# Patient Record
Sex: MALE | Race: WHITE | Employment: OTHER | URBAN - METROPOLITAN AREA
[De-identification: names, ages, dates, MRNs, and addresses within clinical notes are randomized per-mention and may not be internally consistent; named-entity substitution may affect disease eponyms.]

---

## 2017-10-31 ENCOUNTER — APPOINTMENT (OUTPATIENT)
Dept: GENERAL RADIOLOGY | Age: 82
DRG: 699 | End: 2017-10-31
Attending: EMERGENCY MEDICINE
Payer: MEDICARE

## 2017-10-31 ENCOUNTER — APPOINTMENT (OUTPATIENT)
Dept: CT IMAGING | Age: 82
DRG: 699 | End: 2017-10-31
Attending: INTERNAL MEDICINE
Payer: MEDICARE

## 2017-10-31 ENCOUNTER — HOSPITAL ENCOUNTER (INPATIENT)
Age: 82
LOS: 8 days | Discharge: NURSING, REHAB OR LONG-TERM CARE | DRG: 699 | End: 2017-11-08
Attending: EMERGENCY MEDICINE | Admitting: INTERNAL MEDICINE
Payer: MEDICARE

## 2017-10-31 DIAGNOSIS — D69.6 THROMBOCYTOPENIA (HCC): ICD-10-CM

## 2017-10-31 DIAGNOSIS — R91.1 PULMONARY NODULE: ICD-10-CM

## 2017-10-31 DIAGNOSIS — J98.59 MEDIASTINAL MASS: ICD-10-CM

## 2017-10-31 DIAGNOSIS — I48.0 PAROXYSMAL ATRIAL FIBRILLATION (HCC): ICD-10-CM

## 2017-10-31 DIAGNOSIS — R31.0 GROSS HEMATURIA: Primary | ICD-10-CM

## 2017-10-31 DIAGNOSIS — E87.1 HYPONATREMIA: ICD-10-CM

## 2017-10-31 DIAGNOSIS — C61 PROSTATE CANCER (HCC): ICD-10-CM

## 2017-10-31 DIAGNOSIS — D69.9 BLEEDING DISORDER (HCC): ICD-10-CM

## 2017-10-31 PROBLEM — J44.1 COPD EXACERBATION (HCC): Status: ACTIVE | Noted: 2017-10-31

## 2017-10-31 LAB
ALBUMIN SERPL-MCNC: 3.6 G/DL (ref 3.5–5)
ALBUMIN/GLOB SERPL: 1.4 {RATIO} (ref 1.1–2.2)
ALP SERPL-CCNC: 70 U/L (ref 45–117)
ALT SERPL-CCNC: 23 U/L (ref 12–78)
ANION GAP SERPL CALC-SCNC: 11 MMOL/L (ref 5–15)
APTT PPP: 29.5 SEC (ref 22.1–32.5)
AST SERPL-CCNC: 37 U/L (ref 15–37)
BASOPHILS # BLD: 0 K/UL (ref 0–0.1)
BASOPHILS NFR BLD: 0 % (ref 0–1)
BILIRUB SERPL-MCNC: 1 MG/DL (ref 0.2–1)
BNP SERPL-MCNC: 741 PG/ML (ref 0–450)
BUN SERPL-MCNC: 22 MG/DL (ref 6–20)
BUN/CREAT SERPL: 19 (ref 12–20)
CALCIUM SERPL-MCNC: 9.2 MG/DL (ref 8.5–10.1)
CHLORIDE SERPL-SCNC: 86 MMOL/L (ref 97–108)
CK MB CFR SERPL CALC: 2.7 % (ref 0–2.5)
CK MB SERPL-MCNC: 7.7 NG/ML (ref 5–25)
CK SERPL-CCNC: 285 U/L (ref 39–308)
CO2 SERPL-SCNC: 28 MMOL/L (ref 21–32)
CREAT SERPL-MCNC: 1.15 MG/DL (ref 0.7–1.3)
D DIMER PPP FEU-MCNC: 1.2 MG/L FEU (ref 0–0.65)
DIFFERENTIAL METHOD BLD: ABNORMAL
EOSINOPHIL # BLD: 0 K/UL (ref 0–0.4)
EOSINOPHIL NFR BLD: 0 % (ref 0–7)
ERYTHROCYTE [DISTWIDTH] IN BLOOD BY AUTOMATED COUNT: 13.4 % (ref 11.5–14.5)
ERYTHROCYTE [DISTWIDTH] IN BLOOD BY AUTOMATED COUNT: 13.5 % (ref 11.5–14.5)
FIBRINOGEN PPP-MCNC: 390 MG/DL (ref 200–475)
GLOBULIN SER CALC-MCNC: 2.5 G/DL (ref 2–4)
GLUCOSE SERPL-MCNC: 111 MG/DL (ref 65–100)
HCT VFR BLD AUTO: 28.7 % (ref 36.6–50.3)
HCT VFR BLD AUTO: 30.3 % (ref 36.6–50.3)
HGB BLD-MCNC: 10.5 G/DL (ref 12.1–17)
HGB BLD-MCNC: 9.8 G/DL (ref 12.1–17)
INR PPP: 1 (ref 0.9–1.1)
LACTATE SERPL-SCNC: 1.7 MMOL/L (ref 0.4–2)
LYMPHOCYTES # BLD: 0.8 K/UL (ref 0.8–3.5)
LYMPHOCYTES NFR BLD: 5 % (ref 12–49)
MAGNESIUM SERPL-MCNC: 1.7 MG/DL (ref 1.6–2.4)
MCH RBC QN AUTO: 29.3 PG (ref 26–34)
MCH RBC QN AUTO: 29.8 PG (ref 26–34)
MCHC RBC AUTO-ENTMCNC: 34.1 G/DL (ref 30–36.5)
MCHC RBC AUTO-ENTMCNC: 34.7 G/DL (ref 30–36.5)
MCV RBC AUTO: 84.6 FL (ref 80–99)
MCV RBC AUTO: 87.2 FL (ref 80–99)
MONOCYTES # BLD: 0.6 K/UL (ref 0–1)
MONOCYTES NFR BLD: 4 % (ref 5–13)
NEUTS SEG # BLD: 13.6 K/UL (ref 1.8–8)
NEUTS SEG NFR BLD: 91 % (ref 32–75)
PLATELET # BLD AUTO: 70 K/UL (ref 150–400)
PLATELET # BLD AUTO: 74 K/UL (ref 150–400)
POTASSIUM SERPL-SCNC: 4.6 MMOL/L (ref 3.5–5.1)
PROT SERPL-MCNC: 6.1 G/DL (ref 6.4–8.2)
PROTHROMBIN TIME: 10.5 SEC (ref 9–11.1)
RBC # BLD AUTO: 3.29 M/UL (ref 4.1–5.7)
RBC # BLD AUTO: 3.58 M/UL (ref 4.1–5.7)
RBC MORPH BLD: ABNORMAL
SODIUM SERPL-SCNC: 125 MMOL/L (ref 136–145)
THEOPHYLLINE SERPL-MCNC: 12.2 UG/ML (ref 10–20)
THERAPEUTIC RANGE,PTTT: NORMAL SECS (ref 58–77)
TROPONIN I SERPL-MCNC: <0.04 NG/ML
WBC # BLD AUTO: 13.7 K/UL (ref 4.1–11.1)
WBC # BLD AUTO: 15 K/UL (ref 4.1–11.1)

## 2017-10-31 PROCEDURE — 85384 FIBRINOGEN ACTIVITY: CPT | Performed by: INTERNAL MEDICINE

## 2017-10-31 PROCEDURE — 85730 THROMBOPLASTIN TIME PARTIAL: CPT | Performed by: EMERGENCY MEDICINE

## 2017-10-31 PROCEDURE — 83605 ASSAY OF LACTIC ACID: CPT | Performed by: EMERGENCY MEDICINE

## 2017-10-31 PROCEDURE — 80198 ASSAY OF THEOPHYLLINE: CPT | Performed by: INTERNAL MEDICINE

## 2017-10-31 PROCEDURE — 85027 COMPLETE CBC AUTOMATED: CPT | Performed by: INTERNAL MEDICINE

## 2017-10-31 PROCEDURE — 84484 ASSAY OF TROPONIN QUANT: CPT | Performed by: EMERGENCY MEDICINE

## 2017-10-31 PROCEDURE — 85025 COMPLETE CBC W/AUTO DIFF WBC: CPT | Performed by: EMERGENCY MEDICINE

## 2017-10-31 PROCEDURE — 93005 ELECTROCARDIOGRAM TRACING: CPT

## 2017-10-31 PROCEDURE — 65660000000 HC RM CCU STEPDOWN

## 2017-10-31 PROCEDURE — 77030010547 HC BG URIN W/UMETER -A

## 2017-10-31 PROCEDURE — 83735 ASSAY OF MAGNESIUM: CPT | Performed by: EMERGENCY MEDICINE

## 2017-10-31 PROCEDURE — 36415 COLL VENOUS BLD VENIPUNCTURE: CPT | Performed by: EMERGENCY MEDICINE

## 2017-10-31 PROCEDURE — 80198 ASSAY OF THEOPHYLLINE: CPT | Performed by: EMERGENCY MEDICINE

## 2017-10-31 PROCEDURE — 74011250637 HC RX REV CODE- 250/637: Performed by: INTERNAL MEDICINE

## 2017-10-31 PROCEDURE — 77030032490 HC SLV COMPR SCD KNE COVD -B

## 2017-10-31 PROCEDURE — 83880 ASSAY OF NATRIURETIC PEPTIDE: CPT | Performed by: EMERGENCY MEDICINE

## 2017-10-31 PROCEDURE — 80053 COMPREHEN METABOLIC PANEL: CPT | Performed by: EMERGENCY MEDICINE

## 2017-10-31 PROCEDURE — 85362 FIBRIN DEGRADATION PRODUCTS: CPT | Performed by: EMERGENCY MEDICINE

## 2017-10-31 PROCEDURE — 96360 HYDRATION IV INFUSION INIT: CPT

## 2017-10-31 PROCEDURE — 82550 ASSAY OF CK (CPK): CPT | Performed by: EMERGENCY MEDICINE

## 2017-10-31 PROCEDURE — 74011250636 HC RX REV CODE- 250/636: Performed by: INTERNAL MEDICINE

## 2017-10-31 PROCEDURE — 94640 AIRWAY INHALATION TREATMENT: CPT

## 2017-10-31 PROCEDURE — 85379 FIBRIN DEGRADATION QUANT: CPT | Performed by: EMERGENCY MEDICINE

## 2017-10-31 PROCEDURE — 84153 ASSAY OF PSA TOTAL: CPT | Performed by: EMERGENCY MEDICINE

## 2017-10-31 PROCEDURE — 51702 INSERT TEMP BLADDER CATH: CPT

## 2017-10-31 PROCEDURE — 77010033678 HC OXYGEN DAILY

## 2017-10-31 PROCEDURE — 94761 N-INVAS EAR/PLS OXIMETRY MLT: CPT

## 2017-10-31 PROCEDURE — 85610 PROTHROMBIN TIME: CPT | Performed by: EMERGENCY MEDICINE

## 2017-10-31 PROCEDURE — 99284 EMERGENCY DEPT VISIT MOD MDM: CPT

## 2017-10-31 PROCEDURE — 77030018836 HC SOL IRR NACL ICUM -A

## 2017-10-31 PROCEDURE — 74011000258 HC RX REV CODE- 258: Performed by: INTERNAL MEDICINE

## 2017-10-31 PROCEDURE — 71010 XR CHEST PORT: CPT

## 2017-10-31 PROCEDURE — 71260 CT THORAX DX C+: CPT

## 2017-10-31 PROCEDURE — 74011000250 HC RX REV CODE- 250: Performed by: INTERNAL MEDICINE

## 2017-10-31 PROCEDURE — 74011250636 HC RX REV CODE- 250/636: Performed by: EMERGENCY MEDICINE

## 2017-10-31 RX ORDER — IPRATROPIUM BROMIDE AND ALBUTEROL SULFATE 2.5; .5 MG/3ML; MG/3ML
3 SOLUTION RESPIRATORY (INHALATION)
COMMUNITY

## 2017-10-31 RX ORDER — SODIUM CHLORIDE 0.9 % (FLUSH) 0.9 %
5-10 SYRINGE (ML) INJECTION EVERY 8 HOURS
Status: DISCONTINUED | OUTPATIENT
Start: 2017-10-31 | End: 2017-11-08 | Stop reason: HOSPADM

## 2017-10-31 RX ORDER — CLONAZEPAM 0.5 MG/1
0.5 TABLET ORAL
COMMUNITY
End: 2017-10-31

## 2017-10-31 RX ORDER — METOPROLOL SUCCINATE 25 MG/1
25 TABLET, EXTENDED RELEASE ORAL 2 TIMES DAILY
COMMUNITY
End: 2017-11-08

## 2017-10-31 RX ORDER — METHOTREXATE 2.5 MG/1
15 TABLET ORAL
COMMUNITY
End: 2017-10-31

## 2017-10-31 RX ORDER — IPRATROPIUM BROMIDE AND ALBUTEROL SULFATE 2.5; .5 MG/3ML; MG/3ML
3 SOLUTION RESPIRATORY (INHALATION)
Status: DISCONTINUED | OUTPATIENT
Start: 2017-10-31 | End: 2017-11-01

## 2017-10-31 RX ORDER — AMLODIPINE BESYLATE 5 MG/1
5 TABLET ORAL DAILY
COMMUNITY
End: 2017-10-31

## 2017-10-31 RX ORDER — BUDESONIDE AND FORMOTEROL FUMARATE DIHYDRATE 160; 4.5 UG/1; UG/1
2 AEROSOL RESPIRATORY (INHALATION) 2 TIMES DAILY
COMMUNITY

## 2017-10-31 RX ORDER — CELECOXIB 200 MG/1
200 CAPSULE ORAL DAILY
COMMUNITY

## 2017-10-31 RX ORDER — ONDANSETRON 2 MG/ML
4 INJECTION INTRAMUSCULAR; INTRAVENOUS
Status: DISCONTINUED | OUTPATIENT
Start: 2017-10-31 | End: 2017-11-08 | Stop reason: HOSPADM

## 2017-10-31 RX ORDER — SODIUM CHLORIDE 9 MG/ML
75 INJECTION, SOLUTION INTRAVENOUS CONTINUOUS
Status: DISCONTINUED | OUTPATIENT
Start: 2017-10-31 | End: 2017-11-02

## 2017-10-31 RX ORDER — SULFAMETHOXAZOLE AND TRIMETHOPRIM 800; 160 MG/1; MG/1
1 TABLET ORAL
COMMUNITY
End: 2017-11-08

## 2017-10-31 RX ORDER — NAPROXEN SODIUM 220 MG
220 TABLET ORAL EVERY 6 HOURS
COMMUNITY
End: 2017-11-08

## 2017-10-31 RX ORDER — BUSPIRONE HYDROCHLORIDE 5 MG/1
5 TABLET ORAL 2 TIMES DAILY
Status: DISCONTINUED | OUTPATIENT
Start: 2017-10-31 | End: 2017-11-08 | Stop reason: HOSPADM

## 2017-10-31 RX ORDER — PANTOPRAZOLE SODIUM 40 MG/1
40 TABLET, DELAYED RELEASE ORAL
COMMUNITY

## 2017-10-31 RX ORDER — ALBUTEROL SULFATE 0.83 MG/ML
2.5 SOLUTION RESPIRATORY (INHALATION)
Status: DISPENSED | OUTPATIENT
Start: 2017-10-31 | End: 2017-10-31

## 2017-10-31 RX ORDER — FUROSEMIDE 20 MG/1
20 TABLET ORAL DAILY
COMMUNITY
End: 2017-11-08

## 2017-10-31 RX ORDER — FOLIC ACID 1 MG/1
1 TABLET ORAL
Status: DISCONTINUED | OUTPATIENT
Start: 2017-10-31 | End: 2017-11-08 | Stop reason: HOSPADM

## 2017-10-31 RX ORDER — FOLIC ACID 1 MG/1
1 TABLET ORAL
COMMUNITY

## 2017-10-31 RX ORDER — ALBUTEROL SULFATE 0.83 MG/ML
2.5 SOLUTION RESPIRATORY (INHALATION)
Status: DISCONTINUED | OUTPATIENT
Start: 2017-10-31 | End: 2017-11-04

## 2017-10-31 RX ORDER — MONTELUKAST SODIUM 10 MG/1
10 TABLET ORAL
COMMUNITY

## 2017-10-31 RX ORDER — OMEPRAZOLE 40 MG/1
40 CAPSULE, DELAYED RELEASE ORAL
COMMUNITY
End: 2017-10-31

## 2017-10-31 RX ORDER — ACETAMINOPHEN 325 MG/1
650 TABLET ORAL
Status: DISCONTINUED | OUTPATIENT
Start: 2017-10-31 | End: 2017-11-08 | Stop reason: HOSPADM

## 2017-10-31 RX ORDER — ALLOPURINOL 300 MG/1
300 TABLET ORAL DAILY
COMMUNITY
End: 2017-10-31

## 2017-10-31 RX ORDER — MONTELUKAST SODIUM 10 MG/1
10 TABLET ORAL
Status: DISCONTINUED | OUTPATIENT
Start: 2017-10-31 | End: 2017-11-08 | Stop reason: HOSPADM

## 2017-10-31 RX ORDER — CIPROFLOXACIN 500 MG/1
500 TABLET ORAL 2 TIMES DAILY
COMMUNITY
Start: 2017-10-30 | End: 2017-11-08

## 2017-10-31 RX ORDER — METOPROLOL SUCCINATE 25 MG/1
25 TABLET, EXTENDED RELEASE ORAL 2 TIMES DAILY
Status: DISCONTINUED | OUTPATIENT
Start: 2017-10-31 | End: 2017-11-02

## 2017-10-31 RX ORDER — PREDNISONE 10 MG/1
10 TABLET ORAL
COMMUNITY

## 2017-10-31 RX ORDER — SODIUM CHLORIDE 0.9 % (FLUSH) 0.9 %
5-10 SYRINGE (ML) INJECTION AS NEEDED
Status: DISCONTINUED | OUTPATIENT
Start: 2017-10-31 | End: 2017-11-08 | Stop reason: HOSPADM

## 2017-10-31 RX ORDER — PANTOPRAZOLE SODIUM 40 MG/1
40 TABLET, DELAYED RELEASE ORAL
Status: DISCONTINUED | OUTPATIENT
Start: 2017-11-01 | End: 2017-11-08 | Stop reason: HOSPADM

## 2017-10-31 RX ORDER — METHOTREXATE 2.5 MG/1
15 TABLET ORAL
COMMUNITY

## 2017-10-31 RX ADMIN — MONTELUKAST SODIUM 10 MG: 10 TABLET, FILM COATED ORAL at 20:49

## 2017-10-31 RX ADMIN — SODIUM CHLORIDE 1000 ML: 900 INJECTION, SOLUTION INTRAVENOUS at 15:46

## 2017-10-31 RX ADMIN — METOPROLOL SUCCINATE 25 MG: 25 TABLET, FILM COATED, EXTENDED RELEASE ORAL at 20:49

## 2017-10-31 RX ADMIN — FOLIC ACID 1 MG: 1 TABLET ORAL at 20:50

## 2017-10-31 RX ADMIN — CEFTRIAXONE SODIUM 1 G: 1 INJECTION, POWDER, FOR SOLUTION INTRAMUSCULAR; INTRAVENOUS at 20:44

## 2017-10-31 RX ADMIN — SODIUM CHLORIDE 75 ML/HR: 900 INJECTION, SOLUTION INTRAVENOUS at 20:44

## 2017-10-31 RX ADMIN — METHYLPREDNISOLONE SODIUM SUCCINATE 125 MG: 125 INJECTION, POWDER, FOR SOLUTION INTRAMUSCULAR; INTRAVENOUS at 19:11

## 2017-10-31 RX ADMIN — ALBUTEROL SULFATE 2.5 MG: 2.5 SOLUTION RESPIRATORY (INHALATION) at 19:10

## 2017-10-31 RX ADMIN — IPRATROPIUM BROMIDE AND ALBUTEROL SULFATE 3 ML: .5; 3 SOLUTION RESPIRATORY (INHALATION) at 21:19

## 2017-10-31 NOTE — ED PROVIDER NOTES
HPI Comments: 80 y.o. male with past medical history significant for COPD and prostate cancer who presents from home with chief complaint of hematuria. Patient reportedly started to have blood in his jha catheter on Saturday (3 days ago). His home health nurse cleaned out his catheter on Sunday (2 days ago) and patient's wife reportedly started a new catheter last night. However, patient continues to have hematuria. Per patient's wife, he has had a jha catheter for 1 year due to his inability to void. He also complains of bruising to his bilateral arms and legs which started 3 days ago as well. Patient states that he has also had increased shortness of breath the past couple of days. Patient was started on Cipro yesterday by his PCP. His wife states that his last admission was 1 year ago after a fall. Patient has been bed-ridden since this admission. Patient's wife states that he was diagnosed with a \"mass in his chest\" 4 years ago. He denies having any appetite change, fever, increased weakness, abdominal pain, chest pain, headache, fever, nausea, vomiting, diarrhea, or blood in his stool. He denies any recent falls. There are no other acute medical concerns at this time. Social hx: former smoker, no EtOH use, no drug use  PCP: Regis Clarke MD  Pulmonologist: Hill Garibay MD    Note written by Yola Burton, as dictated by Marly Reddy MD 3:28 PM      The history is provided by the patient. No  was used. No past medical history on file. No past surgical history on file. No family history on file. Social History     Social History    Marital status:      Spouse name: N/A    Number of children: N/A    Years of education: N/A     Occupational History    Not on file.      Social History Main Topics    Smoking status: Not on file    Smokeless tobacco: Not on file    Alcohol use Not on file    Drug use: Not on file    Sexual activity: Not on file     Other Topics Concern    Not on file     Social History Narrative         ALLERGIES: Review of patient's allergies indicates no known allergies. Review of Systems   Constitutional: Negative for appetite change and fever. Eyes: Negative for visual disturbance. Respiratory: Positive for shortness of breath. Negative for cough and wheezing. Cardiovascular: Negative for chest pain and leg swelling. Gastrointestinal: Negative for abdominal pain, blood in stool, diarrhea, nausea and vomiting. Genitourinary: Positive for hematuria. Negative for dysuria. Musculoskeletal: Negative. Negative for back pain and neck stiffness. Skin: Positive for color change. Negative for rash. Neurological: Negative. Negative for syncope, weakness and headaches. Psychiatric/Behavioral: Negative for confusion. All other systems reviewed and are negative. Vitals:    10/31/17 1502   BP: 126/73   Pulse: 96   Resp: 18   Temp: 97.6 °F (36.4 °C)   SpO2: 98%   Weight: 81.6 kg (180 lb)   Height: 5' 10\" (1.778 m)            Physical Exam   Constitutional: He appears well-developed and well-nourished. No distress. HENT:   Head: Normocephalic. Eyes: Pupils are equal, round, and reactive to light. Neck: Normal range of motion. Cardiovascular: Normal rate and regular rhythm. No murmur heard. Pulmonary/Chest: He is in respiratory distress. He has decreased breath sounds. He has wheezes (expiratory). Abdominal: Soft. There is no tenderness. Genitourinary:   Genitourinary Comments: Indwelling jha   Musculoskeletal: Normal range of motion. He exhibits no edema. Neurological: He is alert. He has normal strength. No cranial nerve deficit. Skin: Skin is warm and dry. Bruising noted. Bruising to arms and legs, as well as small amount to upper chest wall. None on abdomen or back. Psychiatric: He has a normal mood and affect. His behavior is normal.   Nursing note and vitals reviewed.   Note written by Dwayne Rahmanibe, as dictated by Merary Marlow MD 3:28 PM    [facility]  ED Course       Procedures  CONSULT NOTE:  5:17 PM Merary Marlow MD spoke with Dr. Diana Villegas, Consult for Urology. Discussed available diagnostic tests and clinical findings. He is in agreement with care plans as outlined. Dr. Diana Villegas recommends changing catheter to 22 3-way and admit to hospitalist service.      Spoke c dr. Sharyle Cal - he will adm

## 2017-10-31 NOTE — H&P
Admission History and Physical      NAME:  Elfego Melton   :   1935   MRN:  [id number]     PCP:  Joaquin Hood MD     Date/Time:  10/31/2017           Assessment/Plan:       Yasir Bhandari hematuria (10/31/2017): Unclear etiology. However, with leukocytosis, would be suspicious for UTI. Has been on Cipro. --  consult, continuous irrigation started in ED   -- empiric ceftriaxone IV    -- await UA and culture    Ecchymosis / Thrombocytopenia ([facility] (10/31/2017): No prior labs for comparison. Low platelets could be from MTX. However, does not seem low enough to explain ecchymosis. PT/PTT wnl.   -- check fibrinogen   -- consult hematology   -- check peripheral smear   -- serial labs   -- hold methotrexate   -- await FDP   -- hold NSAIDS    COPD exacerbation ([facility] (10/31/2017): Has wheezing and feels a bit more sob than usual.   -- continue oxygen at 2.5 L   -- steroids IV   -- scheduled and prn nebs   -- continue cam, but check level    Hyponatremia:  Unknown baseline. No volume overloaded. Clinically may be volume depleted with poor skin turgor. Possible adrenal insuff on chronic prednisone. -- IVF with NS   -- check BMP in AM   -- check TSH, urine osm, serum osm, urine na   -- already given IV solumedrol in ED    Prostate cancer [facility]) ():  Was deemed not to be a surgical candidate. Receives Lupron depending on PSA level. --  evaluation    Mediastinal mass () / Pulmonary nodule ():  Was followed by pulmonology with stable imaging. Has not followed up in a couple of years. -- obtain records from pulm associates   -- check ct chest    HTN (hypertension) ():  BP currently controlled. -- continue low dose Toprol    RA:   -- hold MTX / Bactrim     DNR confirmed with patient and wife. Subjective:     CHIEF COMPLAINT:  bleeding    HISTORY OF PRESENT ILLNESS:     Mr. Stephani Perales is a 80 y.o.  male who is admitted with Gross hematuria.   Mr. Stephani Perales presented to the Emergency Department today complaining of gross blood from chronic indwelling jha 3 days ago. No trauma or injury reported. Was seen in [address] [facility] ED and sent home yesterday. No fever, chills. Also noted bruising to arms and legs about the same time. Denies changes to medications. No bleeding from gums. No blood in stool in BM this AM.  No prior hx of bleeding. Past Medical History:   Diagnosis Date    COPD (chronic obstructive pulmonary disease) ([facility]     on home oxygen at 2.5L    HTN (hypertension)     Mediastinal mass     Prostate cancer ([facility]     Pulmonary nodule         No past surgical history on file. Social History   Substance Use Topics    Smoking status: Former Smoker    Smokeless tobacco: Not on file    Alcohol use No        Family History   Problem Relation Age of Onset    Heart Disease Father         No Known Allergies     Prior to Admission medications    Medication Sig Start Date End Date Taking? Authorizing Provider   omeprazole (PRILOSEC) 40 mg capsule Take 40 mg by mouth Daily (before breakfast). Yes Historical Provider   amLODIPine (NORVASC) 5 mg tablet Take 5 mg by mouth daily. Yes Historical Provider   allopurinol (ZYLOPRIM) 300 mg tablet Take 300 mg by mouth daily. Yes Historical Provider   clonazePAM (KLONOPIN) 0.5 mg tablet Take 0.5 mg by mouth two (2) times daily as needed (anxiety). Yes Historical Provider   methotrexate (RHEUMATREX) 2.5 mg tablet Take 15 mg by mouth every Wednesday. Yes Historical Provider         Review of Systems:  (bold if positive, if negative)    Gen:  fatigueEyes:  ENT:  CVS:  Pulm:  GI:    :   Hematuria  MS:  Skin:  Endo:    Hem:  bruisingbleedingRenal:    Neuro:            Objective:      VITALS:    Vital signs reviewed; most recent are:    Visit Vitals    /73 (BP 1 Location: Left arm, BP Patient Position: At rest)    Pulse 96    Temp 97.6 °F (36.4 °C)    Resp 18    Ht 5' 10\" (1.778 m)    Wt 81.6 kg (180 lb)    SpO2 98%    BMI 25.83 kg/m2     SpO2 Readings from Last 6 Encounters:   10/31/17 98%    O2 Flow Rate (L/min): 2.5 l/min   No intake or output data in the 24 hours ending 10/31/17 1821         Exam:     Physical Exam:    Gen:  Well-developed, well-nourished, in no acute distress  HEENT:  Pink conjunctivae, PERRL, hearing intact to voice, moist mucous membranes  Neck:  Supple  Resp:  No accessory muscle use, exp wheezing, no rales, occ rhonchi  Card:  Distant heart sounds, No murmurs, normal S1, S2 without thrills or peripheral edema  Abd:  Soft, non-tender, non-distended, normoactive bowel sounds are present  Lymph:  No cervical adenopathy  Musc:  No cyanosis  Skin:  Ecchymosis of UE > LE, skin turgor is poor  Neuro:  Cranial nerves 3-12 are grossly intact,  strength is 5/5 bilaterally, dorsi / plantarflexion strength is 4/5 bilaterally, follows commands appropriately  Psych:  Alert with good insight. Oriented to person, place, and time       Labs:    Recent Labs      10/31/17   1536   WBC  15.0*   HGB  10.5*   HCT  30.3*   PLT  70*     Recent Labs      10/31/17   1536   NA  125*   K  4.6   CL  86*   CO2  28   GLU  111*   BUN  22*   CREA  1.15   CA  9.2   MG  1.7   ALB  3.6   TBILI  1.0   SGOT  37   ALT  23     No results found for: GLUCPOC  No results for input(s): PH, PCO2, PO2, HCO3, FIO2 in the last 72 hours. Recent Labs      10/31/17   1545   INR  1.0       Chest Xray:  Emphysema. Hiatal hernia. Medical records reviewed in preparation for this admission: Old medical records.     Surrogate decision maker:  spouse    Total time spent with patient: [phone] discussed with: Patient and Family    Discussed:  Care Plan    Prophylaxis:  SCD's    Probable Disposition:   PT, OT, RN           ___________________________________________________    Attending Physician: Maury Abrams MD

## 2017-10-31 NOTE — H&P
Admission History and Physical      NAME:  Femi Hercules   :   1935   MRN:  [id number]     PCP:  Lacey Freedman MD     Date/Time:  10/31/2017           Assessment/Plan:       Baron Arriaga hematuria (10/31/2017): Unclear etiology. However, with leukocytosis, would be suspicious for UTI. Has been on Cipro. --  consult, continuous irrigation started in ED   -- empiric ceftriaxone IV    -- await UA and culture  **8:27 PM  Patient with only clot being pulled out per nurse. Called and spoke with Dr. Bailee Siddiqi and recommends to continue to pull clot with hand irrigation until all clots are out and then restart continuous irrigation. Discussed with RN at bedside. Ecchymosis / Thrombocytopenia ([facility] (10/31/2017): No prior labs for comparison. Low platelets could be from MTX. However, does not seem low enough to explain ecchymosis. PT/PTT wnl. May be contributed by NSAID use. Spoke with Dr. Monica Otoole by phone. -- check fibrinogen   -- consult hematology   -- check peripheral smear   -- serial labs   -- hold methotrexate   -- await FDP   -- hold NSAIDS    COPD exacerbation ([facility] (10/31/2017): Has wheezing and feels a bit more sob than usual.   -- continue oxygen at 2.5 L   -- steroids IV   -- scheduled and prn nebs   -- continue cam, but check level    Hyponatremia:  Unknown baseline. No volume overloaded. Clinically may be volume depleted with poor skin turgor. Possible adrenal insuff on chronic prednisone. -- IVF with NS   -- check BMP in AM   -- check TSH, urine osm, serum osm, urine na   -- already given IV solumedrol in ED    Prostate cancer [facility]) ():  Was deemed not to be a surgical candidate. Receives Lupron depending on PSA level. --  evaluation    Mediastinal mass () / Pulmonary nodule ():  Was followed by pulmonology with stable imaging. Has not followed up in a couple of years.    -- obtain records from pulm associates   -- check ct chest  **8:46 PM  Ct with stable mediastinal mass and pulm nodule. Pancreatic tail lesion of 15mm lesion will need follow up once current active conditions are stable. HTN (hypertension) ():  BP currently controlled. -- continue low dose Toprol    RA:   -- hold MTX / Bactrim     DNR confirmed with patient and wife. Subjective:     CHIEF COMPLAINT:  bleeding    HISTORY OF PRESENT ILLNESS:     Mr. Landry Terry is a 80 y.o.  male who is admitted with Gross hematuria. Mr. Landry Terry presented to the Emergency Department today complaining of gross blood from chronic indwelling jha 3 days ago. No trauma or injury reported. Was seen in [facility] ED and sent home yesterday. No fever, chills. Also noted bruising to arms and legs about the same time. Denies changes to medications. No bleeding from gums. No blood in stool in BM this AM.  No prior hx of bleeding. Past Medical History:   Diagnosis Date    COPD (chronic obstructive pulmonary disease) ([address]     on home oxygen at 2.5L    HTN (hypertension)     Mediastinal mass     Prostate cancer (HCC)     Pulmonary nodule     Rheumatoid arthritis (HCC)     followed by Dr. Mary Jane Barros        No past surgical history on file. Social History   Substance Use Topics    Smoking status: Former Smoker    Smokeless tobacco: Not on file    Alcohol use No        Family History   Problem Relation Age of Onset    Heart Disease Father         No Known Allergies     Prior to Admission medications    Medication Sig Start Date End Date Taking? Authorizing Provider   albuterol-ipratropium (DUO-NEB) 2.5 mg-0.5 mg/3 ml nebu 3 mL by Nebulization route every four (4) hours as needed. Yes Historical Provider   budesonide-formoterol (SYMBICORT) 160-4.5 mcg/actuation HFAA Take 2 Puffs by inhalation two (2) times a day. Yes Historical Provider   BUSPIRONE HCL (BUSPAR PO) Take 5 mg by mouth two (2) times a day.    Yes Historical Provider   celecoxib (CELEBREX) 200 mg capsule Take 200 mg by mouth daily. Yes Historical Provider   ciprofloxacin HCl (CIPRO) 500 mg tablet Take 500 mg by mouth two (2) times a day. 10/30/17 11/5/17 Yes Historical Provider   folic acid (FOLVITE) 1 mg tablet Take 1 mg by mouth daily (with dinner). Yes Historical Provider   furosemide (LASIX) 20 mg tablet Take 20 mg by mouth daily. Yes Historical Provider   metoprolol succinate (TOPROL XL) 25 mg XL tablet Take 25 mg by mouth two (2) times a day. Yes Historical Provider   montelukast (SINGULAIR) 10 mg tablet Take 10 mg by mouth daily (with dinner). Yes Historical Provider   pantoprazole (PROTONIX) 40 mg tablet Take 40 mg by mouth Daily (before breakfast). Yes Historical Provider   trimethoprim-sulfamethoxazole (BACTRIM DS) 160-800 mg per tablet Take 1 Tab by mouth every Monday, Wednesday, Friday. Yes Historical Provider   theophylline ER (FRANCISCO-24) 400 mg cp24 capsule Take 200 mg by mouth two (2) times a day. Yes Historical Provider   predniSONE (DELTASONE) 10 mg tablet Take 10 mg by mouth daily (with breakfast). Yes Historical Provider   naproxen sodium (ALEVE) 220 mg tablet Take 220 mg by mouth every six (6) hours. Yes Historical Provider   methotrexate (RHEUMATREX) 2.5 mg tablet Take 15 mg by mouth every Wednesday. Yes Historical Provider         Review of Systems:  (bold if positive, if negative)    Gen:  fatigueEyes:  ENT:  CVS:  Pulm:  GI:    :   Hematuria  MS:  Skin:  Endo:    Hem:  bruisingbleedingRenal:    Neuro:            Objective:      VITALS:    Vital signs reviewed; most recent are:    Visit Vitals    /73 (BP 1 Location: Left arm, BP Patient Position: At rest)    Pulse 96    Temp 97.6 °F (36.4 °C)    Resp 18    Ht 5' 10\" (1.778 m)    Wt 81.6 kg (180 lb)    SpO2 98%    BMI 25.83 kg/m2     SpO2 Readings from Last 6 Encounters:   10/31/17 98%    O2 Flow Rate (L/min): 2.5 l/min   No intake or output data in the 24 hours ending 10/31/17 1931         Exam:     Physical Exam:    Gen: Well-developed, well-nourished, in no acute distress  HEENT:  Pink conjunctivae, PERRL, hearing intact to voice, moist mucous membranes  Neck:  Supple  Resp:  No accessory muscle use, exp wheezing, no rales, occ rhonchi  Card:  Distant heart sounds, No murmurs, normal S1, S2 without thrills or peripheral edema  Abd:  Soft, non-tender, non-distended, normoactive bowel sounds are present  Lymph:  No cervical adenopathy  Musc:  No cyanosis  Skin:  Ecchymosis of UE > LE, skin turgor is poor  Neuro:  Cranial nerves 3-12 are grossly intact,  strength is 5/5 bilaterally, dorsi / plantarflexion strength is 4/5 bilaterally, follows commands appropriately  Psych:  Alert with good insight. Oriented to person, place, and time       Labs:    Recent Labs      10/31/17   1536   WBC  15.0*   HGB  10.5*   HCT  30.3*   PLT  70*     Recent Labs      10/31/17   1536   NA  125*   K  4.6   CL  86*   CO2  28   GLU  111*   BUN  22*   CREA  1.15   CA  9.2   MG  1.7   ALB  3.6   TBILI  1.0   SGOT  37   ALT  23     No results found for: GLUCPOC  No results for input(s): PH, PCO2, PO2, HCO3, FIO2 in the last 72 hours. Recent Labs      10/31/17   1545   INR  1.0       Chest Xray:  Emphysema. Hiatal hernia. EKG:  Sinus arrhythmia, inferior Q waves. Medical records reviewed in preparation for this admission: Old medical records.     Surrogate decision maker:  spouse    Total time spent with patient: [phone] discussed with: Patient and Family    Discussed:  Care Plan    Prophylaxis:  SCD's    Probable Disposition:   PT, OT, RN           ___________________________________________________    Attending Physician: Christophe Costa MD

## 2017-10-31 NOTE — PROGRESS NOTES
[facility]: MED RECONCILIATION    Comments/Recommendations:    RP completed med rec via interview with patient's wife who is a retired nurse. I went through each medication with her on the patient's medication list that she brought in.  Per wife, patient has been receiving Aleve 2 tablet every 4-6 hours for shoulder pain for the past week. RP counseled wife on medication and frequency, especially since patient's arms are completely bruised. Wife states she has been out of celebrex for the past week so they have not been taken together. RP counseled wife on duplicate NSAIDs and suggested taking Tylenol for pain.  Wife states that patient takes prednisone 10 mg po daily chronically for his COPD.  Wife reports that patient has received Lupron injections in the past for his prostate cancer, and she believes his last dose may have been in August. Per wife, patient receives injections every 4 months based on his PSA levels. Medications added:     · Duonebs  · Symbicort  · Buspar  · Celebrex  · Cipro  · FA  · Lasix  · Toprol xl  · Singulair  · Protonix  · Bactrim DS  · Theophylline  · Prednisone  · Aleve      Information obtained from: patient's wife (retired nurse), rx query, patient medication list     Significant PMH/Disease States:   Past Medical History:   Diagnosis Date    COPD (chronic obstructive pulmonary disease) ([facility]     on home oxygen at 2.5L    HTN (hypertension)     Mediastinal mass     Prostate cancer ([facility]     Pulmonary nodule     Rheumatoid arthritis ([facility]     followed by Dr. Mick Diego for this Admission:   Chief Complaint   Patient presents with    Urinary Catheter Problem    Blood in Urine       Allergies: Review of patient's allergies indicates no known allergies. Prior to Admission Medications:   Prior to Admission Medications   Prescriptions Last Dose Informant Patient Reported? Taking?    BUSPIRONE HCL (BUSPAR PO) 10/31/2017 at AM  Yes Yes   Sig: Take 5 mg by mouth two (2) times a day. albuterol-ipratropium (DUO-NEB) 2.5 mg-0.5 mg/3 ml nebu 10/31/2017 at AM  Yes Yes   Sig: 3 mL by Nebulization route every four (4) hours as needed. budesonide-formoterol (SYMBICORT) 160-4.5 mcg/actuation HFAA 10/31/2017 at Unknown time  Yes Yes   Sig: Take 2 Puffs by inhalation two (2) times a day. celecoxib (CELEBREX) 200 mg capsule last week at AM  Yes Yes   Sig: Take 200 mg by mouth daily. ciprofloxacin HCl (CIPRO) 500 mg tablet 10/31/2017 at Unknown time  Yes Yes   Sig: Take 500 mg by mouth two (2) times a day. folic acid (FOLVITE) 1 mg tablet 10/30/2017 at PM  Yes Yes   Sig: Take 1 mg by mouth daily (with dinner). furosemide (LASIX) 20 mg tablet 10/31/2017 at Unknown time  Yes Yes   Sig: Take 20 mg by mouth daily. methotrexate (RHEUMATREX) 2.5 mg tablet 10/25/2017 at AM  Yes Yes   Sig: Take 15 mg by mouth every Wednesday. metoprolol succinate (TOPROL XL) 25 mg XL tablet 10/31/2017 at AM  Yes Yes   Sig: Take 25 mg by mouth two (2) times a day. montelukast (SINGULAIR) 10 mg tablet 10/30/2017 at PM  Yes Yes   Sig: Take 10 mg by mouth daily (with dinner). naproxen sodium (ALEVE) 220 mg tablet   Yes Yes   Sig: Take 220 mg by mouth every six (6) hours. pantoprazole (PROTONIX) 40 mg tablet 10/31/2017 at AM  Yes Yes   Sig: Take 40 mg by mouth Daily (before breakfast). predniSONE (DELTASONE) 10 mg tablet 10/31/2017 at AM  Yes Yes   Sig: Take 10 mg by mouth daily (with breakfast). theophylline ER (FRANCISCO-24) 400 mg cp24 capsule 10/31/2017 at AM  Yes Yes   Sig: Take 200 mg by mouth two (2) times a day. trimethoprim-sulfamethoxazole (BACTRIM DS) 160-800 mg per tablet 10/30/2017 at AM  Yes Yes   Sig: Take 1 Tab by mouth every Monday, Wednesday, Friday.       Facility-Administered Medications: None           LORIE Spear   Contact: [phone]

## 2017-10-31 NOTE — ED TRIAGE NOTES
Pt. Doyle Ore blood in his jha catheter for the past 3 days, with pain noted to site. Pt. Wears Oxygen 2l/min NC for COPD.

## 2017-11-01 ENCOUNTER — APPOINTMENT (OUTPATIENT)
Dept: CT IMAGING | Age: 82
DRG: 699 | End: 2017-11-01
Attending: UROLOGY
Payer: MEDICARE

## 2017-11-01 PROBLEM — I48.0 PAROXYSMAL ATRIAL FIBRILLATION (HCC): Status: ACTIVE | Noted: 2017-11-01

## 2017-11-01 LAB
ANION GAP SERPL CALC-SCNC: 9 MMOL/L (ref 5–15)
ATRIAL RATE: 100 BPM
ATRIAL RATE: 101 BPM
BASOPHILS # BLD: 0 K/UL (ref 0–0.1)
BASOPHILS NFR BLD: 0 % (ref 0–1)
BUN SERPL-MCNC: 22 MG/DL (ref 6–20)
BUN/CREAT SERPL: 22 (ref 12–20)
CALCIUM SERPL-MCNC: 9.2 MG/DL (ref 8.5–10.1)
CALCULATED P AXIS, ECG09: 94 DEGREES
CALCULATED R AXIS, ECG10: 71 DEGREES
CALCULATED R AXIS, ECG10: 85 DEGREES
CALCULATED T AXIS, ECG11: 62 DEGREES
CALCULATED T AXIS, ECG11: 86 DEGREES
CHLORIDE SERPL-SCNC: 91 MMOL/L (ref 97–108)
CO2 SERPL-SCNC: 27 MMOL/L (ref 21–32)
CREAT SERPL-MCNC: 1.02 MG/DL (ref 0.7–1.3)
DIAGNOSIS, 93000: NORMAL
DIAGNOSIS, 93000: NORMAL
DIFFERENTIAL METHOD BLD: ABNORMAL
EOSINOPHIL # BLD: 0 K/UL (ref 0–0.4)
EOSINOPHIL NFR BLD: 0 % (ref 0–7)
ERYTHROCYTE [DISTWIDTH] IN BLOOD BY AUTOMATED COUNT: 13.5 % (ref 11.5–14.5)
ERYTHROCYTE [DISTWIDTH] IN BLOOD BY AUTOMATED COUNT: 13.9 % (ref 11.5–14.5)
FERRITIN SERPL-MCNC: 585 NG/ML (ref 26–388)
FOLATE SERPL-MCNC: 17.5 NG/ML (ref 5–21)
GLUCOSE SERPL-MCNC: 147 MG/DL (ref 65–100)
HAPTOGLOB SERPL-MCNC: 152 MG/DL (ref 30–200)
HCT VFR BLD AUTO: 27.1 % (ref 36.6–50.3)
HCT VFR BLD AUTO: 27.4 % (ref 36.6–50.3)
HGB BLD-MCNC: 9 G/DL (ref 12.1–17)
HGB BLD-MCNC: 9.3 G/DL (ref 12.1–17)
INR PPP: 1.1 (ref 0.9–1.1)
IRON SATN MFR SERPL: 15 % (ref 20–50)
IRON SERPL-MCNC: 33 UG/DL (ref 35–150)
LDH SERPL L TO P-CCNC: 283 U/L (ref 85–241)
LYMPHOCYTES # BLD: 0.2 K/UL (ref 0.8–3.5)
LYMPHOCYTES NFR BLD: 2 % (ref 12–49)
MAGNESIUM SERPL-MCNC: 1.9 MG/DL (ref 1.6–2.4)
MCH RBC QN AUTO: 28.8 PG (ref 26–34)
MCH RBC QN AUTO: 29.2 PG (ref 26–34)
MCHC RBC AUTO-ENTMCNC: 32.8 G/DL (ref 30–36.5)
MCHC RBC AUTO-ENTMCNC: 34.3 G/DL (ref 30–36.5)
MCV RBC AUTO: 85 FL (ref 80–99)
MCV RBC AUTO: 87.5 FL (ref 80–99)
MONOCYTES # BLD: 0.4 K/UL (ref 0–1)
MONOCYTES NFR BLD: 4 % (ref 5–13)
NEUTS SEG # BLD: 9.2 K/UL (ref 1.8–8)
NEUTS SEG NFR BLD: 94 % (ref 32–75)
OSMOLALITY SERPL: 273 MOSM/KG H2O
P-R INTERVAL, ECG05: 160 MS
PERIPHERAL SMEAR,PSM: NORMAL
PLATELET # BLD AUTO: 104 K/UL (ref 150–400)
PLATELET # BLD AUTO: 81 K/UL (ref 150–400)
POTASSIUM SERPL-SCNC: 4.7 MMOL/L (ref 3.5–5.1)
PROTHROMBIN TIME: 11.3 SEC (ref 9–11.1)
PSA SERPL-MCNC: 0.2 NG/ML (ref 0–4)
Q-T INTERVAL, ECG07: 350 MS
Q-T INTERVAL, ECG07: 364 MS
QRS DURATION, ECG06: 72 MS
QRS DURATION, ECG06: 74 MS
QTC CALCULATION (BEZET), ECG08: 451 MS
QTC CALCULATION (BEZET), ECG08: 467 MS
RBC # BLD AUTO: 3.13 M/UL (ref 4.1–5.7)
RBC # BLD AUTO: 3.19 M/UL (ref 4.1–5.7)
RBC MORPH BLD: ABNORMAL
RBC MORPH BLD: ABNORMAL
RETICS/RBC NFR AUTO: 2.2 % (ref 0.7–2.1)
SODIUM SERPL-SCNC: 127 MMOL/L (ref 136–145)
T4 FREE SERPL-MCNC: 1.4 NG/DL (ref 0.8–1.5)
THEOPHYLLINE SERPL-MCNC: 9.5 UG/ML (ref 10–20)
TIBC SERPL-MCNC: 219 UG/DL (ref 250–450)
TROPONIN I SERPL-MCNC: <0.04 NG/ML
TSH SERPL DL<=0.05 MIU/L-ACNC: 0.74 UIU/ML (ref 0.36–3.74)
VENTRICULAR RATE, ECG03: 100 BPM
VENTRICULAR RATE, ECG03: 99 BPM
VIT B12 SERPL-MCNC: 671 PG/ML (ref 211–911)
WBC # BLD AUTO: 8.5 K/UL (ref 4.1–11.1)
WBC # BLD AUTO: 9.8 K/UL (ref 4.1–11.1)
WBC MORPH BLD: ABNORMAL

## 2017-11-01 PROCEDURE — 83010 ASSAY OF HAPTOGLOBIN QUANT: CPT | Performed by: NURSE PRACTITIONER

## 2017-11-01 PROCEDURE — 84439 ASSAY OF FREE THYROXINE: CPT | Performed by: INTERNAL MEDICINE

## 2017-11-01 PROCEDURE — 74011000258 HC RX REV CODE- 258: Performed by: INTERNAL MEDICINE

## 2017-11-01 PROCEDURE — 83540 ASSAY OF IRON: CPT | Performed by: NURSE PRACTITIONER

## 2017-11-01 PROCEDURE — 85027 COMPLETE CBC AUTOMATED: CPT | Performed by: INTERNAL MEDICINE

## 2017-11-01 PROCEDURE — 93041 RHYTHM ECG TRACING: CPT

## 2017-11-01 PROCEDURE — 97161 PT EVAL LOW COMPLEX 20 MIN: CPT

## 2017-11-01 PROCEDURE — 94640 AIRWAY INHALATION TREATMENT: CPT

## 2017-11-01 PROCEDURE — 86803 HEPATITIS C AB TEST: CPT | Performed by: NURSE PRACTITIONER

## 2017-11-01 PROCEDURE — 74011250636 HC RX REV CODE- 250/636: Performed by: INTERNAL MEDICINE

## 2017-11-01 PROCEDURE — 65660000000 HC RM CCU STEPDOWN

## 2017-11-01 PROCEDURE — 93306 TTE W/DOPPLER COMPLETE: CPT

## 2017-11-01 PROCEDURE — 77030027138 HC INCENT SPIROMETER -A

## 2017-11-01 PROCEDURE — 74011000250 HC RX REV CODE- 250: Performed by: INTERNAL MEDICINE

## 2017-11-01 PROCEDURE — 83735 ASSAY OF MAGNESIUM: CPT | Performed by: INTERNAL MEDICINE

## 2017-11-01 PROCEDURE — 97530 THERAPEUTIC ACTIVITIES: CPT

## 2017-11-01 PROCEDURE — 97535 SELF CARE MNGMENT TRAINING: CPT

## 2017-11-01 PROCEDURE — 82607 VITAMIN B-12: CPT | Performed by: NURSE PRACTITIONER

## 2017-11-01 PROCEDURE — 83930 ASSAY OF BLOOD OSMOLALITY: CPT | Performed by: INTERNAL MEDICINE

## 2017-11-01 PROCEDURE — 36415 COLL VENOUS BLD VENIPUNCTURE: CPT | Performed by: INTERNAL MEDICINE

## 2017-11-01 PROCEDURE — 85025 COMPLETE CBC W/AUTO DIFF WBC: CPT | Performed by: NURSE PRACTITIONER

## 2017-11-01 PROCEDURE — 85045 AUTOMATED RETICULOCYTE COUNT: CPT | Performed by: NURSE PRACTITIONER

## 2017-11-01 PROCEDURE — 82728 ASSAY OF FERRITIN: CPT | Performed by: NURSE PRACTITIONER

## 2017-11-01 PROCEDURE — 97116 GAIT TRAINING THERAPY: CPT

## 2017-11-01 PROCEDURE — 74011250637 HC RX REV CODE- 250/637: Performed by: INTERNAL MEDICINE

## 2017-11-01 PROCEDURE — 84443 ASSAY THYROID STIM HORMONE: CPT | Performed by: INTERNAL MEDICINE

## 2017-11-01 PROCEDURE — 77030018836 HC SOL IRR NACL ICUM -A

## 2017-11-01 PROCEDURE — 83615 LACTATE (LD) (LDH) ENZYME: CPT | Performed by: NURSE PRACTITIONER

## 2017-11-01 PROCEDURE — 84155 ASSAY OF PROTEIN SERUM: CPT | Performed by: NURSE PRACTITIONER

## 2017-11-01 PROCEDURE — 74011636320 HC RX REV CODE- 636/320: Performed by: RADIOLOGY

## 2017-11-01 PROCEDURE — 97162 PT EVAL MOD COMPLEX 30 MIN: CPT

## 2017-11-01 PROCEDURE — 85610 PROTHROMBIN TIME: CPT | Performed by: INTERNAL MEDICINE

## 2017-11-01 PROCEDURE — 74011000250 HC RX REV CODE- 250: Performed by: PHYSICIAN ASSISTANT

## 2017-11-01 PROCEDURE — 80048 BASIC METABOLIC PNL TOTAL CA: CPT | Performed by: INTERNAL MEDICINE

## 2017-11-01 PROCEDURE — 82746 ASSAY OF FOLIC ACID SERUM: CPT | Performed by: NURSE PRACTITIONER

## 2017-11-01 PROCEDURE — 74178 CT ABD&PLV WO CNTR FLWD CNTR: CPT

## 2017-11-01 PROCEDURE — 97165 OT EVAL LOW COMPLEX 30 MIN: CPT

## 2017-11-01 PROCEDURE — 77010033678 HC OXYGEN DAILY

## 2017-11-01 PROCEDURE — 84484 ASSAY OF TROPONIN QUANT: CPT | Performed by: NURSE PRACTITIONER

## 2017-11-01 RX ORDER — DILTIAZEM HYDROCHLORIDE 5 MG/ML
10 INJECTION INTRAVENOUS ONCE
Status: ACTIVE | OUTPATIENT
Start: 2017-11-01 | End: 2017-11-01

## 2017-11-01 RX ORDER — ARFORMOTEROL TARTRATE 15 UG/2ML
15 SOLUTION RESPIRATORY (INHALATION)
Status: DISCONTINUED | OUTPATIENT
Start: 2017-11-01 | End: 2017-11-08 | Stop reason: HOSPADM

## 2017-11-01 RX ORDER — IPRATROPIUM BROMIDE AND ALBUTEROL SULFATE 2.5; .5 MG/3ML; MG/3ML
3 SOLUTION RESPIRATORY (INHALATION)
Status: DISCONTINUED | OUTPATIENT
Start: 2017-11-01 | End: 2017-11-04

## 2017-11-01 RX ORDER — BUDESONIDE 0.5 MG/2ML
500 INHALANT ORAL
Status: DISCONTINUED | OUTPATIENT
Start: 2017-11-01 | End: 2017-11-08 | Stop reason: HOSPADM

## 2017-11-01 RX ADMIN — CEFTRIAXONE SODIUM 1 G: 1 INJECTION, POWDER, FOR SOLUTION INTRAMUSCULAR; INTRAVENOUS at 20:21

## 2017-11-01 RX ADMIN — IPRATROPIUM BROMIDE AND ALBUTEROL SULFATE 3 ML: .5; 3 SOLUTION RESPIRATORY (INHALATION) at 14:14

## 2017-11-01 RX ADMIN — BUDESONIDE 500 MCG: 0.5 INHALANT RESPIRATORY (INHALATION) at 09:56

## 2017-11-01 RX ADMIN — IPRATROPIUM BROMIDE AND ALBUTEROL SULFATE 3 ML: .5; 3 SOLUTION RESPIRATORY (INHALATION) at 19:50

## 2017-11-01 RX ADMIN — THEOPHYLLINE ANHYDROUS 200 MG: 200 CAPSULE, EXTENDED RELEASE ORAL at 20:24

## 2017-11-01 RX ADMIN — METHYLPREDNISOLONE SODIUM SUCCINATE 60 MG: 125 INJECTION, POWDER, FOR SOLUTION INTRAMUSCULAR; INTRAVENOUS at 17:48

## 2017-11-01 RX ADMIN — METHYLPREDNISOLONE SODIUM SUCCINATE 60 MG: 40 INJECTION, POWDER, FOR SOLUTION INTRAMUSCULAR; INTRAVENOUS at 00:09

## 2017-11-01 RX ADMIN — METHYLPREDNISOLONE SODIUM SUCCINATE 60 MG: 40 INJECTION, POWDER, FOR SOLUTION INTRAMUSCULAR; INTRAVENOUS at 05:38

## 2017-11-01 RX ADMIN — ARFORMOTEROL TARTRATE 15 MCG: 15 SOLUTION RESPIRATORY (INHALATION) at 09:56

## 2017-11-01 RX ADMIN — METOPROLOL SUCCINATE 25 MG: 25 TABLET, FILM COATED, EXTENDED RELEASE ORAL at 05:38

## 2017-11-01 RX ADMIN — IOPAMIDOL 100 ML: 755 INJECTION, SOLUTION INTRAVENOUS at 19:39

## 2017-11-01 RX ADMIN — Medication 10 ML: at 05:39

## 2017-11-01 RX ADMIN — METHYLPREDNISOLONE SODIUM SUCCINATE 60 MG: 125 INJECTION, POWDER, FOR SOLUTION INTRAMUSCULAR; INTRAVENOUS at 21:16

## 2017-11-01 RX ADMIN — BUSPIRONE HYDROCHLORIDE 5 MG: 5 TABLET ORAL at 00:10

## 2017-11-01 RX ADMIN — BUSPIRONE HYDROCHLORIDE 5 MG: 5 TABLET ORAL at 10:35

## 2017-11-01 RX ADMIN — THEOPHYLLINE ANHYDROUS 200 MG: 200 CAPSULE, EXTENDED RELEASE ORAL at 10:35

## 2017-11-01 RX ADMIN — Medication 10 ML: at 17:48

## 2017-11-01 RX ADMIN — THEOPHYLLINE ANHYDROUS 200 MG: 200 CAPSULE, EXTENDED RELEASE ORAL at 00:10

## 2017-11-01 RX ADMIN — FOLIC ACID 1 MG: 1 TABLET ORAL at 17:48

## 2017-11-01 RX ADMIN — ARFORMOTEROL TARTRATE 15 MCG: 15 SOLUTION RESPIRATORY (INHALATION) at 19:50

## 2017-11-01 RX ADMIN — SODIUM CHLORIDE 75 ML/HR: 900 INJECTION, SOLUTION INTRAVENOUS at 12:54

## 2017-11-01 RX ADMIN — Medication 10 ML: at 00:10

## 2017-11-01 RX ADMIN — MONTELUKAST SODIUM 10 MG: 10 TABLET, FILM COATED ORAL at 17:48

## 2017-11-01 RX ADMIN — DILTIAZEM HYDROCHLORIDE 5 MG/HR: 5 INJECTION INTRAVENOUS at 06:50

## 2017-11-01 RX ADMIN — BUDESONIDE 500 MCG: 0.5 INHALANT RESPIRATORY (INHALATION) at 19:50

## 2017-11-01 RX ADMIN — METOPROLOL SUCCINATE 25 MG: 25 TABLET, FILM COATED, EXTENDED RELEASE ORAL at 20:24

## 2017-11-01 RX ADMIN — BUSPIRONE HYDROCHLORIDE 5 MG: 5 TABLET ORAL at 20:23

## 2017-11-01 RX ADMIN — PANTOPRAZOLE SODIUM 40 MG: 40 TABLET, DELAYED RELEASE ORAL at 06:32

## 2017-11-01 NOTE — ED NOTES
Patient continues with perfuse, clotted bleeding from jha with hand irrigation. Jha unable to drain at this time. Dr. Shraddha Garcia aware. Awaiting urology.

## 2017-11-01 NOTE — CONSULTS
Alex Hansen MD [facility] [address]  Office [phone]  Mobile [phone]    Date of  Admission: 10/31/2017  3:05 PM       Assessment/Plan:   1. New onset A Fib with RVR- prob triggered by underlying pulm issues    - cont cardizem gtt and po BB   - Will check Troponin, echo   -   Would switch patient's QUINN to levoalbuterol   -  Keep Mag++ 2.0 or higher, Keep K+ 4.0 or higher   -  Not a candidate for [facility] given patient's hematuria, thrombocytopenia, hx of falls. 2. WCT- probably aflutter/ afib with aberrancy and rate related BBB    - ideally needs ischemia eval w/ stress test, but favor conservative approach given multiple comorbid conditions and inability to give antiplt tx     3. End stage COPD on chronic O2, steroids    -  Management per primary team   - palliative care     4. HYPOnatremia   -  Management per primary team.    5. Prostate Ca/Mediastinal mass - per primary team/pulm     6. Anemia/thrombocytopenia- per attending   7. Hematuria- urology following   8. DNR       -     Addendum 11:50 am: I s/w  Dr Zurdo Stockton,  pt's son in law, and wife, updated on data and CV dx. All agree with conservative management     Pt personally seen and examined. Chart reviewed. Agree with advanced NP's history, exam and  A/P with changes/additons. Elderly/frail/confused  Neck-JVD+  CVS-S1-S2 present,    2/6 systolic murmur present  RS-   Dec AE bilat  Abdomen-  soft/NT  LE-   no edema    Discussed with patient/nursingd    Carmen Brush MD, [facility]      Thank you for allowing us to participate in Leigh Sweeney care. We will be happy to follow along. Please call for any questions.      Consult requested by Margoth Foy MD for   Subjective:  Leigh Sweeney is a 80 y.o. male with PMH of HTN , COPD (2.5L NC/steroids/theophylline) cx by PAF (during acute illness a few yrs ago,  no anticoagulation), Prostate Ca on Lupron , RA,  Anemia,  who presented on 10/31 for evaluation of hematuria x 3 days. The patient has a chronic indwelling jha for urinary retention. He denies any traumatic insertion but endorses switching catheter out prior to arrival to hospital.  The patient was placed on a continuous bladder irrigation and is being followed by urology. On admission patient was also noted to be HYPOnatermic    Overnight, the patient was noted to have intermittent A flutter with RVR. Chronic class IV dyspnea He denies any CP, edema, dizziness. Around 0500 patient was started on diltiazem gtt at 5 mg/hr but episodes of rapid a flutter persisted. BP as low as 96/67 during episodes, limiting up-titration of diltiazem. Per patient, he has had no \"heart problems\" in the past.  Denies CAD, afib, Endorses SOB prior to admission but feels he is at baseline. He has never had an ischemic workup in the past.     Cardiac risk factors: HTN DM  Smoking  Family hx JASON,  Dyslipidemia -Male gender   Labs: trop (-) x 1 proBNP 741, K 4.7, , Mg 1.9, Hgb 9.3, plt 81    Cardiac Testing/ Procedures:   No specialty comments available. SOCIAL HX:  Patient is a retired . He lives with his wife who is a retired nurse. At baseline patient non-ambulatory/bed bound since fall,, reports not leaving house over past several months. Remote tobacco   FAMILY HX:   Father:  May have had MI in his late 63's in past per patient  Mother:  No cardiac history per patient.   Two brothers, three sisters:  No cardiac history to patient's knowledge   Patient Active Problem List    Diagnosis Date Noted   Williams Frieze hematuria 10/31/2017    COPD exacerbation ([facility] 10/31/2017    Thrombocytopenia ([facility] 10/31/2017    Prostate cancer ([facility]     Mediastinal mass     Pulmonary nodule     HTN (hypertension)       Past Medical History:   Diagnosis Date    COPD (chronic obstructive pulmonary disease) ([facility]     on home oxygen at 2.5L    HTN (hypertension)     Mediastinal mass  Prostate cancer ([facility] [address] [facility]     Pulmonary nodule     Rheumatoid arthritis ([facility] [address]     followed by Dr. Cornelio Thomason      No past surgical history on file. No Known Allergies   Current Facility-Administered Medications   Medication Dose Route Frequency    dilTIAZem (CARDIZEM) injection 10 mg  10 mg IntraVENous ONCE    dilTIAZem (CARDIZEM) 125 mg in dextrose 5% 125 mL infusion  0-15 mg/hr IntraVENous CONTINUOUS    sodium chloride (NS) flush 5-10 mL  5-10 mL IntraVENous Q8H    sodium chloride (NS) flush 5-10 mL  5-10 mL IntraVENous PRN    0.9% sodium chloride infusion  75 mL/hr IntraVENous CONTINUOUS    acetaminophen (TYLENOL) tablet 650 mg  650 mg Oral Q4H PRN    ondansetron (ZOFRAN) injection 4 mg  4 mg IntraVENous Q4H PRN    methylPREDNISolone (PF) (SOLU-MEDROL) injection 60 mg  60 mg IntraVENous Q6H    albuterol-ipratropium (DUO-NEB) 2.5 MG-0.5 MG/3 ML  3 mL Nebulization Q4HWA RT    albuterol (PROVENTIL VENTOLIN) nebulizer solution 2.5 mg  2.5 mg Nebulization Q2H PRN    cefTRIAXone (ROCEPHIN) 1 g in 0.9% sodium chloride (MBP/ADV) 50 mL  1 g IntraVENous Q24H    busPIRone (BUSPAR) tablet 5 mg  5 mg Oral BID    folic acid (FOLVITE) tablet 1 mg  1 mg Oral DAILY WITH DINNER    metoprolol succinate (TOPROL-XL) XL tablet 25 mg  25 mg Oral BID    montelukast (SINGULAIR) tablet 10 mg  10 mg Oral DAILY WITH DINNER    pantoprazole (PROTONIX) tablet 40 mg  40 mg Oral ACB    theophylline ER (FRANCISCO-24) capsule 200 mg  200 mg Oral BID          Review of Symptoms:  Constitutional: negative except for fatigue  ENT: negative   Respiratory: negative except for cough, dyspnea on exertion, emphysema or chronic bronchitis  Gastrointestinal: negative  Genitourinary: + hematuria denies dysuria,   frequency   Musculoskeletal:negative except for muscle weakness  Neurological: negative except for speech problems- stuttering speech  Other systems reviewed and negative except as above.   Social History     Social History    Marital status:      Spouse name: N/A    Number of children: N/A    Years of education: N/A     Social History Main Topics    Smoking status: Former Smoker    Smokeless tobacco: None    Alcohol use No    Drug use: None    Sexual activity: Not Asked     Other Topics Concern    None     Social History Narrative    None     Family History   Problem Relation Age of Onset    Heart Disease Father          Physical Exam    Visit Vitals    /59    Pulse 92    Temp 98.3 °F (36.8 °C)    Resp 17    Ht 5' 10\" (1.778 m)    Wt 150 lb 12.7 oz (68.4 kg)    SpO2 100%    BMI 21.64 kg/m2     General: awake, alert, and oriented. MAEx4. Frail, chronically ill, Able to speak in short sentences  HEENT Exam:     Normocephalic, pale, atraumatic. Sclera anicteric . Neck: supple, no lymphadenopathy  Lung Exam:     Barrel Chested, diminished throughout- Diminished coarse at bases, diffuse faint expiratory wheezing, on 3L NC     Heart Exam:       PMI nondisplaced. Irregularly, irregular rhythm. Sinus arrhythmia with bursts of rapid a flutter. Mild JVD, No HJR , no murmur      Abdomen Exam:      obese, soft, nontender. + Bowel sounds. No palpable masses; organomegaly. No bruits or pulsatile mass. :   Moore - hematuria    Extremities Exam:      Ecchymosis BUE, atrophic, no edema   Vascular Exam:      1+ radial and pedal pulses bilaterally.    Neuro exam:  Stuttering speech   Psy Exam: Normal affect, does not appear anxious or agitatied        Recent Results (from the past 12 hour(s))   FIBRINOGEN    Collection Time: 10/31/17  8:47 PM   Result Value Ref Range    Fibrinogen 390 200 - 475 mg/dL   CBC W/O DIFF    Collection Time: 10/31/17  8:47 PM   Result Value Ref Range    WBC 13.7 (H) 4.1 - 11.1 K/uL    RBC 3.29 (L) 4.10 - 5.70 M/uL    HGB 9.8 (L) 12.1 - 17.0 g/dL    HCT 28.7 (L) 36.6 - 50.3 %    MCV 87.2 80.0 - 99.0 FL    MCH 29.8 26.0 - 34.0 PG    MCHC 34.1 30.0 - 36.5 g/dL    RDW 13.5 11.5 - 14.5 %    PLATELET 74 (L) 150 - 400 K/uL   THEOPHYLLINE    Collection Time: 10/31/17 11:51 PM   Result Value Ref Range    Theophylline level 9.5 (L) 10.0 - 20.0 ug/mL   OSMOLALITY, SERUM/PLASMA    Collection Time: 11/01/17  4:25 AM   Result Value Ref Range    Osmolality, serum/plasma 273 mOsm/kg E4T   METABOLIC PANEL, BASIC    Collection Time: 11/01/17  4:25 AM   Result Value Ref Range    Sodium 127 (L) 136 - 145 mmol/L    Potassium 4.7 3.5 - 5.1 mmol/L    Chloride 91 (L) 97 - 108 mmol/L    CO2 27 21 - 32 mmol/L    Anion gap 9 5 - 15 mmol/L    Glucose 147 (H) 65 - 100 mg/dL    BUN 22 (H) 6 - 20 MG/DL    Creatinine 1.02 0.70 - 1.30 MG/DL    BUN/Creatinine ratio 22 (H) 12 - 20      GFR est AA >60 >60 ml/min/1.73m2    GFR est non-AA >60 >60 ml/min/1.73m2    Calcium 9.2 8.5 - 10.1 MG/DL   MAGNESIUM    Collection Time: 11/01/17  4:25 AM   Result Value Ref Range    Magnesium 1.9 1.6 - 2.4 mg/dL   PROTHROMBIN TIME + INR    Collection Time: 11/01/17  4:25 AM   Result Value Ref Range    INR 1.1 0.9 - 1.1      Prothrombin time 11.3 (H) 9.0 - 11.1 sec   CBC W/O DIFF    Collection Time: 11/01/17  4:25 AM   Result Value Ref Range    WBC 8.5 4.1 - 11.1 K/uL    RBC 3.19 (L) 4.10 - 5.70 M/uL    HGB 9.3 (L) 12.1 - 17.0 g/dL    HCT 27.1 (L) 36.6 - 50.3 %    MCV 85.0 80.0 - 99.0 FL    MCH 29.2 26.0 - 34.0 PG    MCHC 34.3 30.0 - 36.5 g/dL    RDW 13.5 11.5 - 14.5 %    PLATELET 81 (L) 100 - 400 K/uL   TSH 3RD GENERATION    Collection Time: 11/01/17  4:25 AM   Result Value Ref Range    TSH 0.74 0.36 - 3.74 uIU/mL     Luis Coon, RN-BSN  NP Student    Pt seen and examined agree w/ assessment and plan SATINDER Baker

## 2017-11-01 NOTE — PROGRESS NOTES
Nutrition Assessment:    RECOMMENDATIONS/INTERVENTION(S):   Continue Cardiac diet  Add Ensure Enlive once daily  Honor food preferences (banana with breakfast, chocolate milk breakfast& dinner)  Geriatric MVI daily  Encourage/assist with meal ordering  Will continue to monitor appetite/PO intake, diet tolerance, and acceptance of ONS    ASSESSMENT:   Received MST referral for decreased appetite. Chart reviewed. 81 yo male admitted with hematuria, A-fib. Visited pt this morning. Did not eat much for breakfast.  Assisted pt with ordering lunch and dinner meals. Drinks Ensure shake at home once daily at lunch and requested to receive chocolate milk with breakfast and dinner meals--RD to order. Pt stated, \"I'm not a big eater. \"  Labs/Meds reviewed. Na+ 127. K+ and Mg normal.  Receiving IVF, Folic Acid, Solumedrol. No significant weight changes but underweight for advanced age with BMI 21.6. Skin--no edema, bruising noted to BUE, chest.      SUBJECTIVE/OBJECTIVE:     Diet Order: Cardiac  % Eaten:  No data found. Pertinent Medications: [x] Reviewed    Past Medical History:   Diagnosis Date    COPD (chronic obstructive pulmonary disease) ([facility]     on home oxygen at 2.5L    HTN (hypertension)     Mediastinal mass     Paroxysmal atrial fibrillation ([facility] 11/1/2017    Prostate cancer ([facility]     Pulmonary nodule     Rheumatoid arthritis ([facility] [address] [facility]     followed by Dr. Chan Liter:  Lab Results   Component Value Date/Time    Sodium 127 11/01/2017 04:25 AM    Potassium 4.7 11/01/2017 04:25 AM    Chloride 91 11/01/2017 04:25 AM    CO2 27 11/01/2017 04:25 AM    Anion gap 9 11/01/2017 04:25 AM    Glucose 147 11/01/2017 04:25 AM    BUN 22 11/01/2017 04:25 AM    Creatinine 1.02 11/01/2017 04:25 AM    BUN/Creatinine ratio 22 11/01/2017 04:25 AM    GFR est AA >60 11/01/2017 04:25 AM    GFR est non-AA >60 11/01/2017 04:25 AM    Calcium 9.2 11/01/2017 04:25 AM    AST (SGOT) 37 10/31/2017 03:36 PM    Alk. phosphatase 70 10/31/2017 03:36 PM    Protein, total 6.1 10/31/2017 03:36 PM    Albumin 3.6 10/31/2017 03:36 PM    Globulin 2.5 10/31/2017 03:36 PM    A-G Ratio 1.4 10/31/2017 03:36 PM    ALT (SGPT) 23 10/31/2017 03:36 PM      Anthropometrics: Height: 5' 10\" (177.8 cm) Weight: 68.4 kg (150 lb 12.7 oz)    IBW (%IBW): 75.5 kg (166 lb 7.2 oz) ( ) UBW (%UBW):   (  %)    BMI: Body mass index is 21.64 kg/(m^2). This BMI is indicative of:   [x] Underweight for adv age   [] Normal    [] Overweight    []  Obesity    []  Extreme Obesity (BMI>40)  Estimated Nutrition Needs (Based on): 1918 Kcals/day (BMR (1390) x 1.2 AF + 250) , 68 g (-82 g/d (1.0-1.2 g/Kg actual body wt)) Protein  Carbohydrate:  At Least 130 g/day  Fluids: 1900 mL/day    Last BM: no record   [x]Active     []Hyperactive  []Hypoactive       [] Absent   BS  Skin:    [] Intact   [] Incision  [] Breakdown   [] DTI   [] Tears/Excoriation/Abrasion  []Edema [x] Other:  Bruising BUE   Wt Readings from Last 30 Encounters:   10/31/17 68.4 kg (150 lb 12.7 oz)      NUTRITION DIAGNOSES:   Problem:  Underweight     Etiology: related to advanced age     Signs/Symptoms: as evidenced by BMI 21.6      NUTRITION INTERVENTIONS:  Meals/Snacks: General/healthful diet   Supplements: Commercial supplement              GOAL:   Pt will consume/tolerate >/=50% meals/fluids and ONS in next 3-5 days    Cultural, Caodaism, or Ethnic Dietary Needs: None     EDUCATION & DISCHARGE NEEDS:    [x] None Identified   [] Identified and Education Provided/Documented   [] Identified and Pt declined/was not appropriate      [x] Pt discussed in interdisciplinary rounds   [x] Discharge Needs:  See d/c order   [] No Nutrition Related Discharge Needs    NUTRITION RISK:   Pt Is At Nutrition Risk  [x]     No Nutrition Risk Identified  []       PT SEEN FOR:    []  MD Consult: []Calorie Count      []Diabetic Diet Education        []Diet Education     []Electrolyte Management     []General Nutrition Management and Supplements     []Management of Tube Feeding     []TPN Recommendations    [x]  RN Referral:  [x]MST score >=2     []Enteral/Parenteral Nutrition PTA     []Pregnant: Gestational DM or Multigestation                 [] Pressure Ulcer      []  Low BMI      []  Length of Stay       [] Dysphagia Diet         [] Ventilator      []  Follow-Up      Previous Recommendations:   [] Implemented          [] Not Implemented          [x] Not Applicable    Previous Goal:   [] Met              [] Progressing Towards Goal              [] Not Progressing Towards Goal   [x] Not Applicable              Rocco Jacques, [address] [place] [address]   Pager [phone]

## 2017-11-01 NOTE — CARDIO/PULMONARY
Cardiac Rehab: Received consult for cardiac education on AFib. 81 yo admitted with chronic indwelling urinary catheter & gross hematuria  (10/31). Per Dr Gianni John, dx also includes: AFib with RVR & WCT. LVEF 55-60% by echo (11/1/17).    Cardiac Meds:  ACE/ARB - none  BB - metoprolol succinate  Statin - none   ASA - none  Prior to admission meds also included: Lasix. New Cardiac Medications:    Smoking History: Former smoker (quit 1992). Per wife, pt started smoking at age 21, up to 1 ppd, and quit at age 62.    11/1/2017 Met briefly with Misa Sultana on IVCU. Continuous urinary bladder irrigation in progress. Pt reported he resides with his wife & adult son in [facility], [address] [facility] Discussed pt's understanding of his cardiac condition and tx plan. Pt reported he was not aware of having AFib in the past. Also indicated he was hoping to be discharged home today. He was unable to remember when he quit smoking, but thought it was at least 30 yrs ago. Pt stated he would prefer to discuss AFib when his wife was present. Provided AFib booklet.  Cardiac teaching deferred until wife available.    Willian Lopez

## 2017-11-01 NOTE — CONSULTS
Name: Ina Akins: [address]   : 1935 Admit Date: 10/31/2017   Phone: [phone]  Room: [address] [place] [address]   PCP: Alan Lal MD  MRN: [id number]   Date: 2017  Code: DNR        HPI:    Chart and notes reviewed. Data reviewed. I review the patient's current medications in the medical record at each encounter. I have evaluated and examined the patient. 8:44 AM       History was obtained from patient and chart. I was asked by Osito Newman MD to see Angela Kimbrough in consultation for a chief complaint of COPD and lung mass. Mr. Zoe Welsh is a pleasant 80year old male with long standing COPD and stable lung mass who presented to the [facility] ED with gorss hematuria from chronic indwelling jha 3 days ago. Denies known trauma or injury. Apparently evaluated in [facility] ED the day prior to admission and sent home. Did have some dysuria initially, but that has improved/resolved. Reports some new bruising. Denies bloody stools or rectal bleeding. Denies fever or chills. Denies CP. Reports his breathing to be stable and his SOB to be at his baseline, however was noted to be wheezing at admission and reported feeling more SOB than usual to the hospitalist.  Has severe COPD at baseline with last FEV1 down below 1 liter now at 0.80 which is 26% of predicted. His currently COPD regimen includes Duonebs, Symbicort, Singulair, Theophylline, prednisone 10 to 20 mg daily, and Bactrim MWF for PCP prophylaxis. He is O2 dependent on 2.5L. He also has RA for which he take methotrexate and is followed by Dr. Susy Dodson. Carries a diagnosis of prostate cancer which is states is stable for which he is treated with Lupron. Overnight, developed short run of VT, then sustained afib with RVR. He is now on dilt drip. Chest CT personally visualized and compared to prior images from  and .   There is stable anterior mediastinal mass that measures 4.5 x 3.6 x 3.9 cm. There are emphysematous changes. There is stable 4 mm right upper lobe nodule. There are three (thoracic and lumbar) compression fractures, new since 2013, but age  Indeterminate. WBC 8.5 (down from 13.7 at admission)  Hgb 9.3 (9.8 at admission)  Plt 81  Na 127 (125 at admission)  Creat 1.02  INR 1.0  Fibrinogen 390  TSH 0.74    Past Medical History:   Diagnosis Date    COPD (chronic obstructive pulmonary disease) (HCC)     on home oxygen at 2.5L    HTN (hypertension)     Mediastinal mass     Prostate cancer (HCC)     Pulmonary nodule     Rheumatoid arthritis ([facility] [address]     followed by Dr. Conway Cea       No past surgical history on file.     Family History   Problem Relation Age of Onset    Heart Disease Father        Social History   Substance Use Topics    Smoking status: Former Smoker    Smokeless tobacco: Not on file    Alcohol use No       No Known Allergies    Current Facility-Administered Medications   Medication Dose Route Frequency    dilTIAZem (CARDIZEM) injection 10 mg  10 mg IntraVENous ONCE    dilTIAZem (CARDIZEM) 125 mg in dextrose 5% 125 mL infusion  0-15 mg/hr IntraVENous CONTINUOUS    sodium chloride (NS) flush 5-10 mL  5-10 mL IntraVENous Q8H    sodium chloride (NS) flush 5-10 mL  5-10 mL IntraVENous PRN    0.9% sodium chloride infusion  75 mL/hr IntraVENous CONTINUOUS    acetaminophen (TYLENOL) tablet 650 mg  650 mg Oral Q4H PRN    ondansetron (ZOFRAN) injection 4 mg  4 mg IntraVENous Q4H PRN    methylPREDNISolone (PF) (SOLU-MEDROL) injection 60 mg  60 mg IntraVENous Q6H    albuterol-ipratropium (DUO-NEB) 2.5 MG-0.5 MG/3 ML  3 mL Nebulization Q4HWA RT    albuterol (PROVENTIL VENTOLIN) nebulizer solution 2.5 mg  2.5 mg Nebulization Q2H PRN    cefTRIAXone (ROCEPHIN) 1 g in 0.9% sodium chloride (MBP/ADV) 50 mL  1 g IntraVENous Q24H    busPIRone (BUSPAR) tablet 5 mg  5 mg Oral BID    folic acid (FOLVITE) tablet 1 mg  1 mg Oral DAILY WITH DINNER    metoprolol succinate (TOPROL-XL) XL tablet 25 mg  25 mg Oral BID    montelukast (SINGULAIR) tablet 10 mg  10 mg Oral DAILY WITH DINNER    pantoprazole (PROTONIX) tablet 40 mg  40 mg Oral ACB    theophylline ER (FRANCISCO-24) capsule 200 mg  200 mg Oral BID         REVIEW OF SYSTEMS   12 point ROS negative except as stated in the HPI. Physical Exam:   Visit Vitals    /59    Pulse 92    Temp 98.3 °F (36.8 °C)    Resp 17    Ht 5' 10\" (1.778 m)    Wt 68.4 kg (150 lb 12.7 oz)    SpO2 100%    BMI 21.64 kg/m2       General:  Alert, cooperative, no distress, appears stated age. Head:  Normocephalic, without obvious abnormality, atraumatic. Eyes:  Conjunctivae/corneas clear. Nose: Nares normal. Septum midline. Mucosa normal.    Throat: Lips, mucosa, and tongue normal.    Neck: Supple, symmetrical, trachea midline, no adenopathy. Lungs:   Diminished bilaterally with trace end exp wheeze. Chest wall:  No tenderness or deformity. Heart:  Irregular, rate 13K, no murmur, click, rub or gallop. Abdomen:   Soft, non-tender. Bowel sounds normal. No masses,  No organomegaly. Extremities: Extremities normal, atraumatic, no cyanosis or edema. Pulses: 2+ and symmetric all extremities.    Skin: Scattered ecchymosis, decreased skin turgor normal.    Lymph nodes: Cervical, supraclavicular nodes normal.   Neurologic: Grossly nonfocal       Lab Results   Component Value Date/Time    Sodium 127 11/01/2017 04:25 AM    Potassium 4.7 11/01/2017 04:25 AM    Chloride 91 11/01/2017 04:25 AM    CO2 27 11/01/2017 04:25 AM    BUN 22 11/01/2017 04:25 AM    Creatinine 1.02 11/01/2017 04:25 AM    Glucose 147 11/01/2017 04:25 AM    Calcium 9.2 11/01/2017 04:25 AM    Magnesium 1.9 11/01/2017 04:25 AM    Lactic acid 1.7 10/31/2017 03:36 PM       Lab Results   Component Value Date/Time    WBC 8.5 11/01/2017 04:25 AM    HGB 9.3 11/01/2017 04:25 AM    PLATELET 81 05/79/0920 04:25 AM    MCV 85.0 11/01/2017 04:25 AM       Lab Results   Component Value Date/Time    INR 1.1 11/01/2017 04:25 AM    aPTT 29.5 10/31/2017 03:45 PM    AST (SGOT) 37 10/31/2017 03:36 PM    Alk. phosphatase 70 10/31/2017 03:36 PM    Protein, total 6.1 10/31/2017 03:36 PM    Albumin 3.6 10/31/2017 03:36 PM    Globulin 2.5 10/31/2017 03:36 PM       No results found for: IRON, FE, TIBC, IBCT, PSAT, FERR    Lab Results   Component Value Date/Time    TSH 0.74 11/01/2017 04:25 AM        No results found for: PH, PHI, PCO2, PCO2I, PO2, PO2I, HCO3, HCO3I, FIO2, FIO2I    Lab Results   Component Value Date/Time     10/31/2017 03:36 PM    CK-MB Index 2.7 10/31/2017 03:36 PM    Troponin-I, Qt. <0.04 10/31/2017 03:36 PM        No results found for: CULT    No results found for: TOXA1, RPR, HBCM, HBSAG, HAAB, HCAB1, HAAT, G6PD, CRYAC, HIVGT, HIVR, HIV1, HIV12, HIVPC, HIVRPI    Lab Results   Component Value Date/Time     10/31/2017 03:36 PM       No results found for: COLOR, APPRN, SPGRU, BRANT, PROTU, GLUCU, KETU, BILU, BLDU, UROU, NAVEED, LEUKU, WBCU, RBCU, UEPI, BACTU, CASTS, UCRY    IMPRESSION  · Hematuria  · Atrial fibrillation with RVR  · Chronic hypoxia  · COPD with potential exacerbation  · Hyponatremia  · Thrombocytopenia  · Abnormal chest CT: mediastinal mass and RUL nodule; stable since 2012  · RA  · HTN  · Hx of prostate cancer: currently treated with Lupron    PLAN  · Urology following. Continue bladder irrigation and f/u CT abd/pelvis  · Continue NS IVFs; watch Na  · Hematology consult pending; holding MTX for now  · Cardiology has evaluated; f/u ECHO. Rate control with dilt drip and metoprolol. · Place on Brovana/Pulmicort nebs is substitution for home Symbicort. Continue Singulair and Theophylline.   Continue Duonebs  · Continue O2 to keep sats > 90%; baseline is 2.5L  · Solumedrol weaned to 60 mg IV q 8hr  · Continue empiric ceftriaxone and f/u urine cx  · GI prophylaxis: Protonix (on at baseline)  · DVT prophylaxis: SCDs      Thank you for allowing us to participate in the care of this patient. We will be happy to follow along in his progress with you.     Mechelle Aldridge

## 2017-11-01 NOTE — PROGRESS NOTES
Discussed care with Dr Ebony Burkitt, pts son in law today  Reviewed oncology input - MTX may be also contributing to functional plt dysfunction   CAProstate 2012 on lupron, no XRT    PLT low but  Would consider transfusion if hematuria persist emily if CT negative  Would review with Dr Luis Phan, GREGORY need for addl cysto evaluation  Pt and family Chace Kelly MD) hesitant for aggressive Tx in light of DNR, prior Hx, COPD, CAD arrythmia - anesthetic risk  Would be possible candidate for spinal if needs minor procedure for clot evacuation for example    Await Ct scn results - Chest CT not good hematuria evaluation  CBI for now titrate to clear UOP  Irrigated ok this am    Thanks

## 2017-11-01 NOTE — PROGRESS NOTES
CM met with pt's wife, Ginna Pérez, to obtain initial assessment information. Pt lives with his wife and son in a two story house with a portable ramp. Pt has reportedly been bed-bound for a year and a half. His wife is a retired nurse and his son is very helpful. Pt is currently open with [facility] for nursing. Home DME includes a hospital bed, w/c, walker and O2 (from [facility] out of [place]). Pt has Rx coverage and obtains his medications via [employer] [facility] or from [employer] [facility]  in [place]. The discharge plan is for pt to return home with resumption of home health services. Veronika Smith LCSW    Care Management Interventions  PCP Verified by CM:  Yes (Dr. Vinod Gordon)  Palliative Care Criteria Met (RRAT>21 & CHF Dx)?: No  Discharge Durable Medical Equipment: No  Physical Therapy Consult: Yes  Occupational Therapy Consult: Yes  Speech Therapy Consult: No  Current Support Network: Lives with Spouse  Plan discussed with Pt/Family/Caregiver: Yes  Discharge Location  Discharge Placement: Home with home health

## 2017-11-01 NOTE — PROGRESS NOTES
Sean Murillo [facility] [address], [place], [address] [facility]  [phone]      Medical Progress Note      NAME: Nadeen Jimenez   :  1935  MRM:  [id number]    Date/Time: 2017         Subjective:     Chief Complaint:  Patient was seen and examined by me. Chart reviewed. Patient with afib RVR this AM, now on dilt gtt. Still with hematuria       Objective:       Vitals:       Last 24hrs VS reviewed since prior progress note.  Most recent are:    Visit Vitals    /59    Pulse 92    Temp 98.3 °F (36.8 °C)    Resp 17    Ht 5' 10\" (1.778 m)    Wt 68.4 kg (150 lb 12.7 oz)    SpO2 100%    BMI 21.64 kg/m2     SpO2 Readings from Last 6 Encounters:   17 100%    O2 Flow Rate (L/min): 2.5 l/min     Intake/Output Summary (Last 24 hours) at 17 0854  Last data filed at 17 0735   Gross per 24 hour   Intake         18131.25 ml   Output            64407 ml   Net          8831.25 ml        Exam:     Physical Exam:    Gen:  Disheveled, elderly, chronically ill appearing, NAD  HEENT:  Pink conjunctivae, PERRL, hearing intact to voice, moist mucous membranes  Neck:  Supple, without masses, thyroid non-tender  Resp:  No accessory muscle use, poor air movement   Card:  No murmurs, normal S1, S2 without thrills, bruits or peripheral edema  Abd:  Soft, non-tender, non-distended, normoactive bowel sounds are present  Musc:  No cyanosis or clubbing  Skin:  Multiple ecchymosis on arms  Neuro:  Cranial nerves 3-12 are grossly intact, follows commands appropriately  Psych:  Good insight, oriented to person, place and time, alert    Medications Reviewed: (see below)    Lab Data Reviewed: (see below)    ______________________________________________________________________    Medications:     Current Facility-Administered Medications   Medication Dose Route Frequency    dilTIAZem (CARDIZEM) injection 10 mg  10 mg IntraVENous ONCE    dilTIAZem (CARDIZEM) 125 mg in dextrose 5% 125 mL infusion  0-15 mg/hr IntraVENous CONTINUOUS    albuterol-ipratropium (DUO-NEB) 2.5 MG-0.5 MG/3 ML  3 mL Nebulization Q6H RT    methylPREDNISolone (PF) (SOLU-MEDROL) injection 60 mg  60 mg IntraVENous Q8H    sodium chloride (NS) flush 5-10 mL  5-10 mL IntraVENous Q8H    sodium chloride (NS) flush 5-10 mL  5-10 mL IntraVENous PRN    0.9% sodium chloride infusion  75 mL/hr IntraVENous CONTINUOUS    acetaminophen (TYLENOL) tablet 650 mg  650 mg Oral Q4H PRN    ondansetron (ZOFRAN) injection 4 mg  4 mg IntraVENous Q4H PRN    albuterol (PROVENTIL VENTOLIN) nebulizer solution 2.5 mg  2.5 mg Nebulization Q2H PRN    cefTRIAXone (ROCEPHIN) 1 g in 0.9% sodium chloride (MBP/ADV) 50 mL  1 g IntraVENous Q24H    busPIRone (BUSPAR) tablet 5 mg  5 mg Oral BID    folic acid (FOLVITE) tablet 1 mg  1 mg Oral DAILY WITH DINNER    metoprolol succinate (TOPROL-XL) XL tablet 25 mg  25 mg Oral BID    montelukast (SINGULAIR) tablet 10 mg  10 mg Oral DAILY WITH DINNER    pantoprazole (PROTONIX) tablet 40 mg  40 mg Oral ACB    theophylline ER (FRANCISCO-24) capsule 200 mg  200 mg Oral BID          Lab Review:     Recent Labs      11/01/17   0425  10/31/17   2047  10/31/17   1536   WBC  8.5  13.7*  15.0*   HGB  9.3*  9.8*  10.5*   HCT  27.1*  28.7*  30.3*   PLT  81*  74*  70*     Recent Labs      11/01/17   0425  10/31/17   1545  10/31/17   1536   NA  127*   --   125*   K  4.7   --   4.6   CL  91*   --   86*   CO2  27   --   28   GLU  147*   --   111*   BUN  22*   --   22*   CREA  1.02   --   1.15   CA  9.2   --   9.2   MG  1.9   --   1.7   ALB   --    --   3.6   TBILI   --    --   1.0   SGOT   --    --   37   ALT   --    --   23   INR  1.1  1.0   --      No results found for: GLUCPOC       Assessment / Plan:     Principal Problem:    81 yo hx of HTN, COPD, mediastinal mass, RA, prostate CA w/ chronic jha, chronic debility, presented w/ hematuria.   Incidental findings of afib RVR, hyponatremia, thrombocytopenia, multiple compression fx of spine, possible pancreatic mass    1) Gross hematuria: has chronic indwelling catheter. Also hx of prostate CA. Cont bladder irrigation. Awaiting abd CT. Urology following    2) Complicated UTI related to an indwelling catheter: monitor urine Cx. Cont IV CTX    3) Afib RVR: new onset, no prior hx of afib. Will cont dilt gtt. Order Echo. Cards to eval    4) COPD exacerbation/chronic resp failure/hypoxia: on chronic 2.5L O2. Will cont duo nebs, IV steroids. Pulm to see    5) Mediastinal mass: seems chronic. Defer to pulm    6) Thrombocytopenia/ecchymosis: could be from MTX, underlying cancer. Holding MTX, NSAIDS. Hematology to eval    7) Hyponatremia: likely due to volume depletion. Cont IVF. Monitor BMP    8) HTN: cont toprol    9) RA: holding MTX/bactrim    10) Multiple spine compression fracture: incidental findings on CT of T2, T9, L2. Chronically debilitated at home. Cont pain control.   Consult PT/OT    Total time spent with patient: [phone] [facility] discussed with: Patient, nursing    Discussed:  Care Plan    Prophylaxis:  SCD's    Disposition:  SNF/LTC vs HH           ___________________________________________________    Attending Physician: Gregg Deleon MD

## 2017-11-01 NOTE — ROUTINE PROCESS
TRANSFER - OUT REPORT:    Verbal report given to GILBERTO Ramachandran(name) on Nighat Gatica  being transferred to ICU- step down overflow(unit) for routine progression of care       Report consisted of patients Situation, Background, Assessment and   Recommendations(SBAR). Information from the following report(s) SBAR, ED Summary, MAR, Recent Results and Cardiac Rhythm a-fib was reviewed with the receiving nurse. Lines:   Peripheral IV 10/31/17 Right Antecubital (Active)   Site Assessment Clean, dry, & intact 10/31/2017  3:46 PM   Phlebitis Assessment 0 10/31/2017  3:46 PM   Infiltration Assessment 0 10/31/2017  3:46 PM   Dressing Status Clean, dry, & intact 10/31/2017  3:46 PM   Dressing Type Transparent 10/31/2017  3:46 PM   Hub Color/Line Status Pink 10/31/2017  3:46 PM   Action Taken Catheter retaped 10/31/2017  3:46 PM       Peripheral IV 10/31/17 Left Antecubital (Active)   Site Assessment Clean, dry, & intact 10/31/2017 10:03 PM   Phlebitis Assessment 0 10/31/2017 10:03 PM   Infiltration Assessment 0 10/31/2017 10:03 PM   Dressing Status Clean, dry, & intact 10/31/2017 10:03 PM   Dressing Type Transparent 10/31/2017 10:03 PM   Hub Color/Line Status Pink 10/31/2017 10:03 PM        Opportunity for questions and clarification was provided.       Patient transported with:   Monitor  O2 @ 3 liters  Registered Nurse  [facility]

## 2017-11-01 NOTE — PROGRESS NOTES
Primary Nurse Anya Esparza RN and Ladonna Hughes RN performed a dual skin assessment on this patient Impairment noted- see wound doc flow sheet  Ramo score is 10

## 2017-11-01 NOTE — PROGRESS NOTES
Urology Progress Note    Subjective:     Daily Progress Note: 2017 7:19 AM    Leigh Sweeney is doing fair. He reports pain is well controlled. He has no new complaints. He is tolerating clear liquids and unable to get out of bed. Urine appearance is bloody. Objective:     Visit Vitals    /87    Pulse (!) 169    Temp 98.5 °F (36.9 °C)    Resp 17    Ht 5' 10\" (1.778 m)    Wt 68.4 kg (150 lb 12.7 oz)    SpO2 94%    BMI 21.64 kg/m2        Temp (24hrs), Av.7 °F (36.5 °C), Min:97.3 °F (36.3 °C), Max:98.5 °F (36.9 °C)      Intake and Output:  10/30 1901 -  0700  In: 98147.3 [P.O.:50; I.V.:681.3]  Out: 03811 [[id number]]  701 -  1900  In: 1500   Out: 1800 [Urine:1800]    Physical Exam:   General appearance: alert, cooperative, no distress, appears stated age  Abdomen: soft, non-tender.  Bowel sounds normal. No masses,  no organomegaly, jha pink but no clots with 1x irrigation, bruising extensive in LE and arms    Incision: NA    Lab/Data Review:  BMP:   Lab Results   Component Value Date/Time     (L) 2017 04:25 AM    K 4.7 2017 04:25 AM    CL 91 (L) 2017 04:25 AM    CO2 27 2017 04:25 AM    AGAP 9 2017 04:25 AM     (H) 2017 04:25 AM    BUN 22 (H) 2017 04:25 AM    CREA 1.02 2017 04:25 AM    GFRAA >60 2017 04:25 AM    GFRNA >60 2017 04:25 AM     CMP:   Lab Results   Component Value Date/Time     (L) 2017 04:25 AM    K 4.7 2017 04:25 AM    CL 91 (L) 2017 04:25 AM    CO2 27 2017 04:25 AM    AGAP 9 2017 04:25 AM     (H) 2017 04:25 AM    BUN 22 (H) 2017 04:25 AM    CREA 1.02 2017 04:25 AM    GFRAA >60 2017 04:25 AM    GFRNA >60 2017 04:25 AM    CA 9.2 2017 04:25 AM    MG 1.9 2017 04:25 AM    ALB 3.6 10/31/2017 03:36 PM    TP 6.1 (L) 10/31/2017 03:36 PM    GLOB 2.5 10/31/2017 03:36 PM    AGRAT 1.4 10/31/2017 03:36 PM    SGOT 37 10/31/2017 03:36 PM    ALT 23 10/31/2017 03:36 PM     CBC:   Lab Results   Component Value Date/Time    WBC 8.5 11/01/2017 04:25 AM    HGB 9.3 (L) 11/01/2017 04:25 AM    HCT 27.1 (L) 11/01/2017 04:25 AM    PLT 81 (L) 11/01/2017 04:25 AM     COAGS:   Lab Results   Component Value Date/Time    APTT 29.5 10/31/2017 03:45 PM    PTP 11.3 (H) 11/01/2017 04:25 AM    INR 1.1 11/01/2017 04:25 AM       Assessment/Plan:     Principal Problem:    Gross hematuria (10/31/2017)    Active Problems:    COPD exacerbation (HCC) (10/31/2017)      Prostate cancer ([facility] [address] ()      Mediastinal mass ()      Pulmonary nodule ()      HTN (hypertension) ()      Thrombocytopenia ([facility] [address] (10/31/2017)        Plan:  Doing well and continue with treatment  Hematuria:  continue bladder irrigation  recc evaluation of low platelets which is contrib to hematuria     Adv prostate cancer, chronic jha last change 5 days ago home RN  Dr Hilario Martínez pt marylu Soto him know whats going on  Will check CT ABD pelvis + contrast for hematuria eval for pathology      Signed By: Talon Reeves MD                         November 1, 2017

## 2017-11-01 NOTE — ED NOTES
After 1250ml of irrigation fluid and several clot removals, patient's jha is beginning to run clear. Irrigation stopped at this time, will continue to monitor. Family at bedside. Patient resting comfortably.

## 2017-11-01 NOTE — CONSULTS
Urology Consult    Patient: Austin Bowers MRN: [id number]  SSN: [id number]    YOB: 1935  Age: 80 y.o. Sex: male          Date of Consultation:  October 31, 2017  Requesting Physician: Gissel Castro MD  Reason for Consultation: Hematuria           Assessment/Plan:  Gross hematuria. Hx prostate cancer. 3 way jha in place. Irrigated clots at bedside. CBI running clear to pink. Titrate flow to keep urine clear. Urology to follow up in am. Consider renal US. History of Present Illness:  Patient is a 80 y.o. male admitted 10/31/2017 to the hospital for Gross hematuria. He  is a 80 y.o.  male who is admitted with Gross hematuria. Mr. Poonam Holman presented to the Emergency Department today complaining of gross blood from chronic indwelling jha 3 days ago. No trauma or injury reported. Was seen in [facility] ED and sent home yesterday. No fever, chills. Also noted bruising to arms and legs about the same time. Denies changes to medications. No bleeding from gums. No blood in stool in BM this AM.  No prior hx of bleeding. Past Medical History:  No Known Allergies   Prior to Admission medications    Medication Sig Start Date End Date Taking? Authorizing Provider   albuterol-ipratropium (DUO-NEB) 2.5 mg-0.5 mg/3 ml nebu 3 mL by Nebulization route every four (4) hours as needed. Yes Historical Provider   budesonide-formoterol (SYMBICORT) 160-4.5 mcg/actuation HFAA Take 2 Puffs by inhalation two (2) times a day. Yes Historical Provider   BUSPIRONE HCL (BUSPAR PO) Take 5 mg by mouth two (2) times a day. Yes Historical Provider   celecoxib (CELEBREX) 200 mg capsule Take 200 mg by mouth daily. Yes Historical Provider   ciprofloxacin HCl (CIPRO) 500 mg tablet Take 500 mg by mouth two (2) times a day. 10/30/17 11/5/17 Yes Historical Provider   folic acid (FOLVITE) 1 mg tablet Take 1 mg by mouth daily (with dinner).    Yes Historical Provider   furosemide (LASIX) 20 mg tablet Take 20 mg by mouth daily. Yes Historical Provider   metoprolol succinate (TOPROL XL) 25 mg XL tablet Take 25 mg by mouth two (2) times a day. Yes Historical Provider   montelukast (SINGULAIR) 10 mg tablet Take 10 mg by mouth daily (with dinner). Yes Historical Provider   pantoprazole (PROTONIX) 40 mg tablet Take 40 mg by mouth Daily (before breakfast). Yes Historical Provider   trimethoprim-sulfamethoxazole (BACTRIM DS) 160-800 mg per tablet Take 1 Tab by mouth every Monday, Wednesday, Friday. Yes Historical Provider   theophylline ER (FRANCISCO-24) 400 mg cp24 capsule Take 200 mg by mouth two (2) times a day. Yes Historical Provider   predniSONE (DELTASONE) 10 mg tablet Take 10 mg by mouth daily (with breakfast). Yes Historical Provider   naproxen sodium (ALEVE) 220 mg tablet Take 220 mg by mouth every six (6) hours. Yes Historical Provider   methotrexate (RHEUMATREX) 2.5 mg tablet Take 15 mg by mouth every Wednesday. Yes Historical Provider      PMHx:  has a past medical history of COPD (chronic obstructive pulmonary disease) ([facility] [address] HTN (hypertension); Mediastinal mass; Prostate cancer ([facility] [address] Pulmonary nodule; and Rheumatoid arthritis ([facility] [address] PSurgHx:  has no past surgical history on file. PSocHx:  reports that he has quit smoking. He does not have any smokeless tobacco history on file. He reports that he does not drink alcohol. ROS:  Admission ROS by Miguel Kaur MD from 10/31/2017 were reviewed with the patient and changes (other than per HPI) include: none. Physical Exam:    Vitals:   Temp (24hrs), Av.5 °F (36.4 °C), Min:97.3 °F (36.3 °C), Max:97.6 °F (36.4 °C)   Blood pressure 128/61, pulse (!) 116, temperature 97.3 °F (36.3 °C), resp. rate 16, height 5' 10\" (1.778 m), weight 68.4 kg (150 lb 12.7 oz), SpO2 99 %.   I&O's:     GENERAL: WD, elderly male, NAD  SKIN:  Thin, ecchymotic upper extremities  ABDOMEN: Soft, non-tender without masses  FLANKS: No CVAT  BLADDER: Not palpable  :  Normal male phallus, scrotum and contents normal  LYMPH: No cervical,k supraclavicular or axillary MARTHA      Lab Results   Component Value Date/Time    WBC 13.7 10/31/2017 08:47 PM    HCT 28.7 10/31/2017 08:47 PM    PLATELET 74 22/78/8122 08:47 PM    Sodium 125 10/31/2017 03:36 PM    Potassium 4.6 10/31/2017 03:36 PM    Chloride 86 10/31/2017 03:36 PM    CO2 28 10/31/2017 03:36 PM    BUN 22 10/31/2017 03:36 PM    Creatinine 1.15 10/31/2017 03:36 PM    Glucose 111 10/31/2017 03:36 PM    Calcium 9.2 10/31/2017 03:36 PM    Magnesium 1.7 10/31/2017 03:36 PM    INR 1.0 10/31/2017 03:45 PM       UA: No results found for: COLOR, APPRN, SPGRU, REFSG, BRANT, PROTU, GLUCU, KETU, BILU, UROU, NAVEED, LEUKU, GLUKE, EPSU, BACTU, WBCU, RBCU, CASTS, UCRY    Cultures:     Xrays:     Signed By: Fidel Ledezma MD  - October 31, 2017

## 2017-11-01 NOTE — CONSULTS
[id number] [facility] at [address] [place] [address]  [phone]    Hematology / Oncology Consult    Reason for Visit:   Carmelita Graham is a 80 y.o. male who is seen in consultation at the request of Dr. Elvi Valverde for evaluation of thrombocytopenia, ecchymosis, poss pancreatic mass. Thiago Wheat History of Present Illness:     Mr [address] was admitted from 10/31/2017 from the ED when he presented with c/o blood in jha cath x 4 days. Seen in [facility] ED. ED labs plts 70 WBC 15 Hgb 10.5 Na 125 Therefore he was admitted for further eval and management. Mr Chris Gray reports has had the jha for approx 2 years after he was in the hospital for several months and went to rehab after falling at home. [facility] nurse changes it monthly. He has never noticed blood before. Wife reports she noticed blood in the jha on Saturday; Sun the [facility] nurse came out and changed the jha; Mon they continued to notice blood so they went to the local hospital. He was given a rx for Cipro for possible UTI. Monday night; there wasn't any urine coming out if the jha so the wife removed the jha and put a new jha in. Tues the [facility] nurse came back out and there was still blood so they came to [address] [place] [address]. Ms [address] reports her  fell approx 1 1/2 yrs ago and did not want to go back to rehab; since that time he has bed bound; only gets up to use the bedside commode. Eats his meals in bed. Unsure how long he has been on MTX; > 5 yrs but not more than 10 years. Takes it every Wed; last dose 10/25/2017. Was following with Dr Aziza Livingston for prostate cancer; receiving Lupron injection based on PSA. Bruising to upper arms has become more severe over the last week. Mr Chris Gray denies pain, SOB or N/V.        Review of Labs from Dr Syeda Singh dated 5/18/2016  Hgb 12.8 MCV 92 plts 261 WBC 13.5  ANC 12.5   Was taking MTX 15 mg once a week       Past Medical History:   Diagnosis Date    COPD (chronic obstructive pulmonary disease) ([facility] [address] on home oxygen at 2.5L    HTN (hypertension)     Mediastinal mass     Paroxysmal atrial fibrillation (HCC) 11/1/2017    Pro[facility] ([facility] [address]     Pulmonary nodule     Rheumatoid arthritis (HCC)     followed by Dr. Bharath Lutz      No past surgical history on file.    Social History   Substance Use Topics    Smoking status: Former Smoker    Smokeless tobacco: Not on file    Alcohol use No      Family History   Problem Relation Age of Onset    Heart Disease Father      Current Facility-Administered Medications   Medication Dose Route Frequency    dilTIAZem (CARDIZEM) 125 mg in dextrose 5% 125 mL infusion  0-15 mg/hr IntraVENous CONTINUOUS    albuterol-ipratropium (DUO-NEB) 2.5 MG-0.5 MG/3 ML  3 mL Nebulization Q6H RT    methylPREDNISolone (PF) (SOLU-MEDROL) injection 60 mg  60 mg IntraVENous Q8H    budesonide (PULMICORT) 500 mcg/2 ml nebulizer suspension  500 mcg Nebulization BID RT    arformoterol (BROVANA) neb solution 15 mcg  15 mcg Nebulization BID RT    sodium chloride (NS) flush 5-10 mL  5-10 mL IntraVENous Q8H    sodium chloride (NS) flush 5-10 mL  5-10 mL IntraVENous PRN    0.9% sodium chloride infusion  75 mL/hr IntraVENous CONTINUOUS    acetaminophen (TYLENOL) tablet 650 mg  650 mg Oral Q4H PRN    ondansetron (ZOFRAN) injection 4 mg  4 mg IntraVENous Q4H PRN    albuterol (PROVENTIL VENTOLIN) nebulizer solution 2.5 mg  2.5 mg Nebulization Q2H PRN    cefTRIAXone (ROCEPHIN) 1 g in 0.9% sodium chloride (MBP/ADV) 50 mL  1 g IntraVENous Q24H    busPIRone (BUSPAR) tablet 5 mg  5 mg Oral BID    folic acid (FOLVITE) tablet 1 mg  1 mg Oral DAILY WITH DINNER    metoprolol succinate (TOPROL-XL) XL tablet 25 mg  25 mg Oral BID    montelukast (SINGULAIR) tablet 10 mg  10 mg Oral DAILY WITH DINNER    pantoprazole (PROTONIX) tablet 40 mg  40 mg Oral ACB    theophylline ER (FRANCISCO-24) capsule 200 mg  200 mg Oral BID      No Known Allergies     Review of Systems: A complete review of systems was obtained, negative except as described above. Physical Exam:     Visit Vitals    BP 94/46    Pulse 88    Temp 98 °F (36.7 °C)    Resp 17    Ht 5' 10\" (1.778 m)    Wt 150 lb 12.7 oz (68.4 kg)    SpO2 98%    BMI 21.64 kg/m2     ECOG PS: 3-4  General: No distress  Eyes: PERRLA, anicteric sclerae  HENT: Atraumatic with normal appearance of ears and nose; OP clear  Neck: Supple; no thyromegaly   Lymphatic: No cervical, supraclavicular, or axillary adenopathy  Respiratory: diminished bilaterally;scattered exp wheezing;, normal respiratory effort; O2 in use  CV: irregular rate,irregular rhythm, no murmurs, no peripheral edema   jha cath with CBI; hematuria noted; no clots  GI: Soft, nontender, nondistended, no masses, no hepatomegaly, no splenomegaly  Skin: ecchymoses bilateral upper extremity; scattered LE; no rash or petechiae. Normal temperature, fair turgor, and texture. Lobito/Psych: Alert, oriented, appropriate affect, fair judgment/insight      Results:     Lab Results   Component Value Date/Time    WBC 9.8 11/01/2017 12:49 PM    HGB 9.0 11/01/2017 12:49 PM    HCT 27.4 11/01/2017 12:49 PM    PLATELET 278 09/33/1654 12:49 PM    MCV 87.5 11/01/2017 12:49 PM    ABS. NEUTROPHILS 9.2 11/01/2017 12:49 PM     Lab Results   Component Value Date/Time    Sodium 127 11/01/2017 04:25 AM    Potassium 4.7 11/01/2017 04:25 AM    Chloride 91 11/01/2017 04:25 AM    CO2 27 11/01/2017 04:25 AM    Glucose 147 11/01/2017 04:25 AM    BUN 22 11/01/2017 04:25 AM    Creatinine 1.02 11/01/2017 04:25 AM    GFR est AA >60 11/01/2017 04:25 AM    GFR est non-AA >60 11/01/2017 04:25 AM    Calcium 9.2 11/01/2017 04:25 AM    Creatinine (POC) 0.9 06/26/2013 12:04 PM     Lab Results   Component Value Date/Time    Bilirubin, total 1.0 10/31/2017 03:36 PM    ALT (SGPT) 23 10/31/2017 03:36 PM    AST (SGOT) 37 10/31/2017 03:36 PM    Alk.  phosphatase 70 10/31/2017 03:36 PM    Protein, total 6.1 10/31/2017 03:36 PM    Albumin 3.6 10/31/2017 03:36 PM    Globulin 2.5 10/31/2017 03:36 PM     Lab Results   Component Value Date/Time    Reticulocyte count 2.2 11/01/2017 12:49 PM    Ferritin 585 11/01/2017 02:26 PM    Folate 17.5 11/01/2017 02:26 PM     11/01/2017 02:26 PM    TSH 0.74 11/01/2017 04:25 AM     Lab Results   Component Value Date/Time    INR 1.1 11/01/2017 04:25 AM    aPTT 29.5 10/31/2017 03:45 PM    D-dimer 1.20 10/31/2017 03:45 PM    Fibrinogen 390 10/31/2017 08:47 PM     10/31/2017 Peripheral smear  PERIPHERAL SMEAR   (NOTE)   Comment:   Mild normocytic normochromic anemia. Mild leukocytosis with   neutrophilia.  Marked lymphopenia. Mild thrombocytopenia, few   platelet clumps noted. Slide reviewed by Dr. Kerry Jacobson on 11/1/17. OMN. 10/31/2017 XR CHEST   IMPRESSION: Emphysema. Hiatal hernia. CT would be more sensitive and specific  for evaluating for change and following the patient's known pulmonary nodule and  mediastinal mass. 10/31/2017 CT CHEST W CONT  IMPRESSION:  Stable mediastinal mass and right apical solitary 4 mm nodule. 3 new (compared to 2013) compression fractures as described, but age  indeterminate. Known osteoporosis. Incidental emphysema. 11/1/2017 CT ABD PELV W CONT  pending      Assessment and Recommendations: 1. Thrombocytopenia  New in the past year, with normal counts in 5/2016 at Rheumatology office. Uncertain etiology, but likely multifactorial, with bone marrow suppression from MTX and consumption related to ongoing bleeding. I have ordered some additional labs to evaluate for other possible etiologies. Hold MTX for now. Monitor CBC, transfuse PLT if <50 while actively bleeding. 2. Anemia, normocytic  Likely multifactorial, as above. Labs to further evaluate. Continue to monitor and transfuse for Hgb < 8 due to cardiac hx      3. Hematuria  Unclear etiology; chronic indwelling catheter. Urology following, and I discussed with Dr. Donna Padron this evening.   Thrombocytopenia may be exacerbating the hematuria, but I don't think it is the sole cause. Continue CBI per urology. CT planned for tonight. Urology considering possible cystoscopy. 4.Leukocytosis  Resolved today. Likely reactive due to UTI. On abx. 5.Afib  Currently on dilt gtt  Cardio following  ECHO pending  No anticoagulation in setting of hematuria    6. Mediastinal mass/ RUL (4 mm) nodule  Noted on CT Scan; appears chronic and stable    7. Compression fx (T7,T9 and L2)  Noted on CT scan; new since 2013    8. ? Pancreatic mass  CT abd/pelv pending    9. Hyponatremia  IVFs  Being managed by intensivist     10. Hx COPD   Pulmonary following    11. Hx Prostate Cancer  Follows with Dr Veronica Brown; receives Lupron injection based on PSA    12. RA  Follows with Dr Charmayne Mura; receives MTX every Wed; last dose 10/25/17  MTX now on hold due to plt count    13. Debility  PT/OT consulted      Patient seen in conjunction with Angie Amaral NP. I will follow along.       Signed By: Debbie Yadav MD     November 1, 2017

## 2017-11-01 NOTE — PROGRESS NOTES
Problem: Mobility Impaired (Adult and Pediatric)  Goal: *Acute Goals and Plan of Care (Insert Text)  Physical Therapy Goals  Initiated 11/1/2017  1. Patient will move from supine to sit and sit to supine , scoot up and down and roll side to side in bed with minimal assistance/contact guard assist within 7 day(s). 2.  Patient will transfer from bed to chair and chair to bed with minimal assistance/contact guard assist using the least restrictive device within 7 day(s). 3.  Patient will perform sit to stand with minimal assistance/contact guard assist within 7 day(s). physical Therapy EVALUATION  Patient: Suraj Jett (89 y.o. male)  Date: 11/1/2017  Primary Diagnosis: Gross hematuria        Precautions: fall, pressure injury, DNR, bruises easily, log roll      ASSESSMENT :  Based on the objective data described below, the pleasant patient presents without pain with P-Afib, v-tach and a tendency to tachycardia with activity (up to 129 bpm). Pt admitted for hematuria, but also found to have multiple compression fractures, had COPD exacerbation, and possible pancreatic mass. Pt has multiple ecchymoses and fragile skin with some drainage noted. Moore catheter with blood noted in urine. Wife present in room. Pt typically is bedbound and has been for about a year, but is able to transfer onto bedside commode with his wife's assistance. Pt fell a year ago and refused medical attention after an earlier unhappy prolonged hospitalization and rehab stay. Pt has very limited B shoulder range and strength. Pt sat edge of bed and transferred to upright chair with stand pivot and rolling walker and moderate assist x2. Pt takes short, shuffled steps, leans posteriorly, and is at high risk for falls. Pt did not desaturate with activity on supplemental O2 at 2,5 l/minute, but did appear short of breath and fatigued quickly.  Pt stated he felt weaker than usual.    Patient will benefit from skilled intervention to address the above impairments. Patients rehabilitation potential is considered to be Fair  Factors which may influence rehabilitation potential include:   []         None noted  []         Mental ability/status  [x]         Medical condition  []         Home/family situation and support systems  []         Safety awareness  []         Pain tolerance/management  []         Other:      PLAN :  Recommendations and Planned Interventions:  [x]           Bed Mobility Training             [x]    Neuromuscular Re-Education  [x]           Transfer Training                   []    Orthotic/Prosthetic Training  []           Gait Training                         []    Modalities  [x]           Therapeutic Exercises           []    Edema Management/Control  [x]           Therapeutic Activities            [x]    Patient and Family Training/Education  []           Other (comment):    Frequency/Duration: Patient will be followed by physical therapy  5 times a week to address goals. Discharge Recommendations: Home Health  Further Equipment Recommendations for Discharge: none     SUBJECTIVE:   Patient stated I am happy to with at therapy.     OBJECTIVE DATA SUMMARY:   HISTORY:    Past Medical History:   Diagnosis Date    COPD (chronic obstructive pulmonary disease) ([facility] [address]     on home oxygen at 2.5L    HTN (hypertension)     Mediastinal mass     Paroxysmal atrial fibrillation (HCC) 11/1/2017    [facility]     Pulmonary nodule     Rheumatoid arthritis (HCC)     followed by Dr. Heron Mcclendon   No past surgical history on file. Prior Level of Function/Home Situation: Pt was bedbound, needed assist for ADL's, took sponge baths, and has not fallen in the past year.   Personal factors and/or comorbidities impacting plan of care: HTN, COPD, RA, multiple compression fractures    Home Situation  Home Environment: Private residence  # Steps to Enter: 2  Rails to Enter: Yes  Hand Rails : Bilateral  Wheelchair Ramp: Yes  One/Two Story Residence: Two story, live on 1st floor  # of Interior Steps:  (pt. lives in lower level)  Living Alone: No  Support Systems: Spouse/Significant Other/Partner  Patient Expects to be Discharged to[de-identified] Private residence  Current DME Used/Available at Home: Wheelchair, Oxygen, portable, Hospital bed, Lift chair, Walker, rolling  Tub or Shower Type:  (takes sponge baths)    EXAMINATION/PRESENTATION/DECISION MAKING:   Critical Behavior:  Neurologic State: Alert  Orientation Level: Oriented X4  Cognition: Appropriate decision making, Appropriate for age attention/concentration, Appropriate safety awareness, Follows commands     Hearing: Auditory  Auditory Impairment: None  Skin:  Multiple ecchymoses  Edema: not noted  Range Of Motion:  AROM: Generally decreased, functional (B shoulders very limited)           PROM: Generally decreased, functional           Strength:    Strength: Generally decreased, functional (B shoulders very limited; R LE weaker than L)                    Tone & Sensation:   Tone: Normal              Sensation: Impaired (dulled B feet)               Coordination:  Coordination: Generally decreased, functional  Vision:      Functional Mobility:  Bed Mobility:     Supine to Sit: Additional time;Assist x2; Moderate assistance  Sit to Supine: Additional time;Assist x2; Moderate assistance  Scooting: Assist x2; Additional time; Moderate assistance  Transfers:  Sit to Stand: Assist x2; Additional time; Moderate assistance  Stand to Sit: Assist x2; Additional time; Moderate assistance  Stand Pivot Transfers: Assist x2     Bed to Chair: Moderate assistance; Additional time              Balance:   Sitting: High guard  Standing: Impaired  Standing - Static: Constant support  Standing - Dynamic : Poor  Ambulation/Gait Training:  Distance (ft): 2 Feet (ft)  Assistive Device: Walker;Gait belt  Ambulation - Level of Assistance: Assist x2; Moderate assistance;Maximum assistance     Gait Description (WDL): Exceptions to WDL  Gait Abnormalities: Decreased step clearance;Shuffling gait        Base of Support: Widened     Speed/Janey: Slow;Shuffled  Step Length: Left shortened;Right shortened                     Stairs: Therapeutic Exercises:   Instructed in ankle pumps and heel glides and encouraged to exercise hourly    Functional Measure:  Barthel Index:    Bathin  Bladder: 0  Bowels: 10  Groomin  Dressin  Feedin  Mobility: 0  Stairs: 0  Toilet Use: 5  Transfer (Bed to Chair and Back): 5  Total: 35       Barthel and G-code impairment scale:  Percentage of impairment CH  0% CI  1-19% CJ  20-39% CK  40-59% CL  60-79% CM  80-99% CN  100%   Barthel Score 0-100 100 99-80 79-60 59-40 20-39 1-19   0   Barthel Score 0-20 20 17-19 13-16 9-12 5-8 1-4 0      The Barthel ADL Index: Guidelines  1. The index should be used as a record of what a patient does, not as a record of what a patient could do. 2. The main aim is to establish degree of independence from any help, physical or verbal, however minor and for whatever reason. 3. The need for supervision renders the patient not independent. 4. A patient's performance should be established using the best available evidence. Asking the patient, friends/relatives and nurses are the usual sources, but direct observation and common sense are also important. However direct testing is not needed. 5. Usually the patient's performance over the preceding 24-48 hours is important, but occasionally longer periods will be relevant. 6. Middle categories imply that the patient supplies over 50 per cent of the effort. 7. Use of aids to be independent is allowed. Alexandra Sales., Barthel, D.W. (1601). Functional evaluation: the Barthel Index. [address] [facility] (14)2. Justin Shelby, GILSONF, Panda Nuñez., Micaela, [address] [facility] ().  Measuring the change indisability after inpatient rehabilitation; comparison of the responsiveness of the Barthel Index and Functional [place] Measure. Journal of Neurology, Neurosurgery, and Psychiatry, 66(4), 239-422. SedrickFRANSISCO Szymanski, PHAM Zepeda, & Ritesh Rollins M.A. (2004.) Assessment of post-stroke quality of life in cost-effectiveness studies: The usefulness of the Barthel Index and the EuroQoL-5D. Quality of Life Research, 13, 433-98       G codes: In compliance with CMSs Claims Based Outcome Reporting, the following G-code set was chosen for this patient based on their primary functional limitation being treated: The outcome measure chosen to determine the severity of the functional limitation was the Barthel Index with a score of 35/100 which was correlated with the impairment scale. ? Mobility - Walking and Moving Around:     - CURRENT STATUS: CL - 60%-79% impaired, limited or restricted    - GOAL STATUS: CK - 40%-59% impaired, limited or restricted    - D/C STATUS:  ---------------To be determined---------------      Physical Therapy Evaluation Charge Determination   History Examination Presentation Decision-Making   HIGH Complexity :3+ comorbidities / personal factors will impact the outcome/ POC  LOW Complexity : 1-2 Standardized tests and measures addressing body structure, function, activity limitation and / or participation in recreation  LOW Complexity : Stable, uncomplicated  Other outcome measures Barthel Index  HIGH       Based on the above components, the patient evaluation is determined to be of the following complexity level: LOW     Pain:  Pain Scale 1: Numeric (0 - 10)  Pain Intensity 1: 0              Activity Tolerance:   fair  Please refer to the flowsheet for vital signs taken during this treatment.   After treatment:   []         Patient left in no apparent distress sitting up in chair  [x]         Patient left in no apparent distress in bed  [x]         Call bell left within reach  [x]         Nursing notified  [x]         Caregiver present  []         Bed alarm activated    COMMUNICATION/EDUCATION:   The patients plan of care was discussed with: Registered Nurse. [x]         Fall prevention education was provided and the patient/caregiver indicated understanding. [x]         Patient/family have participated as able in goal setting and plan of care. [x]         Patient/family agree to work toward stated goals and plan of care. []         Patient understands intent and goals of therapy, but is neutral about his/her participation. []         Patient is unable to participate in goal setting and plan of care.     Thank you for this referral.  Priscilla Crespo, PT   Time Calculation: 25 mins

## 2017-11-01 NOTE — PROGRESS NOTES
Problem: Self Care Deficits Care Plan (Adult)  Goal: *Acute Goals and Plan of Care (Insert Text)  Occupational Therapy Goals  Initiated 11/1/2017  1. Patient will perform grooming with supervision/set-up from supported sitting position within 7 day(s). 2.  Patient will perform upper body dressing and dressing with moderate assistance  within 7 day(s). 3.  Patient will perform lower body dressing and bathing with maximal assistance within 7 day(s). 4.  Patient will perform toilet transfers with moderate assistance to [facility] utilizing best technique within 7 day(s). 5.  Patient will perform all aspects of toileting with supervision/set-up within 7 day(s). 6.  Patient will participate in upper extremity therapeutic exercise/activities with supervision/set-up for 8 minutes within 7 day(s). 7.  Patient will utilize energy conservation techniques during functional activities with verbal cues within 7 day(s). Occupational Therapy EVALUATION  Patient: Austin Bowers (83 y.o. male)  Date: 11/1/2017  Primary Diagnosis: Gross hematuria        Precautions: fall       ASSESSMENT :  Based on the objective data described below, the patient presents with decreased activity tolerance following admission on 10/31 for hematuria. Patient was alert, oriented x3 (unable to recall date), and agreeable to activity. Patient presents with mild confusion and admits to premorbid memory impairments. Patient reports he lives with his wife, has been mostly bed bound for ~1 year but does transfer, very short distances, to bathroom or to a chair when necessary. Additionally, patient reports he is able to feed himself but otherwise needs assistance for all ADLs; Unable to clarify amount of assist needed but states, \"my wife has to help with everything\". Today, patient is primarily limited by general weakness, minimal shoulder ROM, poor endurance, slow processing + sequencing, and elevated HR with activity (up to 120s).   Patient required mod to max A x2 for bed mobility. He requires near constant support when sitting unsupported with only very brief periods of controlled midline positioning. Patient returned to bed and bed placed in chair position for supported sitting activity. Patient currently requires supervision/setup for feeding, max A for all other UB ADLs, and total A for LB ADLs. Patient would benefit from continued skilled OT to progress towards goals and improve overall independence. Patient will benefit from skilled intervention to address the above impairments. Patients rehabilitation potential is considered to be Fair  Factors which may influence rehabilitation potential include:   [x]             None noted  []             Mental ability/status  []             Medical condition  []             Home/family situation and support systems  []             Safety awareness  []             Pain tolerance/management  []             Other:      PLAN :  Recommendations and Planned Interventions:  [x]               Self Care Training                  [x]        Therapeutic Activities  [x]               Functional Mobility Training    []        Cognitive Retraining  [x]               Therapeutic Exercises           [x]        Endurance Activities  []               Balance Training                   []        Neuromuscular Re-Education  []               Visual/Perceptual Training     [x]   Home Safety Training  [x]               Patient Education                 [x]        Family Training/Education  []               Other (comment):    Frequency/Duration: Patient will be followed by occupational therapy 3 times a week to address goals. Discharge Recommendations: Home Health; Patient is adamant about returning home at discharge- will need 24/7 assist and [facility] services  Further Equipment Recommendations for Discharge: none      SUBJECTIVE:   Patient stated I like sitting up like this; I feel better like this.  re: bed in chair position    OBJECTIVE DATA SUMMARY:   HISTORY:   Past Medical History:   Diagnosis Date    COPD (chronic obstructive pulmonary disease) ([facility]     on home oxygen at 2.5L    HTN (hypertension)     Mediastinal mass     Paroxysmal atrial fibrillation (HCC) 11/1/2017    Prostate cancer ([facility]     Pulmonary nodule     Rheumatoid arthritis (HCC)     followed by Dr. Herman Hinson   No past surgical history on file. Prior Level of Function/Home Situation: Patient lives with his wife. See assessment section for PLOF information provided by pt. Will need to confirm with wife as patient with mild confusion this afternoon. Home Situation  Home Environment: Private residence  # Steps to Enter: 2  Rails to Enter: Yes  Hand Rails : Bilateral  Wheelchair Ramp: Yes  One/Two Story Residence: Two story, live on 1st floor  # of Interior Steps:  (pt. lives in lower level)  Living Alone: No  Support Systems: Spouse/Significant Other/Partner  Patient Expects to be Discharged to[de-identified] Private residence  Current DME Used/Available at Home: Wheelchair, Oxygen, portable, Hospital bed, Lift chair, Walker, rolling  Tub or Shower Type:  (bed baths only with assist from wife)  [x]  Right hand dominant   []  Left hand dominant    EXAMINATION OF PERFORMANCE DEFICITS:  Cognitive/Behavioral Status:  Neurologic State: Alert  Orientation Level: Oriented X4  Cognition: Appropriate decision making; Appropriate for age attention/concentration; Appropriate safety awareness; Follows commands  Perception: Appears intact  Perseveration: No perseveration noted  Safety/Judgement: Awareness of environment    Skin: Multiple skin tears and abrasions noted throughout B uppers; RN aware and managing as able    Edema:None noted in the uppers     Hearing:   Auditory  Auditory Impairment: None    Range of Motion:  B shoulder ROM limited to ~ 30 degrees of flexion AROM and ~50 degrees of flexion PROM; otherwise WDL    Strength:  B shoulders, Grossly decreased non functional; otherwise Decreased but functional in the uppers    Coordination:  Fine Motor Skills-Upper: Left Intact; Right Intact    Gross Motor Skills-Upper: Left Intact; Right Intact    Tone & Sensation:  Tone: Normal  Sensation: Impaired (dulled B feet)    Balance:  Sitting: Impaired; Fair to poor static and dynamic; High guard    Functional Mobility and Transfers for ADLs:  Bed Mobility:  Supine to Sit: Additional time;Assist x2; Moderate assistance  Sit to Supine: Additional time;Assist x2; Moderate assistance  Scooting: Assist x2; Additional time; Moderate assistance    Transfers:   Did not attempt OOB/ADL transfers d/t increased HR, fatigue, and patient declined   ADL Assessment:  Feeding: Supervision;Setup    Oral Facial Hygiene/Grooming: Maximum assistance    Bathing: Total assistance    Upper Body Dressing: Maximum assistance    Lower Body Dressing: Total assistance    Toileting: Total assistance    Cognitive Retraining  Safety/Judgement: Awareness of environment    Functional Measure:  Barthel Index:    Bathin  Bladder: 0  Bowels: 5  Groomin  Dressin  Feedin  Mobility: 0  Stairs: 0  Toilet Use: 0  Transfer (Bed to Chair and Back): 5  Total: 15       Barthel and G-code impairment scale:  Percentage of impairment CH  0% CI  1-19% CJ  20-39% CK  40-59% CL  60-79% CM  80-99% CN  100%   Barthel Score 0-100 100 99-80 79-60 59-40 20-39 1-19   0   Barthel Score 0-20 20 17-19 13-16 9-12 5-8 1-4 0      The Barthel ADL Index: Guidelines  1. The index should be used as a record of what a patient does, not as a record of what a patient could do. 2. The main aim is to establish degree of independence from any help, physical or verbal, however minor and for whatever reason. 3. The need for supervision renders the patient not independent. 4. A patient's performance should be established using the best available evidence.  Asking the patient, friends/relatives and nurses are the usual sources, but direct observation and common sense are also important. However direct testing is not needed. 5. Usually the patient's performance over the preceding 24-48 hours is important, but occasionally longer periods will be relevant. 6. Middle categories imply that the patient supplies over 50 per cent of the effort. 7. Use of aids to be independent is allowed. Paul Huitron., Barthel, D.W. (2045). Functional evaluation: the Barthel Index. [address] (14)2. Sandria Cogan cachorro VIOLA [place], Shirley Amin, Josselin Larsen, [place], [facility] (1999). Measuring the change indisability after inpatient rehabilitation; comparison of the responsiveness of the Barthel Index and Functional [place] Measure. Journal of Neurology, Neurosurgery, and Psychiatry, 66(4), 271-888. FRANSISCO Larsen, PHAM Zepeda, & Yang Israel MIvoneA. (2004.) Assessment of post-stroke quality of life in cost-effectiveness studies: The usefulness of the Barthel Index and the EuroQoL-5D. Quality of Life Research, 13, 523-58       G codes: In compliance with CMSs Claims Based Outcome Reporting, the following G-code set was chosen for this patient based on their primary functional limitation being treated: The outcome measure chosen to determine the severity of the functional limitation was the Barthel Index with a score of 15/100 which was correlated with the impairment scale. ?  Self Care:     - CURRENT STATUS: CM - 80%-99% impaired, limited or restricted    - GOAL STATUS: CL - 60%-79% impaired, limited or restricted    - D/C STATUS:  ---------------To be determined---------------     Occupational Therapy Evaluation Charge Determination   History Examination Decision-Making   LOW Complexity : Brief history review  MEDIUM Complexity : 3-5 performance deficits relating to physical, cognitive , or psychosocial skils that result in activity limitations and / or participation restrictions MEDIUM Complexity : Patient may present with comorbidities that affect occupational performnce. Miniml to moderate modification of tasks or assistance (eg, physical or verbal ) with assesment(s) is necessary to enable patient to complete evaluation       Based on the above components, the patient evaluation is determined to be of the following complexity level: LOW   Pain:  Pain Scale 1: Numeric (0 - 10)  Pain Intensity 1: 0              Activity Tolerance:   Patient with fair activity tolerance. Please refer to the flowsheet for vital signs taken during this treatment. After treatment:   [] Patient left in no apparent distress sitting up in chair  [x] Patient left in no apparent distress in bed in chair position  [x] Call bell left within reach  [x] Nursing notified  [] Caregiver present  [x] Bed alarm activated    COMMUNICATION/EDUCATION:   The patients plan of care was discussed with:  Registered Nurse and patient. [x] Home safety education was provided and the patient/caregiver indicated understanding. [x] Patient/family have participated as able in goal setting and plan of care. [x] Patient/family agree to work toward stated goals and plan of care. [] Patient understands intent and goals of therapy, but is neutral about his/her participation. [] Patient is unable to participate in goal setting and plan of care. This patients plan of care is appropriate for delegation to [facility].     Thank you for this referral.  Lenard Jaime, OTR/L  Time Calculation: 36 mins

## 2017-11-01 NOTE — PROGRESS NOTES
22:45 Admitted this 79 y/o male pt. From ED accompanied by RN and family. I trace done. Bladder irrigation in progress, output was pink in color. IVF NS infusing well at 75 ml/hr. Assessment done as documented. Instructed to bring copy of advance directives. 23:15 Seen by Dr. Nuha Meyers, manual irrigation done, clots was seen, irrigant drainage much clearer. Admission history taken from patient, his daughter and son in law. 23:55 Blood sample sent to lab.  00:10 Due med given. Pt. Removed his dentures, soaked in warm water. 00:29 Pt. 13 beat run of VT, called to Dr. Iliana Jett, order to include Mg in am labs and continue to monitor. 01:00 Sleeping. Pt. Complaint that room was hot, room temperature now at 55, covers removed. 01:45 Pt. Was dreaming asking for official score of sports game on tv he recently watch. 02:00 Pt. Window crack-open, with permission from nursing supervisor, trying to find desk fan per pt. Request.  04:25 Blood specimen sent. Reassessed. 04:30 A fib RVR -160's sustained. Talked to Dr. Iliana Jett, ordered to start  ml iv bolus before starting cardizem bolus/drip.   05:00 Seen by Dr. Iliana Jett pt. HR converted to low 100's, BP stable, cardizem drip on hold. 05:30 Pt. In rapid rate 150 sustained, called Dr. Iliana Jett, order to given early dose of metoprolol xl then observed for 30 minutes - 1 hour. 05:32 Metoprolol po given. 06:35 Pt. Still in 150's Afib. Talked to Dr. Iliana Jett, order to start iv cardizem start at 5 mg/hr. 06:40 Pt. HR in 90's. 06:50 Rapid Afib 170's and 160's cardizem drip started at 5 mg/hr  07:30 Bedside report given to [facility] and student.  Pt. HR 80's-90's, no complaints made

## 2017-11-01 NOTE — PROGRESS NOTES
Bedside and Verbal shift change report given to Pina Singletary RN (oncoming nurse) by Amari Naqvi RN (offgoing nurse). Report included the following information SBAR, Kardex, Procedure Summary, Intake/Output, MAR and Recent Results. Pt off the floor to CT.  2000 Pt back from CT. Pt has continuous bladder irrigation. Pt was on cardizem earlier today off at present HR afib 96. Bladder irrigation clamped unclamped small clots noted. 2500 cc in bag adjusted to drain clear red drainage. [place] results to Dr. Huyen Calabrese. MD stated that Urologist will review in the AM.  No new orders. 0100 Pt complaining of pain irrigation not draining. Unable to pull back a clot. 500 cc red urine drained from patients bladder using irrigation port. This immediately relieved patients discomfort. Unable to drain out of drainage port on 3 way after 180 ml flush with irrigation syringe. Stopped irrigation. Paged Dr. Klarissa Li and updated him on situation. Dr. Jania Iqbal to come in would prefer that patient had a number 24 2-way drainage jha. Urology cart and irrigation at bedside awaiting physician arrival.  Will continue to monitor. 0300 Dr. Cornell Salazar came in to see patient and correct clotted catheter. See MD note. Running irrigation quickly. Notified Nursing Supervisor in reference to getting more irrigation bags to bedside. Urine is light pink no clots noted. Will continue to monitor. Pt HR elevated then sustained in the 170's during procedure. Started Cardizem drip. Called Dr. Huyen Calabrese and made him aware of the situation. No new orders.

## 2017-11-01 NOTE — PROGRESS NOTES
Bedside and Verbal shift change report given to [place] (oncoming nurse) by Leidy Orosco (offgoing nurse). Report included the following information SBAR, Kardex, ED Summary, Procedure Summary, Intake/Output, MAR, Recent Results and Cardiac Rhythm afib. Dr Michaela Rowland notified of pt taking theophyline & duonebs, pt was in afib RVR this am. MD will adjust meds, RT called to hold off on nebs until adjustment. Per MD hold off on UA until pt no longer on bladder irrigation. Orders for cardiac rehab consult, echo, CT abdomen. 0800 Frantz Mccullough at bedside    [address] on floor, given pt update, order for STAT EKG    0830 Dr Michaela Rowland at bedside    0940 Dr Jessica Ramos at bedside    9107 new order for PTOT, Incentive spirometry, nebs changed to [address]    1000 both IV infiltrated, removed, US at bedside to place another. Order for add on trop, Dr Jona Gomez given phone update with pt permission, Per MD recs urology paged re: pt bleeding. 1030 per Dr Amy Watson ok to take theophylline. Dr Carie Kline urology given update re: bleeding, defers to oncology at this time, needs CT    [address] from dietary at bedside    1125 echo at bedside    [address] from hem/onc given update re: call with Dr Carie Kline, active urinary bleeding, platelet need per Dr Carie Kline. Order for am labs. Order for CBC q6, call MD if HGB <8 or plt <50 Per Madyson Card ok for pt to work with PTOT. CT will be performed this evening due to pt receiving contrast 2000 last pm.    1219 Ann Sch[address] at bedside. Phlebotomy notified to draw [address] notified that pt last dose methotrexate last wed, pt sees Dr Bob Lopez for RA. Madyson Card will add additional labs for reticulocyte count, haptoglobin, LD,ferritin, Iron profile, protein electropheresis, folate, B12, hepatitis C AB, phlebotomy notified. [address] notified of STAT CBC results, she will round on pt to get consent for medical release records.  Next CBC due 1900    1329 trop neg, Nick Ryder notified    [phone] from PT at bedside    Bedside and Verbal shift change report given to Jennifer Obrien (oncoming nurse) by Niki (offgoing nurse). Report included the following information SBAR, Kardex, ED Summary, Procedure Summary, Intake/Output, MAR, Recent Results and Cardiac Rhythm afib. Rn aware of next CBC due 1900, CT tonight 2000, call for abnormal lab values.  RN aware of need for new IV access/

## 2017-11-02 LAB
ALBUMIN SERPL-MCNC: 2.8 G/DL (ref 3.5–5)
ALBUMIN/GLOB SERPL: 1.2 {RATIO} (ref 1.1–2.2)
ALP SERPL-CCNC: 55 U/L (ref 45–117)
ALT SERPL-CCNC: 26 U/L (ref 12–78)
ANION GAP SERPL CALC-SCNC: 8 MMOL/L (ref 5–15)
AST SERPL-CCNC: 56 U/L (ref 15–37)
BILIRUB DIRECT SERPL-MCNC: 0.1 MG/DL (ref 0–0.2)
BILIRUB SERPL-MCNC: 0.3 MG/DL (ref 0.2–1)
BUN SERPL-MCNC: 21 MG/DL (ref 6–20)
BUN/CREAT SERPL: 22 (ref 12–20)
CALCIUM SERPL-MCNC: 9.1 MG/DL (ref 8.5–10.1)
CHLORIDE SERPL-SCNC: 95 MMOL/L (ref 97–108)
CO2 SERPL-SCNC: 27 MMOL/L (ref 21–32)
CREAT SERPL-MCNC: 0.96 MG/DL (ref 0.7–1.3)
ERYTHROCYTE [DISTWIDTH] IN BLOOD BY AUTOMATED COUNT: 14 % (ref 11.5–14.5)
FSP PPP LA-ACNC: <5 UG/ML
GLOBULIN SER CALC-MCNC: 2.4 G/DL (ref 2–4)
GLUCOSE SERPL-MCNC: 157 MG/DL (ref 65–100)
HCT VFR BLD AUTO: 23.5 % (ref 36.6–50.3)
HGB BLD-MCNC: 8.1 G/DL (ref 12.1–17)
MAGNESIUM SERPL-MCNC: 1.8 MG/DL (ref 1.6–2.4)
MCH RBC QN AUTO: 29.5 PG (ref 26–34)
MCHC RBC AUTO-ENTMCNC: 34.5 G/DL (ref 30–36.5)
MCV RBC AUTO: 85.5 FL (ref 80–99)
PHOSPHATE SERPL-MCNC: 3 MG/DL (ref 2.6–4.7)
PLATELET # BLD AUTO: 136 K/UL (ref 150–400)
POTASSIUM SERPL-SCNC: 4 MMOL/L (ref 3.5–5.1)
PROT SERPL-MCNC: 5.2 G/DL (ref 6.4–8.2)
RBC # BLD AUTO: 2.75 M/UL (ref 4.1–5.7)
SODIUM SERPL-SCNC: 130 MMOL/L (ref 136–145)
WBC # BLD AUTO: 20.5 K/UL (ref 4.1–11.1)

## 2017-11-02 PROCEDURE — 74011000258 HC RX REV CODE- 258: Performed by: INTERNAL MEDICINE

## 2017-11-02 PROCEDURE — 74011250636 HC RX REV CODE- 250/636: Performed by: INTERNAL MEDICINE

## 2017-11-02 PROCEDURE — 74011000250 HC RX REV CODE- 250: Performed by: INTERNAL MEDICINE

## 2017-11-02 PROCEDURE — 80076 HEPATIC FUNCTION PANEL: CPT | Performed by: INTERNAL MEDICINE

## 2017-11-02 PROCEDURE — 80048 BASIC METABOLIC PNL TOTAL CA: CPT | Performed by: INTERNAL MEDICINE

## 2017-11-02 PROCEDURE — 65660000000 HC RM CCU STEPDOWN

## 2017-11-02 PROCEDURE — 85027 COMPLETE CBC AUTOMATED: CPT | Performed by: INTERNAL MEDICINE

## 2017-11-02 PROCEDURE — 74011250637 HC RX REV CODE- 250/637: Performed by: INTERNAL MEDICINE

## 2017-11-02 PROCEDURE — 74011250637 HC RX REV CODE- 250/637: Performed by: NURSE PRACTITIONER

## 2017-11-02 PROCEDURE — 74011000250 HC RX REV CODE- 250: Performed by: PHYSICIAN ASSISTANT

## 2017-11-02 PROCEDURE — 74011000250 HC RX REV CODE- 250

## 2017-11-02 PROCEDURE — 77010033678 HC OXYGEN DAILY

## 2017-11-02 PROCEDURE — 36415 COLL VENOUS BLD VENIPUNCTURE: CPT | Performed by: INTERNAL MEDICINE

## 2017-11-02 PROCEDURE — 94640 AIRWAY INHALATION TREATMENT: CPT

## 2017-11-02 PROCEDURE — 84100 ASSAY OF PHOSPHORUS: CPT | Performed by: INTERNAL MEDICINE

## 2017-11-02 PROCEDURE — 74011636637 HC RX REV CODE- 636/637: Performed by: INTERNAL MEDICINE

## 2017-11-02 PROCEDURE — 83735 ASSAY OF MAGNESIUM: CPT | Performed by: INTERNAL MEDICINE

## 2017-11-02 RX ORDER — DILTIAZEM HYDROCHLORIDE 30 MG/1
30 TABLET, FILM COATED ORAL
Status: DISCONTINUED | OUTPATIENT
Start: 2017-11-02 | End: 2017-11-03

## 2017-11-02 RX ORDER — PREDNISONE 20 MG/1
40 TABLET ORAL
Status: DISCONTINUED | OUTPATIENT
Start: 2017-11-02 | End: 2017-11-03

## 2017-11-02 RX ORDER — LIDOCAINE HYDROCHLORIDE 20 MG/ML
JELLY TOPICAL AS NEEDED
Status: DISCONTINUED | OUTPATIENT
Start: 2017-11-02 | End: 2017-11-08 | Stop reason: HOSPADM

## 2017-11-02 RX ORDER — LIDOCAINE HYDROCHLORIDE 20 MG/ML
JELLY TOPICAL
Status: COMPLETED
Start: 2017-11-02 | End: 2017-11-02

## 2017-11-02 RX ADMIN — Medication 10 ML: at 21:24

## 2017-11-02 RX ADMIN — IPRATROPIUM BROMIDE AND ALBUTEROL SULFATE 3 ML: .5; 3 SOLUTION RESPIRATORY (INHALATION) at 13:47

## 2017-11-02 RX ADMIN — PANTOPRAZOLE SODIUM 40 MG: 40 TABLET, DELAYED RELEASE ORAL at 09:04

## 2017-11-02 RX ADMIN — ARFORMOTEROL TARTRATE 15 MCG: 15 SOLUTION RESPIRATORY (INHALATION) at 08:16

## 2017-11-02 RX ADMIN — BUSPIRONE HYDROCHLORIDE 5 MG: 5 TABLET ORAL at 21:24

## 2017-11-02 RX ADMIN — BUDESONIDE 500 MCG: 0.5 INHALANT RESPIRATORY (INHALATION) at 20:25

## 2017-11-02 RX ADMIN — DILTIAZEM HYDROCHLORIDE 10 MG/HR: 5 INJECTION INTRAVENOUS at 04:58

## 2017-11-02 RX ADMIN — Medication 10 ML: at 15:35

## 2017-11-02 RX ADMIN — FOLIC ACID 1 MG: 1 TABLET ORAL at 18:35

## 2017-11-02 RX ADMIN — IPRATROPIUM BROMIDE AND ALBUTEROL SULFATE 3 ML: .5; 3 SOLUTION RESPIRATORY (INHALATION) at 20:25

## 2017-11-02 RX ADMIN — BUDESONIDE 500 MCG: 0.5 INHALANT RESPIRATORY (INHALATION) at 08:16

## 2017-11-02 RX ADMIN — PREDNISONE 40 MG: 20 TABLET ORAL at 09:04

## 2017-11-02 RX ADMIN — DILTIAZEM HYDROCHLORIDE 30 MG: 30 TABLET, FILM COATED ORAL at 15:35

## 2017-11-02 RX ADMIN — MONTELUKAST SODIUM 10 MG: 10 TABLET, FILM COATED ORAL at 18:35

## 2017-11-02 RX ADMIN — ARFORMOTEROL TARTRATE 15 MCG: 15 SOLUTION RESPIRATORY (INHALATION) at 20:25

## 2017-11-02 RX ADMIN — SODIUM CHLORIDE 75 ML/HR: 900 INJECTION, SOLUTION INTRAVENOUS at 06:03

## 2017-11-02 RX ADMIN — METHYLPREDNISOLONE SODIUM SUCCINATE 60 MG: 125 INJECTION, POWDER, FOR SOLUTION INTRAMUSCULAR; INTRAVENOUS at 06:02

## 2017-11-02 RX ADMIN — LIDOCAINE HYDROCHLORIDE: 20 JELLY TOPICAL at 05:06

## 2017-11-02 RX ADMIN — IPRATROPIUM BROMIDE AND ALBUTEROL SULFATE 3 ML: .5; 3 SOLUTION RESPIRATORY (INHALATION) at 08:16

## 2017-11-02 RX ADMIN — BUSPIRONE HYDROCHLORIDE 5 MG: 5 TABLET ORAL at 09:04

## 2017-11-02 RX ADMIN — CEFTRIAXONE SODIUM 1 G: 1 INJECTION, POWDER, FOR SOLUTION INTRAMUSCULAR; INTRAVENOUS at 20:23

## 2017-11-02 RX ADMIN — METOPROLOL SUCCINATE 25 MG: 25 TABLET, FILM COATED, EXTENDED RELEASE ORAL at 09:04

## 2017-11-02 RX ADMIN — DILTIAZEM HYDROCHLORIDE 30 MG: 30 TABLET, FILM COATED ORAL at 10:00

## 2017-11-02 RX ADMIN — IPRATROPIUM BROMIDE AND ALBUTEROL SULFATE 3 ML: .5; 3 SOLUTION RESPIRATORY (INHALATION) at 02:03

## 2017-11-02 RX ADMIN — THEOPHYLLINE ANHYDROUS 200 MG: 200 CAPSULE, EXTENDED RELEASE ORAL at 09:04

## 2017-11-02 RX ADMIN — THEOPHYLLINE ANHYDROUS 200 MG: 200 CAPSULE, EXTENDED RELEASE ORAL at 21:24

## 2017-11-02 NOTE — PROGRESS NOTES
Went by to check on patient. His urine remains pink tinged on a slow drip. Will recheck later this morning.

## 2017-11-02 NOTE — PROGRESS NOTES
[facility]  Medical Oncology at [facility]  [phone]    Hematology / Oncology Follow-up    Reason for Visit:   Suraj Jett is a 80 y.o. male who is seen for follow-up of thrombocytopenia, hematuria. Interval History:   He had his CBI stopped last night for his CT scan, and this clotted up, requiring urology to come in the middle of the night to address this. Currently, he reports feeling a fair bit better. Hematuria persists, no clots noted in bag. Fatigued after staying up most of the night. Medications reviewed in the EMR. No Known Allergies     Review of Systems: A 6-point review of systems was obtained, negative except as reviewed in the HPI. Physical Exam:     Visit Vitals    BP (!) 119/105    Pulse 95    Temp 98 °F (36.7 °C)    Resp 21    Ht 5' 10\" (1.778 m)    Wt 150 lb 12.7 oz (68.4 kg)    SpO2 96%    BMI 21.64 kg/m2     General: No distress, elderly and frail appearing  Eyes: Anicteric sclerae  HENT: Atraumatic  Neck: Supple  Respiratory: Normal respiratory effort, on O2 by nc  CV: No peripheral edema  GI: Soft, nontender, nondistended, no masses, no hepatomegaly, no splenomegaly  : jha catheter with CBI, hematuria noted  Skin: ecchymoses bilateral upper extremity; scattered LE; no rash or petechiae. Normal temperature, fair turgor, and texture. Psych: Alert, oriented, appropriate affect, normal judgment/insight      Results:     Lab Results   Component Value Date/Time    WBC 20.5 11/02/2017 05:43 AM    HGB 8.1 11/02/2017 05:43 AM    HCT 23.5 11/02/2017 05:43 AM    PLATELET 484 16/17/5930 05:43 AM    MCV 85.5 11/02/2017 05:43 AM    ABS.  NEUTROPHILS 9.2 11/01/2017 12:49 PM     Lab Results   Component Value Date/Time    Sodium 130 11/02/2017 05:43 AM    Potassium 4.0 11/02/2017 05:43 AM    Chloride 95 11/02/2017 05:43 AM    CO2 27 11/02/2017 05:43 AM    Glucose 157 11/02/2017 05:43 AM    BUN 21 11/02/2017 05:43 AM    Creatinine 0.96 11/02/2017 05:43 AM GFR est AA >60 11/02/2017 05:43 AM    GFR est non-AA >60 11/02/2017 05:43 AM    Calcium 9.1 11/02/2017 05:43 AM    Creatinine (POC) 0.9 06/26/2013 12:04 PM     Lab Results   Component Value Date/Time    Bilirubin, total 0.3 11/02/2017 05:43 AM    ALT (SGPT) 26 11/02/2017 05:43 AM    AST (SGOT) 56 11/02/2017 05:43 AM    Alk. phosphatase 55 11/02/2017 05:43 AM    Protein, total 5.2 11/02/2017 05:43 AM    Albumin 2.8 11/02/2017 05:43 AM    Globulin 2.4 11/02/2017 05:43 AM     Lab Results   Component Value Date/Time    Reticulocyte count 2.2 11/01/2017 12:49 PM    Iron % saturation 15 11/01/2017 02:26 PM    TIBC 219 11/01/2017 02:26 PM    Ferritin 585 11/01/2017 02:26 PM    Vitamin B12 671 11/01/2017 02:26 PM    Folate 17.5 11/01/2017 02:26 PM    Haptoglobin 152 11/01/2017 02:26 PM     11/01/2017 02:26 PM    TSH 0.74 11/01/2017 04:25 AM     Lab Results   Component Value Date/Time    INR 1.1 11/01/2017 04:25 AM    aPTT 29.5 10/31/2017 03:45 PM    D-dimer 1.20 10/31/2017 03:45 PM    Fibrinogen 390 10/31/2017 08:47 PM       10/31/2017 Peripheral smear  PERIPHERAL SMEAR   (NOTE)   Comment:   Mild normocytic normochromic anemia. Mild leukocytosis with   neutrophilia.  Marked lymphopenia. Mild thrombocytopenia, few   platelet clumps noted. Slide reviewed by Dr. Gilma Day on 11/1/17. OMN. 10/31/2017 XR CHEST   IMPRESSION: Emphysema. Hiatal hernia. CT would be more sensitive and specific  for evaluating for change and following the patient's known pulmonary nodule and  mediastinal mass. 10/31/2017 CT CHEST W CONT  IMPRESSION:  Stable mediastinal mass and right apical solitary 4 mm nodule. 3 new (compared to 2013) compression fractures as described, but age  indeterminate. Known osteoporosis. Incidental emphysema. 11/1/2017 CT ABD PELV W CONT  1. Questionable papillary mass at the right ureteral orifice.   2. Mild left hydronephrosis and delayed excretion may be due to the proximity of  the Moore catheter balloon to the left ureterovesical junction. 3. Emphysema. 4. Osteoporosis. Age-indeterminate compression fractures L2-L5. 5. 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 Assessment and Recommendations: 1. Thrombocytopenia  Improving steadily. Uncertain etiology, but likely multifactorial, with bone marrow suppression from MTX and consumption related to ongoing bleeding. Additional labs so far unrevealing. Continue to hold MTX and monitor CBC. No indication for transfusion at this time, but would consider if <50 while actively bleeding. 2. Anemia, normocytic  Likely multifactorial, as above, with bone marrow suppression from MTX and now significant blood loss from hematuria. A few additional labs pending, but unlikely to yield additional information. Continue to monitor and transfuse for Hgb < 8 due to cardiac hx      3. Hematuria  Unclear etiology; chronic indwelling catheter. CT with questionable papillary mass at right UO. Urology following. May need cystoscopy. With PLT >100k, ok from heme perspective if he needs a procedure. 4.Leukocytosis  Back up again today after starting steroids 11/1. Monitor. 5.Afib  Cardio following. No anticoagulation in setting of hematuria    6. Mediastinal mass/ RUL nodule  Noted on CT Scan; appears chronic and stable    7. Compression fx (T7,T9 and L2)  Noted on CT scan; new since 2013    8. ? Pancreatic mass  Likely an IPMN based on CT scan. Depending on goals of care moving forward, this could potentially be evaluated by GI as an outpatient. 9. Hx Prostate Cancer  Follows with Dr Karol Harkins; on intermittent ADT    10. RA  Follows with Dr Faiza Baptiste, but not seen by their office in about 1.5 years; receives MTX every Wed; last dose 10/25/17. MTX now on hold due to plt count    11. Debility  PT/OT consulted        Signed By: Alessio Narayanan MD November 2, 2017

## 2017-11-02 NOTE — CARDIO/PULMONARY
Cardiac Rehab: Following patient for cardiac education on AFib. LVEF 55-60% by echo (17).  Cardiologist is Dr Mu Leal.   [address]  Cardiac Meds:  ACE/ARB - none  BB - metoprolol succinate  Statin - none   ASA - none  Prior to admission meds also included: Lasix. New PO Cardiac Medications:  diltiazem. Smoking History: Former smoker. Pt started smoking at age 21, up to 1 ppd, and quit at age 62. Met with Lyle Jay and his wife on IVCU. 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 Teaching was done primarily with wife due to pt's decreased memory. Discussed wife's understanding of pt's cardiac condition and tx plan. Provided AFib education folder. Explained AFib & problem with fast HR. Reviewed possible triggers. Also reviewed increased risk of CVA. Wife aware pt will not be started on blood thinner, due to risk of bleeding/hematuria. Wife reported pt's father  with a heart attack in his 63's. Per cardiologist, plan for conservative management. Reviewed new PO med (diltiazem), with emphasis on purpose of medication & potential side effects. Wife reported she takes Cardizem for her BP. Encouraged conversation with MD if they have any difficulty obtaining meds, or if they suspect side effects. Pt's smoking history was reviewed. Wife reported that their children all smoke even though they have seen pt with advanced lung disese. The son who lives with them only smokes outside. At wife's request, provided handout on smoking cessation for son.  Jaziel Galvez will benefit from ongoing reinforcement, due to memory limitations. Wife verbalized basic understanding of info provided. All wife's questions were answered. 2017 Patient continues in [address], intermittently on diltiazem gtt. Diltiazem PO dose has been increased. No family available. Cardiac teaching deferred. 2017 Pt in SR; no longer on dilt gtt.  Cardiology has signed off. No family available.

## 2017-11-02 NOTE — PROGRESS NOTES
Name: Frank Nan: [address]   : 1935 Admit Date: 10/31/2017   Phone: [phone]  Room: [address] [place] [address]   PCP: Emi Narayanan MD  MRN: [id number]   Date: 2017  Code: DNR          Chart and notes reviewed. Data reviewed. I review the patient's current medications in the medical record at each encounter. I have evaluated and examined the patient. Overnight events  Afebrile  HR 90s-100s  Sats 96% on 3L  WBC 20.5  Hgb 8.1 - trending down  Haptoglobin 152  Plt 136 - better  Na 130 - better    Cardizem drip off since 7:25 this morning    ECHO: EF 37-78%; grade 1 diastolic dysfunction    ROS: Catheter clotted when CBI stopped for CT. Urology evaluated overnight and was able to remove multiple clots with irrigation after 24F placed. Feeling better this morning. Bladder/abd/penile discomfort is improved after irrigation. Reports some SOB, but better than admission. Denies CP. Denies fever or chills. Reports nonproductive cough - denies much in the way of chest congestion. Denies abd pain or LE pain/swelling.     Current Facility-Administered Medications   Medication Dose Route Frequency    lidocaine (XYLOCAINE) 2 % jelly   Mucous Membrane PRN    dilTIAZem (CARDIZEM) 125 mg in dextrose 5% 125 mL infusion  0-15 mg/hr IntraVENous CONTINUOUS    albuterol-ipratropium (DUO-NEB) 2.5 MG-0.5 MG/3 ML  3 mL Nebulization Q6H RT    methylPREDNISolone (PF) (SOLU-MEDROL) injection 60 mg  60 mg IntraVENous Q8H    budesonide (PULMICORT) 500 mcg/2 ml nebulizer suspension  500 mcg Nebulization BID RT    arformoterol (BROVANA) neb solution 15 mcg  15 mcg Nebulization BID RT    sodium chloride (NS) flush 5-10 mL  5-10 mL IntraVENous Q8H    sodium chloride (NS) flush 5-10 mL  5-10 mL IntraVENous PRN    0.9% sodium chloride infusion  75 mL/hr IntraVENous CONTINUOUS    acetaminophen (TYLENOL) tablet 650 mg  650 mg Oral Q4H PRN    ondansetron (ZOFRAN) injection 4 mg  4 mg IntraVENous Q4H PRN    albuterol (PROVENTIL VENTOLIN) nebulizer solution 2.5 mg  2.5 mg Nebulization Q2H PRN    cefTRIAXone (ROCEPHIN) 1 g in 0.9% sodium chloride (MBP/ADV) 50 mL  1 g IntraVENous Q24H    busPIRone (BUSPAR) tablet 5 mg  5 mg Oral BID    folic acid (FOLVITE) tablet 1 mg  1 mg Oral DAILY WITH DINNER    metoprolol succinate (TOPROL-XL) XL tablet 25 mg  25 mg Oral BID    montelukast (SINGULAIR) tablet 10 mg  10 mg Oral DAILY WITH DINNER    pantoprazole (PROTONIX) tablet 40 mg  40 mg Oral ACB    theophylline ER (FRANCISCO-24) capsule 200 mg  200 mg Oral BID         REVIEW OF SYSTEMS   12 point ROS negative except as stated in the HPI. Physical Exam:   Visit Vitals    BP (!) 119/105    Pulse 95    Temp 98 °F (36.7 °C)    Resp 21    Ht 5' 10\" (1.778 m)    Wt 68.4 kg (150 lb 12.7 oz)    SpO2 96%    BMI 21.64 kg/m2       General:  Alert, cooperative, no distress, appears stated age. Head:  Normocephalic, without obvious abnormality, atraumatic. Eyes:  Conjunctivae/corneas clear. Nose: Nares normal. Septum midline. Mucosa normal.    Throat: Lips, mucosa, and tongue normal.    Neck: Supple, symmetrical, trachea midline, no adenopathy. Lungs:   Diminished bilaterally; no wheeze noted this morning. Chest wall:  No tenderness or deformity. Heart:  Irregular, rate 76I-258A, no murmur, click, rub or gallop. Abdomen:   Soft, non-tender. Bowel sounds normal. No masses,  No organomegaly. Extremities: Extremities normal, atraumatic, no cyanosis or edema. Pulses: 2+ and symmetric all extremities.    Skin: Scattered ecchymosis, decreased skin turgor normal.    Lymph nodes: Cervical, supraclavicular nodes normal.   Neurologic: Grossly nonfocal       Lab Results   Component Value Date/Time    Sodium 130 11/02/2017 05:43 AM    Potassium 4.0 11/02/2017 05:43 AM    Chloride 95 11/02/2017 05:43 AM    CO2 27 11/02/2017 05:43 AM    BUN 21 11/02/2017 05:43 AM    Creatinine 0.96 11/02/2017 05:43 AM    Glucose 157 11/02/2017 05:43 AM    Calcium 9.1 11/02/2017 05:43 AM    Magnesium 1.8 11/02/2017 05:43 AM    Phosphorus 3.0 11/02/2017 05:43 AM    Lactic acid 1.7 10/31/2017 03:36 PM       Lab Results   Component Value Date/Time    WBC 20.5 11/02/2017 05:43 AM    HGB 8.1 11/02/2017 05:43 AM    PLATELET 203 99/80/6583 05:43 AM    MCV 85.5 11/02/2017 05:43 AM       Lab Results   Component Value Date/Time    INR 1.1 11/01/2017 04:25 AM    aPTT 29.5 10/31/2017 03:45 PM    AST (SGOT) 56 11/02/2017 05:43 AM    Alk. phosphatase 55 11/02/2017 05:43 AM    Protein, total 5.2 11/02/2017 05:43 AM    Albumin 2.8 11/02/2017 05:43 AM    Globulin 2.4 11/02/2017 05:43 AM       Lab Results   Component Value Date/Time    Iron 33 11/01/2017 02:26 PM    TIBC 219 11/01/2017 02:26 PM    Iron % saturation 15 11/01/2017 02:26 PM    Ferritin 585 11/01/2017 02:26 PM       Lab Results   Component Value Date/Time    TSH 0.74 11/01/2017 04:25 AM        No results found for: PH, PHI, PCO2, PCO2I, PO2, PO2I, HCO3, HCO3I, FIO2, FIO2I    Lab Results   Component Value Date/Time     10/31/2017 03:36 PM    CK-MB Index 2.7 10/31/2017 03:36 PM    Troponin-I, Qt. <0.04 11/01/2017 12:49 PM        No results found for: CULT    No results found for: TOXA1, RPR, HBCM, HBSAG, HAAB, HCAB1, HAAT, G6PD, CRYAC, HIVGT, HIVR, HIV1, HIV12, HIVPC, HIVRPI    Lab Results   Component Value Date/Time     10/31/2017 03:36 PM       No results found for: COLOR, APPRN, SPGRU, BRANT, PROTU, GLUCU, KETU, BILU, BLDU, UROU, NAVEED, LEUKU, WBCU, RBCU, UEPI, BACTU, CASTS, UCRY      Images: personally visualized    CT abd/pelvis (11/1/17):   1. Questionable papillary mass at the right ureteral orifice. 2. Mild left hydronephrosis and delayed excretion may be due to the proximity of  the Moore catheter balloon to the left ureterovesical junction. 3. Emphysema. 4. Osteoporosis. Age-indeterminate compression fractures L2-L5.   5. 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 IMPRESSION  · Hematuria with ? small bladder mass vs clot  · Atrial fibrillation with RVR  · Chronic hypoxia  · COPD with potential exacerbation  · Hyponatremia  · Thrombocytopenia  · Abnormal chest CT: mediastinal mass and RUL nodule; stable since 2012  · RA  · HTN  · Hx of prostate cancer: currently treated with Lupron    PLAN  · Urology following. Continue CBI. Consider cystoscopy/fulguration if clots/bleeding worsen. · Continue gentle NS IVFs; watch Na  · Hematology following; holding MTX for now. Watch Hgb and plt. Would transfuse if Hgb < 8 given cardiac hx and plt < 50 while active bleeding. · Cardiology following. Off dilt drip this morning. Rate control with metoprolol. · Continue Brovana/Pulmicort nebs is substitution for home Symbicort. Continue Singulair and Theophylline (levels okay at this time). Continue Duonebs. · Continue O2 to keep sats > 90%; baseline is 2.5L  · Solumedrol weaned to 40 mg IV q 8hr  · Continue empiric ceftriaxone and f/u urine cx  · GI prophylaxis: Protonix (on at baseline)  · DVT prophylaxis: SCDs    Discussed with nursing.     [place], [place]

## 2017-11-02 NOTE — PROGRESS NOTES
Urology Progress Note    Admit Date:  10/31/2017    Admit Dx: Mehdi Hamilton hematuria        SUBJECTIVE:     Called to bedside for catheter not draining. Patient went to CT and had CBI stopped for that period. Catheter clotted off thereafter. Nursing unable to irrigate catheter. Patient with penile and abdominal discomfort. OBJECTIVE:     Vitals:  Patient Vitals for the past 8 hrs:   BP Temp Pulse Resp SpO2   11/02/17 0203 - - - - 99 %   11/01/17 2300 139/51 98.5 °F (36.9 °C) (!) 109 18 96 %   11/01/17 2200 - - (!) 104 18 97 %   11/01/17 2100 115/50 - 99 17 98 %   11/01/17 2000 108/53 98.5 °F (36.9 °C) 92 19 98 %   11/01/17 1950 - - - - 100 %   [id number] 0701 - 11/01 1900  In: 34977.3 [P.O.:530; I.V.:681.3]  Out: 04057 [Urine:55868]     Last 8 hours  11/01 1901 - 11/02 0700  In: 2388.8 [I.V.:1138.8]  Out: 65 [Urine:1700]    Drain Output (last 8hr):         Physical Exam:   Mild distress  Abd: soft, tender in suprapubic region  Gu: glandular catheter hypospadias, catheter not draining      ABDOMINAL CT  1. Questionable papillary mass at the right ureteral orifice. 2. Mild left hydronephrosis and delayed excretion may be due to the proximity of  the Moore catheter balloon to the left ureterovesical junction. 3. Emphysema. 4. Osteoporosis. Age-indeterminate compression fractures L2-L5. 5. 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 Labs:  CBC: Lab Results   Component Value Date/Time    HGB 9.0 11/01/2017 12:49 PM    HCT 27.4 11/01/2017 12:49 PM     BMP:   Lab Results   Component Value Date/Time    BUN 22 11/01/2017 04:25 AM    Creatinine 1.02 11/01/2017 04:25 AM             Assessment:       Cap with gross hematuria    Plan:     - unable to irrigate 20F 3way catheter. 24F 2 way placed and multiple clots removed with irrigation. Urine still with hematuria likely from continued bleeding.  For patient comfort, catheter changed back to 20F 3 way and CBI restarted. Urine clear on 75% drip. Patient comfortable. Lidocaine jelly applied for meatal discomfort. Reviewed CBI protocol with nursing.  - CT scan reviewed. Balloon with 30F possibly obstructing UO. 10cc placed in new catheter.  Will need to determine risk of cysto to evaluate possible papillary mass although may have been clot  - Dr Mathiasd Him or Dayan Diop to see tomorrow              Signed By: Keny Mathew MD - November 2, 2017

## 2017-11-02 NOTE — PROGRESS NOTES
1700. Patient had multiple exchanges of NS bags for irrigation throughout the day. Multiple clots were passed. MD notified and new orders were given to make the patient NPO after midnight. 1925 Bedside shift change report given to Nhan Solo (oncoming nurse) by Karol Massey. Jorja Severe RN (offgoing nurse). Report included the following information SBAR, Kardex, Procedure Summary, Intake/Output, MAR and Accordion.

## 2017-11-02 NOTE — PROGRESS NOTES
New [place] resumption order received and faxed in [employer] to [facility].   Cathy Teixeira LCSW

## 2017-11-02 NOTE — PROGRESS NOTES
Problem: Pressure Injury - Risk of  Goal: *Prevention of pressure ulcer  Outcome: Progressing Towards Goal  Variance: Other (add note)  Comments: Pt is able to turn self but needs help being pulled up in bed and head needs to stay elevated while being pulled up

## 2017-11-02 NOTE — PROGRESS NOTES
Sean Murillo [facility] [address]  [address] [facility], [address] [place] [address]  [phone]      Medical Progress Note      NAME: Quynh Ruvalcaba   :  1935  MRM:  [id number]    Date/Time: 2017         Subjective:     Chief Complaint:  Patient was seen and examined by me. Chart reviewed. afib better, off dilt gtt. Still with hematuria       Objective:       Vitals:       Last 24hrs VS reviewed since prior progress note.  Most recent are:    Visit Vitals    BP (!) 119/105    Pulse 95    Temp 98 °F (36.7 °C)    Resp 21    Ht 5' 10\" (1.778 m)    Wt 68.4 kg (150 lb 12.7 oz)    SpO2 96%    BMI 21.64 kg/m2     SpO2 Readings from Last 6 Encounters:   17 96%    O2 Flow Rate (L/min): 3 l/min       Intake/Output Summary (Last 24 hours) at 17 0854  Last data filed at 17 0654   Gross per 24 hour   Intake          8361.25 ml   Output             7300 ml   Net          1061.25 ml        Exam:     Physical Exam:    Gen:  Disheveled, elderly, chronically ill appearing, NAD  HEENT:  Pink conjunctivae, PERRL, hearing intact to voice, moist mucous membranes  Neck:  Supple, without masses, thyroid non-tender  Resp:  No accessory muscle use, poor air movement   Card:  Irregular, no murmurs, normal S1, S2 without thrills, bruits or peripheral edema  Abd:  Soft, non-tender, non-distended, normoactive bowel sounds are present  Musc:  No cyanosis or clubbing  Skin:  Multiple ecchymosis on arms  Neuro:  Cranial nerves 3-12 are grossly intact, follows commands appropriately  Psych:  Good insight, oriented to person, place and time, alert    Medications Reviewed: (see below)    Lab Data Reviewed: (see below)    ______________________________________________________________________    Medications:     Current Facility-Administered Medications   Medication Dose Route Frequency    lidocaine (XYLOCAINE) 2 % jelly   Mucous Membrane PRN    predniSONE (DELTASONE) tablet 40 mg  40 mg Oral DAILY WITH BREAKFAST    dilTIAZem (CARDIZEM) 125 mg in dextrose 5% 125 mL infusion  0-15 mg/hr IntraVENous CONTINUOUS    albuterol-ipratropium (DUO-NEB) 2.5 MG-0.5 MG/3 ML  3 mL Nebulization Q6H RT    budesonide (PULMICORT) 500 mcg/2 ml nebulizer suspension  500 mcg Nebulization BID RT    arformoterol (BROVANA) neb solution 15 mcg  15 mcg Nebulization BID RT    sodium chloride (NS) flush 5-10 mL  5-10 mL IntraVENous Q8H    sodium chloride (NS) flush 5-10 mL  5-10 mL IntraVENous PRN    0.9% sodium chloride infusion  75 mL/hr IntraVENous CONTINUOUS    acetaminophen (TYLENOL) tablet 650 mg  650 mg Oral Q4H PRN    ondansetron (ZOFRAN) injection 4 mg  4 mg IntraVENous Q4H PRN    albuterol (PROVENTIL VENTOLIN) nebulizer solution 2.5 mg  2.5 mg Nebulization Q2H PRN    cefTRIAXone (ROCEPHIN) 1 g in 0.9% sodium chloride (MBP/ADV) 50 mL  1 g IntraVENous Q24H    busPIRone (BUSPAR) tablet 5 mg  5 mg Oral BID    folic acid (FOLVITE) tablet 1 mg  1 mg Oral DAILY WITH DINNER    metoprolol succinate (TOPROL-XL) XL tablet 25 mg  25 mg Oral BID    montelukast (SINGULAIR) tablet 10 mg  10 mg Oral DAILY WITH DINNER    pantoprazole (PROTONIX) tablet 40 mg  40 mg Oral ACB    theophylline ER (FRANCISCO-24) capsule 200 mg  200 mg Oral BID          Lab Review:     Recent Labs      11/02/17   0543  11/01/17   1249  11/01/17   0425   WBC  20.5*  9.8  8.5   HGB  8.1*  9.0*  9.3*   HCT  23.5*  27.4*  27.1*   PLT  136*  104*  81*     Recent Labs      11/02/17   0543  11/01/17   0425  10/31/17   1545  10/31/17   1536   NA  130*  127*   --   125*   K  4.0  4.7   --   4.6   CL  95*  91*   --   86*   CO2  27  27   --   28   GLU  157*  147*   --   111*   BUN  21*  22*   --   22*   CREA  0.96  1.02   --   1.15   CA  9.1  9.2   --   9.2   MG  1.8  1.9   --   1.7   PHOS  3.0   --    --    --    ALB  2.8*   --    --   3.6   TBILI  0.3   --    --   1.0   SGOT  56*   --    --   37   ALT  26   --    --   23   INR   --   1.1  1.0   --      No results found for: GLUCPOC       Assessment / Plan:     Principal Problem:    79 yo hx of HTN, COPD, mediastinal mass, RA, prostate CA w/ chronic jha, chronic debility, presented w/ hematuria. Incidental findings of afib RVR, hyponatremia, thrombocytopenia, multiple compression fx of spine, possible papillary mass or R ureter    1) Gross hematuria: has chronic indwelling catheter. Also hx of prostate CA. CT concerning for papillary mass of R ureter. Cont bladder irrigation. Will need cysto. Urology following    2) Acute blood loss anemia: due to above. Will monitor Hgb closely, transfuse prn    3) Complicated UTI related to an indwelling catheter: monitor urine Cx. Cont IV CTX    4) Afib RVR: new onset, no prior hx of afib. Echo with EF 55-60%. Off dilt gtt on 11/01. Cont toprol. Cards following    5) COPD exacerbation/chronic resp failure/hypoxia: on chronic 2.5L O2. Will cont duo nebs, Wean IV steroids to PO. Pulm following    6) Mediastinal mass: seems chronic. Defer to pulm    7) Thrombocytopenia/ecchymosis: could be from MTX, underlying cancer, acute illness. Seems to be improving. Holding MTX, NSAIDS. Hematology following    8) Leukocytosis: stress response, steroids. Will monitor    9) Hyponatremia: likely due to volume depletion. Improving, Cont IVF. Monitor BMP    10) HTN: cont toprol    11) RA: holding MTX/bactrim    12) Multiple spine compression fracture: incidental findings on CT of T2, T9, L2. Chronically debilitated at home. Cont pain control.   Consult PT/OT    Total time spent with patient: [address] discussed with: Patient, nursing    Discussed:  Care Plan    Prophylaxis:  SCD's    Disposition:  SNF/LTC vs HH           ___________________________________________________    Attending Physician: Edenilson Gutierrez MD

## 2017-11-02 NOTE — PROGRESS NOTES
Physical Therapy Note:    Chart reviewed, discussed with RN. PT treatment attempted and deferred. Pt declining participation with PT. Pt educated regarding benefits of mobility and importance of participation to improve mobility and regain ability to transfer with spouse assistance. Pt verbalizes understanding and continues to decline participation. Bed left in chair position at pt request with lines/leads intact and call bell in reach. Discussed with RN. Will continue to follow per POC.      Oma Villegas, PT, DPT  8 mins

## 2017-11-03 LAB
ALBUMIN SERPL ELPH-MCNC: 2.9 G/DL (ref 2.9–4.4)
ALBUMIN/GLOB SERPL: 1.4 {RATIO} (ref 0.7–1.7)
ALPHA1 GLOB SERPL ELPH-MCNC: 0.3 G/DL (ref 0–0.4)
ALPHA2 GLOB SERPL ELPH-MCNC: 0.6 G/DL (ref 0.4–1)
ANION GAP SERPL CALC-SCNC: 8 MMOL/L (ref 5–15)
B-GLOBULIN SERPL ELPH-MCNC: 0.7 G/DL (ref 0.7–1.3)
BUN SERPL-MCNC: 19 MG/DL (ref 6–20)
BUN/CREAT SERPL: 26 (ref 12–20)
CALCIUM SERPL-MCNC: 9.3 MG/DL (ref 8.5–10.1)
CHLORIDE SERPL-SCNC: 99 MMOL/L (ref 97–108)
CO2 SERPL-SCNC: 28 MMOL/L (ref 21–32)
CREAT SERPL-MCNC: 0.73 MG/DL (ref 0.7–1.3)
ERYTHROCYTE [DISTWIDTH] IN BLOOD BY AUTOMATED COUNT: 14.3 % (ref 11.5–14.5)
GAMMA GLOB SERPL ELPH-MCNC: 0.5 G/DL (ref 0.4–1.8)
GLOBULIN SER CALC-MCNC: 2.1 G/DL (ref 2.2–3.9)
GLUCOSE SERPL-MCNC: 138 MG/DL (ref 65–100)
HCT VFR BLD AUTO: 23.1 % (ref 36.6–50.3)
HCV AB SERPL QL IA: NONREACTIVE
HCV COMMENT,HCGAC: NORMAL
HGB BLD-MCNC: 7.9 G/DL (ref 12.1–17)
M PROTEIN SERPL ELPH-MCNC: ABNORMAL G/DL
MAGNESIUM SERPL-MCNC: 1.9 MG/DL (ref 1.6–2.4)
MCH RBC QN AUTO: 29.9 PG (ref 26–34)
MCHC RBC AUTO-ENTMCNC: 34.2 G/DL (ref 30–36.5)
MCV RBC AUTO: 87.5 FL (ref 80–99)
PHOSPHATE SERPL-MCNC: 2.4 MG/DL (ref 2.6–4.7)
PLATELET # BLD AUTO: 162 K/UL (ref 150–400)
POTASSIUM SERPL-SCNC: 4 MMOL/L (ref 3.5–5.1)
PROT SERPL-MCNC: 5 G/DL (ref 6–8.5)
RBC # BLD AUTO: 2.64 M/UL (ref 4.1–5.7)
SODIUM SERPL-SCNC: 135 MMOL/L (ref 136–145)
WBC # BLD AUTO: 20 K/UL (ref 4.1–11.1)

## 2017-11-03 PROCEDURE — 94640 AIRWAY INHALATION TREATMENT: CPT

## 2017-11-03 PROCEDURE — 74011250636 HC RX REV CODE- 250/636: Performed by: INTERNAL MEDICINE

## 2017-11-03 PROCEDURE — 74011636637 HC RX REV CODE- 636/637: Performed by: INTERNAL MEDICINE

## 2017-11-03 PROCEDURE — 74011250637 HC RX REV CODE- 250/637: Performed by: UROLOGY

## 2017-11-03 PROCEDURE — 77010033678 HC OXYGEN DAILY

## 2017-11-03 PROCEDURE — 77030011256 HC DRSG MEPILEX <16IN NO BORD MOLN -A

## 2017-11-03 PROCEDURE — 80048 BASIC METABOLIC PNL TOTAL CA: CPT | Performed by: INTERNAL MEDICINE

## 2017-11-03 PROCEDURE — 97530 THERAPEUTIC ACTIVITIES: CPT

## 2017-11-03 PROCEDURE — 74011250637 HC RX REV CODE- 250/637: Performed by: INTERNAL MEDICINE

## 2017-11-03 PROCEDURE — 74011000258 HC RX REV CODE- 258: Performed by: INTERNAL MEDICINE

## 2017-11-03 PROCEDURE — 65660000000 HC RM CCU STEPDOWN

## 2017-11-03 PROCEDURE — 84100 ASSAY OF PHOSPHORUS: CPT | Performed by: INTERNAL MEDICINE

## 2017-11-03 PROCEDURE — 74011000250 HC RX REV CODE- 250: Performed by: INTERNAL MEDICINE

## 2017-11-03 PROCEDURE — 85027 COMPLETE CBC AUTOMATED: CPT | Performed by: INTERNAL MEDICINE

## 2017-11-03 PROCEDURE — 86900 BLOOD TYPING SEROLOGIC ABO: CPT | Performed by: PHYSICIAN ASSISTANT

## 2017-11-03 PROCEDURE — 74011250636 HC RX REV CODE- 250/636: Performed by: PHYSICIAN ASSISTANT

## 2017-11-03 PROCEDURE — 36430 TRANSFUSION BLD/BLD COMPNT: CPT

## 2017-11-03 PROCEDURE — 74011250637 HC RX REV CODE- 250/637: Performed by: PHYSICIAN ASSISTANT

## 2017-11-03 PROCEDURE — 74011000250 HC RX REV CODE- 250: Performed by: NURSE PRACTITIONER

## 2017-11-03 PROCEDURE — 74011000250 HC RX REV CODE- 250: Performed by: PHYSICIAN ASSISTANT

## 2017-11-03 PROCEDURE — 83735 ASSAY OF MAGNESIUM: CPT | Performed by: INTERNAL MEDICINE

## 2017-11-03 PROCEDURE — 74011000258 HC RX REV CODE- 258: Performed by: NURSE PRACTITIONER

## 2017-11-03 PROCEDURE — 36415 COLL VENOUS BLD VENIPUNCTURE: CPT | Performed by: INTERNAL MEDICINE

## 2017-11-03 PROCEDURE — 74011250637 HC RX REV CODE- 250/637: Performed by: NURSE PRACTITIONER

## 2017-11-03 PROCEDURE — 86923 COMPATIBILITY TEST ELECTRIC: CPT | Performed by: PHYSICIAN ASSISTANT

## 2017-11-03 PROCEDURE — P9016 RBC LEUKOCYTES REDUCED: HCPCS | Performed by: PHYSICIAN ASSISTANT

## 2017-11-03 RX ORDER — PREDNISONE 20 MG/1
40 TABLET ORAL 2 TIMES DAILY WITH MEALS
Status: DISCONTINUED | OUTPATIENT
Start: 2017-11-03 | End: 2017-11-06

## 2017-11-03 RX ORDER — DIGOXIN 250 MCG
0.25 TABLET ORAL DAILY
Status: DISCONTINUED | OUTPATIENT
Start: 2017-11-03 | End: 2017-11-03

## 2017-11-03 RX ORDER — DILTIAZEM HYDROCHLORIDE 60 MG/1
60 TABLET, FILM COATED ORAL
Status: DISCONTINUED | OUTPATIENT
Start: 2017-11-03 | End: 2017-11-05

## 2017-11-03 RX ORDER — DIGOXIN 250 MCG
0.25 TABLET ORAL EVERY 6 HOURS
Status: DISCONTINUED | OUTPATIENT
Start: 2017-11-03 | End: 2017-11-03

## 2017-11-03 RX ORDER — SODIUM CHLORIDE 9 MG/ML
250 INJECTION, SOLUTION INTRAVENOUS AS NEEDED
Status: DISCONTINUED | OUTPATIENT
Start: 2017-11-03 | End: 2017-11-08 | Stop reason: HOSPADM

## 2017-11-03 RX ORDER — POLYETHYLENE GLYCOL 3350 17 G/17G
17 POWDER, FOR SOLUTION ORAL DAILY
Status: DISCONTINUED | OUTPATIENT
Start: 2017-11-04 | End: 2017-11-03

## 2017-11-03 RX ORDER — MAGNESIUM SULFATE HEPTAHYDRATE 40 MG/ML
2 INJECTION, SOLUTION INTRAVENOUS ONCE
Status: COMPLETED | OUTPATIENT
Start: 2017-11-03 | End: 2017-11-03

## 2017-11-03 RX ORDER — DILTIAZEM HYDROCHLORIDE 5 MG/ML
10 INJECTION INTRAVENOUS ONCE
Status: COMPLETED | OUTPATIENT
Start: 2017-11-03 | End: 2017-11-03

## 2017-11-03 RX ORDER — SOLIFENACIN SUCCINATE 5 MG/1
5 TABLET, FILM COATED ORAL DAILY
Status: DISCONTINUED | OUTPATIENT
Start: 2017-11-03 | End: 2017-11-08 | Stop reason: HOSPADM

## 2017-11-03 RX ORDER — POLYETHYLENE GLYCOL 3350 17 G/17G
17 POWDER, FOR SOLUTION ORAL DAILY
Status: DISCONTINUED | OUTPATIENT
Start: 2017-11-03 | End: 2017-11-08 | Stop reason: HOSPADM

## 2017-11-03 RX ADMIN — PREDNISONE 40 MG: 20 TABLET ORAL at 08:34

## 2017-11-03 RX ADMIN — IPRATROPIUM BROMIDE AND ALBUTEROL SULFATE 3 ML: .5; 3 SOLUTION RESPIRATORY (INHALATION) at 02:52

## 2017-11-03 RX ADMIN — Medication 10 ML: at 22:14

## 2017-11-03 RX ADMIN — IPRATROPIUM BROMIDE AND ALBUTEROL SULFATE 3 ML: .5; 3 SOLUTION RESPIRATORY (INHALATION) at 13:17

## 2017-11-03 RX ADMIN — DIBASIC SODIUM PHOSPHATE, MONOBASIC POTASSIUM PHOSPHATE AND MONOBASIC SODIUM PHOSPHATE 1 TABLET: 852; 155; 130 TABLET ORAL at 12:16

## 2017-11-03 RX ADMIN — BUDESONIDE 500 MCG: 0.5 INHALANT RESPIRATORY (INHALATION) at 20:30

## 2017-11-03 RX ADMIN — CEFTRIAXONE SODIUM 1 G: 1 INJECTION, POWDER, FOR SOLUTION INTRAMUSCULAR; INTRAVENOUS at 20:16

## 2017-11-03 RX ADMIN — DILTIAZEM HYDROCHLORIDE 30 MG: 30 TABLET, FILM COATED ORAL at 08:34

## 2017-11-03 RX ADMIN — POLYETHYLENE GLYCOL 3350 17 G: 17 POWDER, FOR SOLUTION ORAL at 14:43

## 2017-11-03 RX ADMIN — ARFORMOTEROL TARTRATE 15 MCG: 15 SOLUTION RESPIRATORY (INHALATION) at 08:06

## 2017-11-03 RX ADMIN — DILTIAZEM HYDROCHLORIDE 5 MG/HR: 5 INJECTION INTRAVENOUS at 09:40

## 2017-11-03 RX ADMIN — BUDESONIDE 500 MCG: 0.5 INHALANT RESPIRATORY (INHALATION) at 08:06

## 2017-11-03 RX ADMIN — SOLIFENACIN SUCCINATE 5 MG: 5 TABLET, FILM COATED ORAL at 16:38

## 2017-11-03 RX ADMIN — PREDNISONE 40 MG: 20 TABLET ORAL at 16:38

## 2017-11-03 RX ADMIN — DIBASIC SODIUM PHOSPHATE, MONOBASIC POTASSIUM PHOSPHATE AND MONOBASIC SODIUM PHOSPHATE 1 TABLET: 852; 155; 130 TABLET ORAL at 17:51

## 2017-11-03 RX ADMIN — DILTIAZEM HYDROCHLORIDE 10 MG: 5 INJECTION INTRAVENOUS at 09:22

## 2017-11-03 RX ADMIN — BUSPIRONE HYDROCHLORIDE 5 MG: 5 TABLET ORAL at 08:34

## 2017-11-03 RX ADMIN — PANTOPRAZOLE SODIUM 40 MG: 40 TABLET, DELAYED RELEASE ORAL at 08:34

## 2017-11-03 RX ADMIN — IPRATROPIUM BROMIDE AND ALBUTEROL SULFATE 3 ML: .5; 3 SOLUTION RESPIRATORY (INHALATION) at 20:26

## 2017-11-03 RX ADMIN — ARFORMOTEROL TARTRATE 15 MCG: 15 SOLUTION RESPIRATORY (INHALATION) at 20:26

## 2017-11-03 RX ADMIN — THEOPHYLLINE ANHYDROUS 200 MG: 200 CAPSULE, EXTENDED RELEASE ORAL at 08:34

## 2017-11-03 RX ADMIN — IPRATROPIUM BROMIDE AND ALBUTEROL SULFATE 3 ML: .5; 3 SOLUTION RESPIRATORY (INHALATION) at 08:01

## 2017-11-03 RX ADMIN — MAGNESIUM SULFATE HEPTAHYDRATE 2 G: 40 INJECTION, SOLUTION INTRAVENOUS at 13:54

## 2017-11-03 RX ADMIN — DILTIAZEM HYDROCHLORIDE 60 MG: 60 TABLET, FILM COATED ORAL at 12:28

## 2017-11-03 RX ADMIN — THEOPHYLLINE ANHYDROUS 200 MG: 200 CAPSULE, EXTENDED RELEASE ORAL at 22:13

## 2017-11-03 RX ADMIN — MONTELUKAST SODIUM 10 MG: 10 TABLET, FILM COATED ORAL at 16:38

## 2017-11-03 RX ADMIN — BUSPIRONE HYDROCHLORIDE 5 MG: 5 TABLET ORAL at 22:13

## 2017-11-03 RX ADMIN — DILTIAZEM HYDROCHLORIDE 60 MG: 60 TABLET, FILM COATED ORAL at 16:38

## 2017-11-03 RX ADMIN — FOLIC ACID 1 MG: 1 TABLET ORAL at 16:38

## 2017-11-03 RX ADMIN — Medication 10 ML: at 05:42

## 2017-11-03 RX ADMIN — Medication 10 ML: at 15:07

## 2017-11-03 NOTE — WOUND CARE
Initial wound and skin care consult. Patient alert in bed no pain at this time. Patient turned and assessed with staff nurse. Assessment:  All skin folds and bony prominences assessed. Sacral area pink, blanches and intact. Heels dry and intact. No impairments noted. Treatment:  Patient turned and repositioned with pillows. Staff nurse to place patient on ICU air bed. Feel elevated and offloaded with pillows.      Thierry Crisostomo RN

## 2017-11-03 NOTE — PROGRESS NOTES
Bedside and Verbal shift change report given to Deneise Dancer (oncoming nurse) by Andrez José (offgoing nurse). Report included the following information SBAR, Kardex, Intake/Output and MAR.     0600  Two new bags hung (6000 ml) for continues bladder irrigation. Pt continues have some clots and output is pink, when pt ambulates output becomes much more bloody and viki output is also increased. Pt has been NPO since midnight. Pt has ambulated with max two assist to bedside toilet twice to attempt to have a bowel movement but has only passed gas. 0710 Bedside and Verbal shift change report given to Adilene (oncoming nurse) by Deneise Dancer (offgoing nurse). Report included the following information SBAR, Kardex, ED Summary, Intake/Output, MAR, Recent Results and Cardiac Rhythm Sinus arrythmia.

## 2017-11-03 NOTE — PROGRESS NOTES
Cardiology Progress Note         NAME:  Caden Montez   :   1935   MRN:   [id number]     Assessment/Plan:   PAF RVR triggered by illness, anemia, end stage COPD      - cont rate control - cardizem gtt this am in addition to the po      - may add dig if more rate control needed ; Keep K >4       - No anticoagulation 2/2 hematuria /anemia  Anemia- transfusion planned for today   Hematuria- per urology   HTN- BP generally OK   End stage COPD - home O2/steroids    Hyponatremia- resolved   DNR      Pt personally seen and examined. Chart reviewed.     Agree with advanced NP's history, exam and  A/P with changes/additons.     Pt confused  Neck-no JVD  CVS-S1-S2 present, Irr ; 2/6 systolic murmur present  RS-  Dec A E bilat  Abdomen-  soft/BS+        Shashi Romana Fujita. MD, [facility] [place]            Subjective: asked to resee 2/2 afib RVR this am 200 range    Caden Montez is a 80y.o. year old male w/ hx: HTN , COPD (2.5L NC/steroids/theophylline) cx by PAF (during acute illness a few yrs ago,  no anticoagulation), Prostate Ca on Lupron , RA,  Anemia,  who presented on 10/31 for evaluation of hematuria x 3 days. The patient has a chronic indwelling jha for urinary retention. He denies any traumatic insertion but endorses switching catheter out prior to arrival to hospital.  The patient was placed on a continuous bladder irrigation and is being followed by urology. On admission patient was also noted to be HYPOnatermic    Overnight, the patient was noted to have intermittent A flutter with RVR. Chronic class IV dyspnea He denies any CP, edema, dizziness. Around 0500 patient was started on diltiazem gtt at 5 mg/hr but episodes of rapid a flutter persisted. BP as low as 96/67 during episodes, limiting up-titration of diltiazem.     Per patient, he has had no \"heart problems\" in the past.  Denies CAD, afib, Endorses SOB prior to admission but feels he is at baseline.   He has never had an ischemic workup in the past.        Overnight activities reviewed:    - -126/50  range    - Tele PAF RVR this am  PAF    - cardizem po started yesterday    - K+ 4    Mg++1.9,      Cr better 0.76     - Hgb 7.9 plt better 136     - jha  Cont bladder irrig         Cardiac ROS: chronic class IV dyspnea- breathing at baseline  Patient denies any exertional chest pain,  , palpitations, syncope, orthopnea, edema or paroxysmal nocturnal dyspnea. Review of Systems: hematuria remains, pain  w/ jha change No nausea, indigestion, vomiting, pain, cough, sputum. Taking po. Appetite OK        CARDIAC EVALUATION   ECHO 2017: EF 55-60%, no WMA, G1DD    Objective:     Visit Vitals    BP (!) 117/102    Pulse (!) 102    Temp 97.7 °F (36.5 °C)    Resp 16    Ht 5' 10\" (1.778 m)    Wt 150 lb 12.7 oz (68.4 kg)    SpO2 100%    BMI 21.64 kg/m2      O2 Flow Rate (L/min): 3 l/min O2 Device: Nasal cannula    Temp (24hrs), Av.1 °F (36.7 °C), Min:97.6 °F (36.4 °C), Max:98.4 °F (36.9 °C)        Intake/Output Summary (Last 24 hours) at 17 1043  Last data filed at 17 0800   Gross per 24 hour   Intake         87788.17 ml   Output            11328 ml   Net         -3574.83 ml       TELE: SR PACs PAF     General: AAOx3 cooperative, frail, chronically ill   HEENT: Atraumatic. Pale  and moist.  Anicteric sclerae. Neck: Supple,     Lungs: barrel chest, decreased throughout,sounds better today       Heart: PMI: diminished Irregular rhythm, no murmur, no S3, no rubs, no gallops. No JVD. No carotid bruits. Abdomen: Soft, non-distended, non-tender. + Bowel sounds. No bruits. : jha hematuria CBI   Extremities: skin friable, multiple ecchymotic areas on arms, No edema, no clubbing, no cyanosis. No calf tenderness  Neurologic: tremulous, Grossly intact. Alert and oriented X 3. No acute neurological distress. Psych: Good insight. Not anxious nor agitated.       Care Plan discussed with: Comments   Patient y    Family  y Wife and RICHARD Dr Lilia Duval yesterday    RN y    Care Manager                    Consultant:          Data Review:   CMP:   Lab Results   Component Value Date/Time     (L) 11/03/2017 05:41 AM    K 4.0 11/03/2017 05:41 AM    CL 99 11/03/2017 05:41 AM    CO2 28 11/03/2017 05:41 AM    AGAP 8 11/03/2017 05:41 AM     (H) 11/03/2017 05:41 AM    BUN 19 11/03/2017 05:41 AM    CREA 0.73 11/03/2017 05:41 AM    GFRAA >60 11/03/2017 05:41 AM    GFRNA >60 11/03/2017 05:41 AM    CA 9.3 11/03/2017 05:41 AM    MG 1.9 11/03/2017 05:41 AM    PHOS 2.4 (L) 11/03/2017 05:41 AM     CBC:   Lab Results   Component Value Date/Time    WBC 20.0 (H) 11/03/2017 05:41 AM    HGB 7.9 (L) 11/03/2017 05:41 AM    HCT 23.1 (L) 11/03/2017 05:41 AM     11/03/2017 05:41 AM     All Cardiac Markers in the last 24 hours:   No results found for: CPK, CK, CKMMB, CKMB, RCK3, CKMBT, CKNDX, CKND1, YEVGENIY, TROPT, TROIQ, NBA, TROPT, TNIPOC, BNP, BNPP  Recent Glucose Results:   Lab Results   Component Value Date/Time     (H) 11/03/2017 05:41 AM     ABG: No results found for: PH, PHI, PCO2, PCO2I, PO2, PO2I, HCO3, HCO3I, FIO2, FIO2I  LIPIDS:  No results found for: CHOL, HDL, LDLC, VLDL, CHHD    Medications reviewed  Current Facility-Administered Medications   Medication Dose Route Frequency    0.9% sodium chloride infusion 250 mL  250 mL IntraVENous PRN    dilTIAZem (CARDIZEM) 125 mg in dextrose 5% 125 mL infusion  5-15 mg/hr IntraVENous TITRATE    predniSONE (DELTASONE) tablet 40 mg  40 mg Oral BID WITH MEALS    magnesium sulfate 2 g/50 ml IVPB (premix or compounded)  2 g IntraVENous ONCE    phosphorus (K PHOS NEUTRAL) 250 mg tablet 1 Tab  1 Tab Oral BID    lidocaine (XYLOCAINE) 2 % jelly   Mucous Membrane PRN    dilTIAZem (CARDIZEM) IR tablet 30 mg  30 mg Oral TIDAC    albuterol-ipratropium (DUO-NEB) 2.5 MG-0.5 MG/3 ML  3 mL Nebulization Q6H RT    budesonide (PULMICORT) 500 mcg/2 ml nebulizer suspension  500 mcg Nebulization BID RT    arformoterol (BROVANA) neb solution 15 mcg  15 mcg Nebulization BID RT    sodium chloride (NS) flush 5-10 mL  5-10 mL IntraVENous Q8H    sodium chloride (NS) flush 5-10 mL  5-10 mL IntraVENous PRN    acetaminophen (TYLENOL) tablet 650 mg  650 mg Oral Q4H PRN    ondansetron (ZOFRAN) injection 4 mg  4 mg IntraVENous Q4H PRN    albuterol (PROVENTIL VENTOLIN) nebulizer solution 2.5 mg  2.5 mg Nebulization Q2H PRN    cefTRIAXone (ROCEPHIN) 1 g in 0.9% sodium chloride (MBP/ADV) 50 mL  1 g IntraVENous Q24H    busPIRone (BUSPAR) tablet 5 mg  5 mg Oral BID    folic acid (FOLVITE) tablet 1 mg  1 mg Oral DAILY WITH DINNER    montelukast (SINGULAIR) tablet 10 mg  10 mg Oral DAILY WITH DINNER    pantoprazole (PROTONIX) tablet 40 mg  40 mg Oral ACB    theophylline ER (FRANCISCO-24) capsule 200 mg  200 mg Oral BID         Shayla Adamson, RN, ACNP-BC, AACC

## 2017-11-03 NOTE — PROGRESS NOTES
0700: Bedside shift change report given to Whitney Black (oncoming nurse) by Melony Demarco (offgoing nurse). Report included the following information SBAR, Kardex, Intake/Output and MAR.   0800: Full assessment completed, see flowsheet for details. 1200: No changes in previous assessment, family at bs  1500: Bedside shift change report given to Megha Samaniego (oncoming nurse) by Whitney Black (offgoing nurse). Report included the following information SBAR, Kardex, Intake/Output and MAR.

## 2017-11-03 NOTE — PROGRESS NOTES
Problem: Falls - Risk of  Goal: *Absence of Falls  Document Chase Fall Risk and appropriate interventions in the flowsheet.    Outcome: Progressing Towards Goal  Fall Risk Interventions:  Mobility Interventions: Bed/chair exit alarm, OT consult for ADLs, Patient to call before getting OOB, PT Consult for mobility concerns, PT Consult for assist device competence         Medication Interventions: Bed/chair exit alarm, Patient to call before getting OOB    Elimination Interventions: Call light in reach, Patient to call for help with toileting needs, Toileting schedule/hourly rounds    History of Falls Interventions: Bed/chair exit alarm, Door open when patient unattended

## 2017-11-03 NOTE — PROGRESS NOTES
1500-Bedside report received from Jayson Hinkle RN. SBAR, Kardex, Intake/Output, MAR, Recent Results and Cardiac Rhythm AFib were discussed. Pt observed resting in bed, without distress. Will assess. Amber Samaniego RN  1845-Summary: Pt has been stable for these few hours. Has denied pain or other distress. Observed working with physical therapy and tolerated fairly well; did become tachycardic in the 150's with the activity. CBI running without difficulty but output remains generally pink tinged to cherry colored. Blood pressure is stable. No outstanding issues to note. Clotilde MACIAS  1930-Bedside and Verbal shift change report given to OTILIO Archer RN (oncoming nurse) by OTILIO Samaniego RN (offgoing nurse). Report included the following information SBAR, Kardex, Intake/Output, MAR, Recent Results and Cardiac Rhythm AFib with RVR. Pt observed resting in bed, family at bedside.   Clotilde MACIAS

## 2017-11-03 NOTE — PROGRESS NOTES
Progress Note    Patient: Jass Simon MRN: [id number]  SSN: [id number]    YOB: 1935  Age: 80 y.o. Sex: male        ADMITTED:  10/31/2017 to Ame Craft MD  for Gross hematuria         Jass Simon was admitted for Gross hematuria. Still bleeding    hgb stable    6 bags of saline last night    I could not demonstrate clots on hand irrigation, urine red on no drip, pink on moderate drip. Vitals:  Temp (24hrs), Av.2 °F (36.8 °C), Min:97.6 °F (36.4 °C), Max:98.4 °F (36.9 °C)     Blood pressure 121/50, pulse 88, temperature 98.3 °F (36.8 °C), resp. rate 14, height 5' 10\" (1.778 m), weight 68.4 kg (150 lb 12.7 oz), SpO2 100 %. I&O's:  701 - 1900  In: 51067.5 [P.O.:480; I.V.:1881.3]  Out: 88607 [Urine:44287]   1901 -  07  In: 83519.2 [P.O.:240; I.V.:285.2]  Out: 28795 [Urine:53613]     Exam:   NAD. abdomen soft, NT     Labs:   Recent Labs      17   0541  17   0543  17   1249   WBC  20.0*  20.5*  9.8   HGB  7.9*  8.1*  9.0*   HCT  23.1*  23.5*  27.4*   PLT  162  136*  104*     Recent Labs      17   0543  17   0425  10/31/17   1536   NA  130*  127*  125*   K  4.0  4.7  4.6   CL  95*  91*  86*   CO2  27  27  28   GLU  157*  147*  111*   BUN  21*  22*  22*   CREA  0.96  1.02  1.15   CA  9.1  9.2  9.2        Cultures:      Imaging:       Assessment:     - Principal Problem:    Gross hematuria (10/31/2017)    Active Problems:    COPD exacerbation (HCC) (10/31/2017)      [facility] ([facility] ()      Mediastinal mass ()      Pulmonary nodule ()      HTN (hypertension) ()      Thrombocytopenia (HCC) (10/31/2017)      Paroxysmal atrial fibrillation ([facility] (2017)        Plan:     - hematuria continues.   - plt normal now will discuss with dr owens to see if we should give more time for plt to work better or if we should proceed with cysto today  -keep npo for now    Discussed with family,  Nereida Calloway. Family in agreement to hold off on cysto today. Will add vesicare or similar if ok with cards in an effort to reduce bladder spasms.      Signed By: Trent Jo MD - November 3, 2017

## 2017-11-03 NOTE — PROGRESS NOTES
Sean Murillo [facility]  [address] [facility], [place], [id number] [place] [address]  [phone]      Medical Progress Note      NAME: Altagracia Farooq   :  1935  MRM:  [id number]    Date/Time: 11/3/2017         Subjective:     Chief Complaint:      No acute events. Sob is stable, no better or worse. Denies chest pain. Still with hematuria       Objective:       Vitals:       Last 24hrs VS reviewed since prior progress note.  Most recent are:    Visit Vitals    /52    Pulse (!) 105    Temp 98.1 °F (36.7 °C)    Resp 17    Ht 5' 10\" (1.778 m)    Wt 68.4 kg (150 lb 12.7 oz)    SpO2 99%    BMI 21.64 kg/m2     SpO2 Readings from Last 6 Encounters:   17 99%    O2 Flow Rate (L/min): 3 l/min       Intake/Output Summary (Last 24 hours) at 17 1420  Last data filed at 17 1340   Gross per 24 hour   Intake         60183.24 ml   Output            98845 ml   Net         -3535.76 ml        Exam:     Physical Exam:    Gen:  elderly, chronically ill appearing, NAD  HEENT:  Pink conjunctivae, PERRL, hearing intact to voice, moist mucous membranes  Neck:  Supple, without masses, thyroid non-tender  Resp:  No accessory muscle use, poor air movement   Card:  Irregular, no murmurs, normal S1, S2 without thrills, bruits or peripheral edema  Abd:  Soft, non-tender, non-distended, normoactive bowel sounds are present  Musc:  No cyanosis or clubbing  Skin:  Multiple ecchymosis on arms  Neuro:  Cranial nerves 3-12 are grossly intact, follows commands appropriately  Psych:  Good insight, oriented to person, place and time, alert    Medications Reviewed: (see below)    Lab Data Reviewed: (see below)    ______________________________________________________________________    Medications:     Current Facility-Administered Medications   Medication Dose Route Frequency    0.9% sodium chloride infusion 250 mL  250 mL IntraVENous PRN    dilTIAZem (CARDIZEM) 125 mg in dextrose 5% 125 mL infusion  5-15 mg/hr IntraVENous TITRATE    predniSONE (DELTASONE) tablet 40 mg  40 mg Oral BID WITH MEALS    magnesium sulfate 2 g/50 ml IVPB (premix or compounded)  2 g IntraVENous ONCE    phosphorus (K PHOS NEUTRAL) 250 mg tablet 1 Tab  1 Tab Oral BID    dilTIAZem (CARDIZEM) IR tablet 60 mg  60 mg Oral TIDAC    [START ON 11/4/2017] solifenacin (VESICARE) tablet 5 mg  5 mg Oral DAILY    polyethylene glycol (MIRALAX) packet 17 g  17 g Oral DAILY    lidocaine (XYLOCAINE) 2 % jelly   Mucous Membrane PRN    albuterol-ipratropium (DUO-NEB) 2.5 MG-0.5 MG/3 ML  3 mL Nebulization Q6H RT    budesonide (PULMICORT) 500 mcg/2 ml nebulizer suspension  500 mcg Nebulization BID RT    arformoterol (BROVANA) neb solution 15 mcg  15 mcg Nebulization BID RT    sodium chloride (NS) flush 5-10 mL  5-10 mL IntraVENous Q8H    sodium chloride (NS) flush 5-10 mL  5-10 mL IntraVENous PRN    acetaminophen (TYLENOL) tablet 650 mg  650 mg Oral Q4H PRN    ondansetron (ZOFRAN) injection 4 mg  4 mg IntraVENous Q4H PRN    albuterol (PROVENTIL VENTOLIN) nebulizer solution 2.5 mg  2.5 mg Nebulization Q2H PRN    cefTRIAXone (ROCEPHIN) 1 g in 0.9% sodium chloride (MBP/ADV) 50 mL  1 g IntraVENous Q24H    busPIRone (BUSPAR) tablet 5 mg  5 mg Oral BID    folic acid (FOLVITE) tablet 1 mg  1 mg Oral DAILY WITH DINNER    montelukast (SINGULAIR) tablet 10 mg  10 mg Oral DAILY WITH DINNER    pantoprazole (PROTONIX) tablet 40 mg  40 mg Oral ACB    theophylline ER (FRANCISCO-24) capsule 200 mg  200 mg Oral BID          Lab Review:     Recent Labs      11/03/17   0541  11/02/17   0543  11/01/17   1249   WBC  20.0*  20.5*  9.8   HGB  7.9*  8.1*  9.0*   HCT  23.1*  23.5*  27.4*   PLT  162  136*  104*     Recent Labs      11/03/17   0541  11/02/17   0543  11/01/17   0425  10/31/17   1545  10/31/17   1536   NA  135*  130*  127*   --   125*   K  4.0  4.0  4.7   --   4.6   CL  99  95*  91*   --   86*   CO2  28  27  27   --   28   GLU  138*  157*  147*   --   111*   BUN 19  21*  22*   --   22*   CREA  0.73  0.96  1.02   --   1.15   CA  9.3  9.1  9.2   --   9.2   MG  1.9  1.8  1.9   --   1.7   PHOS  2.4*  3.0   --    --    --    ALB   --   2.8*   --    --   3.6   TBILI   --   0.3   --    --   1.0   SGOT   --   56*   --    --   37   ALT   --   26   --    --   23   INR   --    --   1.1  1.0   --      No results found for: GLUCPOC       Assessment / Plan:     Gross hematuria: has chronic indwelling catheter. Also hx of prostate CA. CT concerning for papillary mass of R ureter. Cont bladder irrigation. Urology following     Acute blood loss anemia: due to above. Will monitor Hgb closely, transfuse prn    Complicated UTI related to an indwelling catheter: no urine culture sent. Will have to treat empirically with CTX for 7 days    Afib RVR: new onset, no prior hx of afib. Echo with EF 55-60%. Rate elevated today, restarted on dilt gtt. Cardiology following    COPD exacerbation/chronic resp failure/hypoxia: on chronic 2.5L O2. Will cont duo nebs, improved while on IV steroids and now on PO. Pulm following    Mediastinal mass: seems chronic. Defer to pulm    Thrombocytopenia/ecchymosis: could be from MTX, underlying cancer, acute illness. Seems to be improving. Holding MTX, NSAIDS. Hematology following    Leukocytosis: stress response, steroids. Will monitor    Hyponatremia: likely due to volume depletion. Improving, Cont IVF. Monitor BMP    HTN: no BB due to lung dz. dilt started    RA: holding MTX/bactrim    Multiple spine compression fracture: incidental findings on CT of T2, T9, L2. Chronically debilitated at home. Cont pain control.   Consult PT/OT    Total time spent with patient: 39 [address] discussed with: Patient, family, nursing    Discussed:  Care Plan    Prophylaxis:  SCD's    Disposition:  SNF/LTC vs HH           ___________________________________________________    Attending Physician: Nel Kemp MD

## 2017-11-03 NOTE — PROGRESS NOTES
Problem: Mobility Impaired (Adult and Pediatric)  Goal: *Acute Goals and Plan of Care (Insert Text)  Physical Therapy Goals  Initiated 11/1/2017  1. Patient will move from supine to sit and sit to supine , scoot up and down and roll side to side in bed with minimal assistance/contact guard assist within 7 day(s). 2.  Patient will transfer from bed to chair and chair to bed with minimal assistance/contact guard assist using the least restrictive device within 7 day(s). 3.  Patient will perform sit to stand with minimal assistance/contact guard assist within 7 day(s). physical Therapy TREATMENT  Patient: Hernando Collins (81 y.o. male)  Date: 11/3/2017  Diagnosis: Gross hematuria  Hematuria Gross hematuria  Procedure(s) (LRB):  CYSTO FULGURATION (N/A)    Precautions:   Falls    ASSESSMENT:  Pt Princesspedrito Bender presents with improved participation and limited activity tolerance associated with tachycardia to 153 with edge of bed sitting today. Pt offering good efforts and reports personal goal of mobilizing to bedside commode when necessary and appropriate. He requires frequent cues/reminders for therapy tasks and treatment plan. Discussed HR response and pt reports with RN. Progression toward goals:  []    Improving appropriately and progressing toward goals  [x]    Improving slowly and progressing toward goals  []    Not making progress toward goals and plan of care will be adjusted     PLAN:  Patient continues to benefit from skilled intervention to address the above impairments. Continue treatment per established plan of care. Discharge Recommendations:  Demond Jacobsen  Further Equipment Recommendations for Discharge:  Defer to SNF     SUBJECTIVE:   Patient stated I don't want to use a bed pan if I don't have to.     OBJECTIVE DATA SUMMARY:   Critical Behavior:  Neurologic State: Alert  Orientation Level: Oriented X4  Cognition: Appropriate decision making, Appropriate for age attention/concentration, Appropriate safety awareness, Follows commands  Safety/Judgement: Awareness of environment  Functional Mobility Training:  Bed Mobility:     Supine to Sit: Moderate assistance  Sit to Supine: Additional time;Assist x2  Scooting: Additional time;Assist x2; Moderate assistance        Transfers:            deferred due to elevated HR                       Balance:  Sitting: Impaired  Sitting - Static: Good (unsupported)  Sitting - Dynamic: Fair (occasional)                                        Therapeutic Exercises: Ankle pumps x 10, Heel slides x 5 AAROM BLEs, SLR x 5 AAROM BLEs. Pain:  Pain Scale 1: Numeric (0 - 10)  Pain Intensity 1: 0              Activity Tolerance:   Limited by elevated HR. Please refer to the flowsheet for vital signs taken during this treatment.   After treatment:   []    Patient left in no apparent distress sitting up in chair  [x]    Patient left in no apparent distress in bed  [x]    Call bell left within reach  [x]    Nursing notified  []    Caregiver present  []    Bed alarm activated    COMMUNICATION/COLLABORATION:   The patients plan of care was discussed with: Registered Nurse and Rehabilitation Attendant    Cady Whitney PT, DPT   Time Calculation: 26 mins

## 2017-11-03 NOTE — PROGRESS NOTES
Name: Denise Butter: [place]   : 1935 Admit Date: 10/31/2017   Phone: [phone]  Room: [address] [place] [address]   PCP: Sabi Connelly MD  MRN: [id number]   Date: 11/3/2017  Code: DNR          Chart and notes reviewed. Data reviewed. I review the patient's current medications in the medical record at each encounter. I have evaluated and examined the patient. Overnight events  Afebrile  HR 80s-100s  Sats 100% on 3L  WBC 20.0 - stable  Hgb 7.9 - trending down  Plt 162 - better  Na 135 - better  Multiple exchanged on NS and clots passed yesterday evening and overnight per nursing; pink on CBI this morning  6 bags of saline overnight; no clots this morning on hand irrigation by Urology      ROS: Had some discomfort overnight with Moore, better this morning. Denies fever or chills. Denies CP. Reports SOB to be at baseline. Denies abd pain. Denies LE pain/swelling.       Current Facility-Administered Medications   Medication Dose Route Frequency    lidocaine (XYLOCAINE) 2 % jelly   Mucous Membrane PRN    predniSONE (DELTASONE) tablet 40 mg  40 mg Oral DAILY WITH BREAKFAST    dilTIAZem (CARDIZEM) IR tablet 30 mg  30 mg Oral TIDAC    albuterol-ipratropium (DUO-NEB) 2.5 MG-0.5 MG/3 ML  3 mL Nebulization Q6H RT    budesonide (PULMICORT) 500 mcg/2 ml nebulizer suspension  500 mcg Nebulization BID RT    arformoterol (BROVANA) neb solution 15 mcg  15 mcg Nebulization BID RT    sodium chloride (NS) flush 5-10 mL  5-10 mL IntraVENous Q8H    sodium chloride (NS) flush 5-10 mL  5-10 mL IntraVENous PRN    acetaminophen (TYLENOL) tablet 650 mg  650 mg Oral Q4H PRN    ondansetron (ZOFRAN) injection 4 mg  4 mg IntraVENous Q4H PRN    albuterol (PROVENTIL VENTOLIN) nebulizer solution 2.5 mg  2.5 mg Nebulization Q2H PRN    cefTRIAXone (ROCEPHIN) 1 g in 0.9% sodium chloride (MBP/ADV) 50 mL  1 g IntraVENous Q24H    busPIRone (BUSPAR) tablet 5 mg  5 mg Oral BID    folic acid (FOLVITE) tablet 1 mg  1 mg Oral DAILY WITH DINNER    montelukast (SINGULAIR) tablet 10 mg  10 mg Oral DAILY WITH DINNER    pantoprazole (PROTONIX) tablet 40 mg  40 mg Oral ACB    theophylline ER (FRANCISCO-24) capsule 200 mg  200 mg Oral BID         REVIEW OF SYSTEMS   12 point ROS negative except as stated in the HPI. Physical Exam:   Visit Vitals    /50    Pulse 97    Temp 98.4 °F (36.9 °C)    Resp 15    Ht 5' 10\" (1.778 m)    Wt 68.4 kg (150 lb 12.7 oz)    SpO2 100%    BMI 21.64 kg/m2       General:  Alert, cooperative, no acute distress, appears stated age. Head:  Normocephalic, without obvious abnormality, atraumatic. Eyes:  Conjunctivae/corneas clear. Nose: Nares normal. Septum midline. Mucosa normal.    Throat: Lips, mucosa, and tongue normal.    Neck: Supple, symmetrical, trachea midline, no adenopathy. Lungs:   Diminished bilaterally; no wheeze noted this morning. Chest wall:  No tenderness or deformity. Heart:  Irregular, rate 41B-480G, no murmur, click, rub or gallop. Abdomen:   Soft, non-tender. Bowel sounds normal. No masses,  No organomegaly. Extremities: Extremities normal, atraumatic, no cyanosis or edema. Pulses: 2+ and symmetric all extremities.    Skin: Scattered ecchymosis, decreased skin turgor normal.    Lymph nodes: Cervical, supraclavicular nodes normal.   Neurologic: Grossly nonfocal       Lab Results   Component Value Date/Time    Sodium 135 11/03/2017 05:41 AM    Potassium 4.0 11/03/2017 05:41 AM    Chloride 99 11/03/2017 05:41 AM    CO2 28 11/03/2017 05:41 AM    BUN 19 11/03/2017 05:41 AM    Creatinine 0.73 11/03/2017 05:41 AM    Glucose 138 11/03/2017 05:41 AM    Calcium 9.3 11/03/2017 05:41 AM    Magnesium 1.9 11/03/2017 05:41 AM    Phosphorus 2.4 11/03/2017 05:41 AM    Lactic acid 1.7 10/31/2017 03:36 PM       Lab Results   Component Value Date/Time    WBC 20.0 11/03/2017 05:41 AM    HGB 7.9 11/03/2017 05:41 AM    PLATELET 739 94/82/2621 05:41 AM    MCV 87.5 11/03/2017 05:41 AM       Lab Results   Component Value Date/Time    INR 1.1 11/01/2017 04:25 AM    aPTT 29.5 10/31/2017 03:45 PM    AST (SGOT) 56 11/02/2017 05:43 AM    Alk. phosphatase 55 11/02/2017 05:43 AM    Protein, total 5.2 11/02/2017 05:43 AM    Albumin 2.8 11/02/2017 05:43 AM    Globulin 2.4 11/02/2017 05:43 AM       Lab Results   Component Value Date/Time    Iron 33 11/01/2017 02:26 PM    TIBC 219 11/01/2017 02:26 PM    Iron % saturation 15 11/01/2017 02:26 PM    Ferritin 585 11/01/2017 02:26 PM       Lab Results   Component Value Date/Time    TSH 0.74 11/01/2017 04:25 AM        No results found for: PH, PHI, PCO2, PCO2I, PO2, PO2I, HCO3, HCO3I, FIO2, FIO2I    Lab Results   Component Value Date/Time     10/31/2017 03:36 PM    CK-MB Index 2.7 10/31/2017 03:36 PM    Troponin-I, Qt. <0.04 11/01/2017 12:49 PM        No results found for: CULT    No results found for: TOXA1, RPR, HBCM, HBSAG, HAAB, HCAB1, HAAT, G6PD, CRYAC, HIVGT, HIVR, HIV1, HIV12, HIVPC, HIVRPI    Lab Results   Component Value Date/Time     10/31/2017 03:36 PM       No results found for: COLOR, APPRN, SPGRU, BRANT, PROTU, GLUCU, KETU, BILU, BLDU, UROU, NAVEED, LEUKU, WBCU, RBCU, UEPI, BACTU, CASTS, UCRY      Images: no new images this morning    CT abd/pelvis (11/1/17):   1. Questionable papillary mass at the right ureteral orifice. 2. Mild left hydronephrosis and delayed excretion may be due to the proximity of  the Moore catheter balloon to the left ureterovesical junction. 3. Emphysema. 4. Osteoporosis. Age-indeterminate compression fractures L2-L5. 5. 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     IMPRESSION  · Hematuria with ? small bladder mass vs clot  · Atrial fibrillation with RVR  · Chronic hypoxia  · COPD with potential exacerbation  · Hyponatremia  · Thrombocytopenia  · Abnormal chest CT: mediastinal mass and RUL nodule; stable since 2012  · RA  · HTN  · Hx of prostate cancer: currently treated with Lupron    PLAN  · Urology following. Continue CBI. Stable currnetly and no plans for cystoscopy/fulguration today; will consider if clots/bleeding worsen. · Continue gentle NS IVFs; watch Na  · Hematology following; holding MTX for now. Watch Hgb and plt. Transfuse 1 unit PRBCs today. · Cardiology has signed. Remain off dilt drip on po Cardizem 30mg TID. · Continue Brovana/Pulmicort nebs is substitution for home Symbicort. Continue Singulair and Theophylline (levels okay at this time). Continue Duonebs. · Continue O2 to keep sats > 90%; baseline is 2.5L  · Weaning steroids: currently on Prednisone 40mg daily  · Continue empiric ceftriaxone per primary team  · Resume cardiac diet  · GI prophylaxis: Protonix (on at baseline)  · DVT prophylaxis: SCDs    Discussed with nursing and Urology. Addendum 9:15am: patient back in RVR with HR 170s-200s; cardiology aware and has reinitiated dilt drip.     Mechelle Mosqueda

## 2017-11-03 NOTE — PROGRESS NOTES
[facility]  Medical Oncology at [address] [place] [address]  [phone]    Hematology / Oncology Follow-up    Reason for Visit:   Meir Chisholm is a 80 y.o. male who is seen for follow-up of thrombocytopenia, hematuria. Interval History:   Hematuria persists, but lessening some. He reports problems with the catheter again overnight. Denies significant pain at this time. Fatigue persists. Medications reviewed in the EMR. No Known Allergies     Review of Systems: A 6-point review of systems was obtained, negative except as reviewed in the HPI. Physical Exam:     Visit Vitals    /50    Pulse 88    Temp 98.3 °F (36.8 °C)    Resp 14    Ht 5' 10\" (1.778 m)    Wt 150 lb 12.7 oz (68.4 kg)    SpO2 100%    BMI 21.64 kg/m2     General: No distress, elderly and frail appearing  Eyes: Anicteric sclerae  HENT: Atraumatic  Neck: Supple  Respiratory: Normal respiratory effort, on O2 by nc  CV: No peripheral edema  GI: Soft, nontender, nondistended, no masses, no hepatomegaly, no splenomegaly  : jha catheter with CBI, hematuria noted  Skin: ecchymoses bilateral upper extremity; scattered LE; no rash or petechiae. Normal temperature, fair turgor, and texture. Psych: Alert, oriented, appropriate affect, normal judgment/insight      Results:     Lab Results   Component Value Date/Time    WBC 20.0 11/03/2017 05:41 AM    HGB 7.9 11/03/2017 05:41 AM    HCT 23.1 11/03/2017 05:41 AM    PLATELET 045 11/21/2696 05:41 AM    MCV 87.5 11/03/2017 05:41 AM    ABS.  NEUTROPHILS 9.2 11/01/2017 12:49 PM     Lab Results   Component Value Date/Time    Sodium 135 11/03/2017 05:41 AM    Potassium 4.0 11/03/2017 05:41 AM    Chloride 99 11/03/2017 05:41 AM    CO2 28 11/03/2017 05:41 AM    Glucose 138 11/03/2017 05:41 AM    BUN 19 11/03/2017 05:41 AM    Creatinine 0.73 11/03/2017 05:41 AM    GFR est AA >60 11/03/2017 05:41 AM    GFR est non-AA >60 11/03/2017 05:41 AM    Calcium 9.3 11/03/2017 05:41 AM    Creatinine (POC) 0.9 06/26/2013 12:04 PM     Lab Results   Component Value Date/Time    Bilirubin, total 0.3 11/02/2017 05:43 AM    ALT (SGPT) 26 11/02/2017 05:43 AM    AST (SGOT) 56 11/02/2017 05:43 AM    Alk. phosphatase 55 11/02/2017 05:43 AM    Protein, total 5.2 11/02/2017 05:43 AM    Albumin 2.8 11/02/2017 05:43 AM    Globulin 2.4 11/02/2017 05:43 AM     Lab Results   Component Value Date/Time    Reticulocyte count 2.2 11/01/2017 12:49 PM    Iron % saturation 15 11/01/2017 02:26 PM    TIBC 219 11/01/2017 02:26 PM    Ferritin 585 11/01/2017 02:26 PM    Vitamin B12 671 11/01/2017 02:26 PM    Folate 17.5 11/01/2017 02:26 PM    Haptoglobin 152 11/01/2017 02:26 PM     11/01/2017 02:26 PM    TSH 0.74 11/01/2017 04:25 AM     Lab Results   Component Value Date/Time    INR 1.1 11/01/2017 04:25 AM    aPTT 29.5 10/31/2017 03:45 PM    D-dimer 1.20 10/31/2017 03:45 PM    Fibrinogen 390 10/31/2017 08:47 PM       10/31/2017 Peripheral smear  PERIPHERAL SMEAR   (NOTE)   Comment:   Mild normocytic normochromic anemia. Mild leukocytosis with   neutrophilia.  Marked lymphopenia. Mild thrombocytopenia, few   platelet clumps noted. Slide reviewed by Dr. Estrada Jules on 11/1/17. OMN. 10/31/2017 XR CHEST   IMPRESSION: Emphysema. Hiatal hernia. CT would be more sensitive and specific  for evaluating for change and following the patient's known pulmonary nodule and  mediastinal mass. 10/31/2017 CT CHEST W CONT  IMPRESSION:  Stable mediastinal mass and right apical solitary 4 mm nodule. 3 new (compared to 2013) compression fractures as described, but age  indeterminate. Known osteoporosis. Incidental emphysema. 11/1/2017 CT ABD PELV W CONT  1. Questionable papillary mass at the right ureteral orifice. 2. Mild left hydronephrosis and delayed excretion may be due to the proximity of  the Moore catheter balloon to the left ureterovesical junction. 3. Emphysema. 4. Osteoporosis. Age-indeterminate compression fractures L2-L5.   5. 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 Assessment and Recommendations: 1. Thrombocytopenia  Resolved now. This was likely secondary to bone marrow suppression from MTX and consumption related to ongoing bleeding. Labwork otherwise unremarkable. Continue to hold MTX and monitor CBC. No indication for transfusion at this time, but would consider if <50 while actively bleeding. Jossie Denise from hematology perspective to proceed with cystoscopy. 2. Anemia, normocytic  Likely multifactorial, as above, with bone marrow suppression from MTX and now significant blood loss from hematuria. A few additional labs pending, but unlikely to yield additional information. Continue to monitor and transfuse for Hgb < 8 due to cardiac hx      3. Hematuria  Urology following. CT with questionable papillary mass at right UO, though this may simply be a clot. While the thrombocytopenia may have contributed to his bleeding, I don't think it was the sole cause, as spontaneous bleeding would be very unusual with platelets of 90A. Now that PLT >100k, ok from heme perspective if he needs cystoscopy, TURBT, spinal anesthesia, etc.  Discussed with Dr. Valentín Mcneal (urology) and with anesthesia. 4.Leukocytosis  Secondary to steroids. Monitor. 5.Afib  Cardio following. No anticoagulation in setting of hematuria    6. Mediastinal mass/ RUL nodule  Noted on CT Scan; appears chronic and stable    7. Compression fx (T7,T9 and L2)  Noted on CT scan; new since 2013    8. ? Pancreatic mass  Likely an IPMN based on CT scan. Depending on goals of care moving forward, this could potentially be evaluated by GI as an outpatient. 9. Hx Prostate Cancer  Follows with Dr James Yee; on intermittent ADT    10. RA  Follows with Dr Elodia Lopez, but not seen by their office in about 1.5 years; receives MTX every Wed; last dose 10/25/17.   MTX now on hold due to plt count    11. Debility  PT/OT consulted    Over the weekend my colleague is covering and available if needed. Please call with any questions. I will return on Monday.       Signed By: Darcy Joseph MD     November 3, 2017

## 2017-11-04 LAB
ABO + RH BLD: NORMAL
ANION GAP SERPL CALC-SCNC: 7 MMOL/L (ref 5–15)
BLD PROD TYP BPU: NORMAL
BLOOD GROUP ANTIBODIES SERPL: NORMAL
BNP SERPL-MCNC: 1300 PG/ML (ref 0–450)
BPU ID: NORMAL
BUN SERPL-MCNC: 21 MG/DL (ref 6–20)
BUN/CREAT SERPL: 28 (ref 12–20)
CALCIUM SERPL-MCNC: 9 MG/DL (ref 8.5–10.1)
CHLORIDE SERPL-SCNC: 97 MMOL/L (ref 97–108)
CO2 SERPL-SCNC: 29 MMOL/L (ref 21–32)
CREAT SERPL-MCNC: 0.74 MG/DL (ref 0.7–1.3)
CROSSMATCH RESULT,%XM: NORMAL
ERYTHROCYTE [DISTWIDTH] IN BLOOD BY AUTOMATED COUNT: 15.1 % (ref 11.5–14.5)
GLUCOSE SERPL-MCNC: 115 MG/DL (ref 65–100)
HCT VFR BLD AUTO: 24.7 % (ref 36.6–50.3)
HGB BLD-MCNC: 8.3 G/DL (ref 12.1–17)
MCH RBC QN AUTO: 28.8 PG (ref 26–34)
MCHC RBC AUTO-ENTMCNC: 33.6 G/DL (ref 30–36.5)
MCV RBC AUTO: 85.8 FL (ref 80–99)
PLATELET # BLD AUTO: 188 K/UL (ref 150–400)
POTASSIUM SERPL-SCNC: 4.5 MMOL/L (ref 3.5–5.1)
RBC # BLD AUTO: 2.88 M/UL (ref 4.1–5.7)
SODIUM SERPL-SCNC: 133 MMOL/L (ref 136–145)
SPECIMEN EXP DATE BLD: NORMAL
STATUS OF UNIT,%ST: NORMAL
UNIT DIVISION, %UDIV: 0
WBC # BLD AUTO: 18.9 K/UL (ref 4.1–11.1)

## 2017-11-04 PROCEDURE — 74011250637 HC RX REV CODE- 250/637: Performed by: INTERNAL MEDICINE

## 2017-11-04 PROCEDURE — 74011000250 HC RX REV CODE- 250: Performed by: INTERNAL MEDICINE

## 2017-11-04 PROCEDURE — 74011250637 HC RX REV CODE- 250/637: Performed by: UROLOGY

## 2017-11-04 PROCEDURE — 74011000258 HC RX REV CODE- 258: Performed by: NURSE PRACTITIONER

## 2017-11-04 PROCEDURE — 74011636637 HC RX REV CODE- 636/637: Performed by: INTERNAL MEDICINE

## 2017-11-04 PROCEDURE — 74011000258 HC RX REV CODE- 258: Performed by: INTERNAL MEDICINE

## 2017-11-04 PROCEDURE — 36415 COLL VENOUS BLD VENIPUNCTURE: CPT | Performed by: INTERNAL MEDICINE

## 2017-11-04 PROCEDURE — 74011250636 HC RX REV CODE- 250/636: Performed by: INTERNAL MEDICINE

## 2017-11-04 PROCEDURE — 85027 COMPLETE CBC AUTOMATED: CPT | Performed by: INTERNAL MEDICINE

## 2017-11-04 PROCEDURE — 83880 ASSAY OF NATRIURETIC PEPTIDE: CPT | Performed by: INTERNAL MEDICINE

## 2017-11-04 PROCEDURE — 65660000000 HC RM CCU STEPDOWN

## 2017-11-04 PROCEDURE — 74011250637 HC RX REV CODE- 250/637: Performed by: NURSE PRACTITIONER

## 2017-11-04 PROCEDURE — 74011000250 HC RX REV CODE- 250: Performed by: NURSE PRACTITIONER

## 2017-11-04 PROCEDURE — 74011000250 HC RX REV CODE- 250: Performed by: PHYSICIAN ASSISTANT

## 2017-11-04 PROCEDURE — 77010033678 HC OXYGEN DAILY

## 2017-11-04 PROCEDURE — 80048 BASIC METABOLIC PNL TOTAL CA: CPT | Performed by: INTERNAL MEDICINE

## 2017-11-04 PROCEDURE — 94640 AIRWAY INHALATION TREATMENT: CPT

## 2017-11-04 RX ORDER — GUAIFENESIN 600 MG/1
600 TABLET, EXTENDED RELEASE ORAL EVERY 12 HOURS
Status: DISCONTINUED | OUTPATIENT
Start: 2017-11-04 | End: 2017-11-08 | Stop reason: HOSPADM

## 2017-11-04 RX ORDER — LEVALBUTEROL INHALATION SOLUTION 1.25 MG/3ML
1.25 SOLUTION RESPIRATORY (INHALATION)
Status: DISCONTINUED | OUTPATIENT
Start: 2017-11-04 | End: 2017-11-08 | Stop reason: HOSPADM

## 2017-11-04 RX ORDER — IPRATROPIUM BROMIDE 0.5 MG/2.5ML
0.5 SOLUTION RESPIRATORY (INHALATION)
Status: DISCONTINUED | OUTPATIENT
Start: 2017-11-04 | End: 2017-11-08 | Stop reason: HOSPADM

## 2017-11-04 RX ORDER — FACIAL-BODY WIPES
10 EACH TOPICAL ONCE
Status: COMPLETED | OUTPATIENT
Start: 2017-11-04 | End: 2017-11-04

## 2017-11-04 RX ADMIN — BUSPIRONE HYDROCHLORIDE 5 MG: 5 TABLET ORAL at 08:33

## 2017-11-04 RX ADMIN — CEFTRIAXONE SODIUM 1 G: 1 INJECTION, POWDER, FOR SOLUTION INTRAMUSCULAR; INTRAVENOUS at 20:25

## 2017-11-04 RX ADMIN — DILTIAZEM HYDROCHLORIDE 60 MG: 60 TABLET, FILM COATED ORAL at 17:24

## 2017-11-04 RX ADMIN — ARFORMOTEROL TARTRATE 15 MCG: 15 SOLUTION RESPIRATORY (INHALATION) at 20:00

## 2017-11-04 RX ADMIN — GUAIFENESIN 600 MG: 600 TABLET, EXTENDED RELEASE ORAL at 11:27

## 2017-11-04 RX ADMIN — DILTIAZEM HYDROCHLORIDE 60 MG: 60 TABLET, FILM COATED ORAL at 08:33

## 2017-11-04 RX ADMIN — SOLIFENACIN SUCCINATE 5 MG: 5 TABLET, FILM COATED ORAL at 17:24

## 2017-11-04 RX ADMIN — PREDNISONE 40 MG: 20 TABLET ORAL at 08:33

## 2017-11-04 RX ADMIN — IPRATROPIUM BROMIDE 0.5 MG: 0.5 SOLUTION RESPIRATORY (INHALATION) at 13:47

## 2017-11-04 RX ADMIN — POLYETHYLENE GLYCOL 3350 17 G: 17 POWDER, FOR SOLUTION ORAL at 08:33

## 2017-11-04 RX ADMIN — BUDESONIDE 500 MCG: 0.5 INHALANT RESPIRATORY (INHALATION) at 20:00

## 2017-11-04 RX ADMIN — THEOPHYLLINE ANHYDROUS 200 MG: 200 CAPSULE, EXTENDED RELEASE ORAL at 20:23

## 2017-11-04 RX ADMIN — IPRATROPIUM BROMIDE 0.5 MG: 0.5 SOLUTION RESPIRATORY (INHALATION) at 20:01

## 2017-11-04 RX ADMIN — FOLIC ACID 1 MG: 1 TABLET ORAL at 17:24

## 2017-11-04 RX ADMIN — BUSPIRONE HYDROCHLORIDE 5 MG: 5 TABLET ORAL at 20:23

## 2017-11-04 RX ADMIN — MONTELUKAST SODIUM 10 MG: 10 TABLET, FILM COATED ORAL at 17:24

## 2017-11-04 RX ADMIN — PANTOPRAZOLE SODIUM 40 MG: 40 TABLET, DELAYED RELEASE ORAL at 08:33

## 2017-11-04 RX ADMIN — IPRATROPIUM BROMIDE AND ALBUTEROL SULFATE 3 ML: .5; 3 SOLUTION RESPIRATORY (INHALATION) at 07:41

## 2017-11-04 RX ADMIN — PREDNISONE 40 MG: 20 TABLET ORAL at 17:24

## 2017-11-04 RX ADMIN — BUDESONIDE 500 MCG: 0.5 INHALANT RESPIRATORY (INHALATION) at 08:00

## 2017-11-04 RX ADMIN — LEVALBUTEROL HYDROCHLORIDE 1.25 MG: 1.25 SOLUTION RESPIRATORY (INHALATION) at 14:00

## 2017-11-04 RX ADMIN — Medication 10 ML: at 06:40

## 2017-11-04 RX ADMIN — DILTIAZEM HYDROCHLORIDE 5 MG/HR: 5 INJECTION INTRAVENOUS at 06:40

## 2017-11-04 RX ADMIN — Medication 10 ML: at 14:00

## 2017-11-04 RX ADMIN — THEOPHYLLINE ANHYDROUS 200 MG: 200 CAPSULE, EXTENDED RELEASE ORAL at 08:33

## 2017-11-04 RX ADMIN — GUAIFENESIN 600 MG: 600 TABLET, EXTENDED RELEASE ORAL at 20:24

## 2017-11-04 RX ADMIN — BISACODYL 10 MG: 10 SUPPOSITORY RECTAL at 16:22

## 2017-11-04 RX ADMIN — DILTIAZEM HYDROCHLORIDE 60 MG: 60 TABLET, FILM COATED ORAL at 11:27

## 2017-11-04 RX ADMIN — LEVALBUTEROL HYDROCHLORIDE 1.25 MG: 1.25 SOLUTION RESPIRATORY (INHALATION) at 20:00

## 2017-11-04 RX ADMIN — ARFORMOTEROL TARTRATE 15 MCG: 15 SOLUTION RESPIRATORY (INHALATION) at 07:41

## 2017-11-04 NOTE — PROGRESS NOTES
0700 Bedside and Verbal shift change report given to Candelario Johnson (oncoming nurse) by Jethro Mcintosh (offgoing nurse). Report included the following information SBAR, Kardex, Intake/Output, Recent Results and Cardiac Rhythm NSR, PACs.

## 2017-11-04 NOTE — PROGRESS NOTES
Name: Aubrie Damon: [facility]   : 1935 Admit Date: 10/31/2017   Phone: [phone]  Room: [address] [place] [address]   PCP: Gianna Aggarwal MD  MRN: [id number]   Date: 2017  Code: DNR          Chart and notes reviewed. Data reviewed. I review the patient's current medications in the medical record at each encounter. I have evaluated and examined the patient. Spoke with nursing.     Still on bladder irrigation with pink output  Tachycardia with intermittent afib on cardizem gtt  C/o cough wth very thick mucus, hard to clear  Sob continues near baseline  On nc o2      ROS: no cp f/c or n/v      Current Facility-Administered Medications   Medication Dose Route Frequency    ipratropium (ATROVENT) 0.02 % nebulizer solution 0.5 mg  0.5 mg Nebulization Q6H RT    levalbuterol (XOPENEX) nebulizer soln 1.25 mg/3 mL  1.25 mg Nebulization Q6H RT    levalbuterol (XOPENEX) nebulizer soln 1.25 mg/3 mL  1.25 mg Nebulization Q2H PRN    0.9% sodium chloride infusion 250 mL  250 mL IntraVENous PRN    dilTIAZem (CARDIZEM) 125 mg in dextrose 5% 125 mL infusion  5-15 mg/hr IntraVENous TITRATE    predniSONE (DELTASONE) tablet 40 mg  40 mg Oral BID WITH MEALS    dilTIAZem (CARDIZEM) IR tablet 60 mg  60 mg Oral TIDAC    solifenacin (VESICARE) tablet 5 mg  5 mg Oral DAILY    polyethylene glycol (MIRALAX) packet 17 g  17 g Oral DAILY    lidocaine (XYLOCAINE) 2 % jelly   Mucous Membrane PRN    budesonide (PULMICORT) 500 mcg/2 ml nebulizer suspension  500 mcg Nebulization BID RT    arformoterol (BROVANA) neb solution 15 mcg  15 mcg Nebulization BID RT    sodium chloride (NS) flush 5-10 mL  5-10 mL IntraVENous Q8H    sodium chloride (NS) flush 5-10 mL  5-10 mL IntraVENous PRN    acetaminophen (TYLENOL) tablet 650 mg  650 mg Oral Q4H PRN    ondansetron (ZOFRAN) injection 4 mg  4 mg IntraVENous Q4H PRN    cefTRIAXone (ROCEPHIN) 1 g in 0.9% sodium chloride (MBP/ADV) 50 mL  1 g IntraVENous Q24H    busPIRone (BUSPAR) tablet 5 mg  5 mg Oral BID    folic acid (FOLVITE) tablet 1 mg  1 mg Oral DAILY WITH DINNER    montelukast (SINGULAIR) tablet 10 mg  10 mg Oral DAILY WITH DINNER    pantoprazole (PROTONIX) tablet 40 mg  40 mg Oral ACB    theophylline ER (FRANCISCO-24) capsule 200 mg  200 mg Oral BID         REVIEW OF SYSTEMS   12 point ROS negative except as stated in the HPI. Physical Exam:   Visit Vitals    /55    Pulse 84    Temp 98.2 °F (36.8 °C)    Resp 14    Ht 5' 10\" (1.778 m)    Wt 68.4 kg (150 lb 12.7 oz)    SpO2 100%    BMI 21.64 kg/m2       General:  Alert, cooperative, sob appears stated age. Head:  Normocephalic, without obvious abnormality, atraumatic. Eyes:  Conjunctivae/corneas clear. Nose: Nares normal. Septum midline. Mucosa normal.    Throat: Lips, mucosa, and tongue normal.    Neck: Supple, symmetrical, trachea midline, no adenopathy. Lungs:   Diminished bilaterally; inc effort, some wheeze bilat   Chest wall:  No tenderness or deformity. Heart:  Irregular, rate 38I-914L, no murmur, click, rub or gallop. Abdomen:   Soft, non-tender. Bowel sounds normal. No masses,  No organomegaly. Extremities: Extremities normal, atraumatic, no cyanosis or edema. Pulses: 2+ and symmetric all extremities.    Skin: Scattered ecchymosis, decreased skin turgor normal.    Lymph nodes: Cervical, supraclavicular nodes normal.   Neurologic: Symmetric motor exam, has jerking of legs, almost like myoclonus       Lab Results   Component Value Date/Time    Sodium 133 11/04/2017 04:29 AM    Potassium 4.5 11/04/2017 04:29 AM    Chloride 97 11/04/2017 04:29 AM    CO2 29 11/04/2017 04:29 AM    BUN 21 11/04/2017 04:29 AM    Creatinine 0.74 11/04/2017 04:29 AM    Glucose 115 11/04/2017 04:29 AM    Calcium 9.0 11/04/2017 04:29 AM    Magnesium 1.9 11/03/2017 05:41 AM    Phosphorus 2.4 11/03/2017 05:41 AM    Lactic acid 1.7 10/31/2017 03:36 PM       Lab Results   Component Value Date/Time    WBC 18.9 11/04/2017 04:29 AM    HGB 8.3 11/04/2017 04:29 AM    PLATELET 645 54/24/4162 04:29 AM    MCV 85.8 11/04/2017 04:29 AM       Lab Results   Component Value Date/Time    INR 1.1 11/01/2017 04:25 AM    aPTT 29.5 10/31/2017 03:45 PM    AST (SGOT) 56 11/02/2017 05:43 AM    Alk.  phosphatase 55 11/02/2017 05:43 AM    Protein, total 5.2 11/02/2017 05:43 AM    Albumin 2.8 11/02/2017 05:43 AM    Globulin 2.4 11/02/2017 05:43 AM       Lab Results   Component Value Date/Time    Iron 33 11/01/2017 02:26 PM    TIBC 219 11/01/2017 02:26 PM    Iron % saturation 15 11/01/2017 02:26 PM    Ferritin 585 11/01/2017 02:26 PM       Lab Results   Component Value Date/Time    TSH 0.74 11/01/2017 04:25 AM        No results found for: PH, PHI, PCO2, PCO2I, PO2, PO2I, HCO3, HCO3I, FIO2, FIO2I    Lab Results   Component Value Date/Time     10/31/2017 03:36 PM    CK-MB Index 2.7 10/31/2017 03:36 PM    Troponin-I, Qt. <0.04 11/01/2017 12:49 PM        No results found for: CULT    No results found for: TOXA1, RPR, HBCM, HBSAG, HAAB, HCAB1, HAAT, G6PD, CRYAC, HIVGT, HIVR, HIV1, HIV12, HIVPC, HIVRPI    Lab Results   Component Value Date/Time     10/31/2017 03:36 PM       No results found for: COLOR, APPRN, SPGRU, BRANT, PROTU, GLUCU, KETU, BILU, BLDU, UROU, NAVEED, LEUKU, WBCU, RBCU, UEPI, BACTU, CASTS, UCRY      Images: no new images this morning      IMPRESSION  · Hematuria with ? small bladder mass vs clot  · Atrial fibrillation with RVR  · Chronic hypoxia  · COPD with potential exacerbation  · Hyponatremia improved  · Thrombocytopenia  · Abnormal chest CT: mediastinal mass and RUL nodule; stable since 2012  · RA  · HTN  · Hx of prostate cancer: currently treated with Lupron    PLAN  · Ongoing hematuria - per urology, likely to need cysto this week, will clear from pulm standpoint but is at increased risk for postop resp failure, no intubation if possible  · Chronic hypoxic resp failure, ongoing copd sx and at risk for worsening, will add mucinex, continue bid steroids, if gets worse change abx - on rocephin   · Afib on iv cardizem, worsened by beta agonists and theophylline but need to continue those for now    Discussed with nursing       Jennifer Pham MD

## 2017-11-04 NOTE — PROGRESS NOTES
No pain. On slow CBI with blood tinge only, no clots on run through irrigation.  hgb stable. No urine cx-on Rocephin. REC: NOT operative candidate unless emergent. Continue same.

## 2017-11-04 NOTE — PROGRESS NOTES
Sean Murillo [facility] 79  [address] [facility], [address] [place] [address]  [phone]      Medical Progress Note      NAME: Femi Hercules   :  1935  MRM:  [id number]    Date/Time: 2017         Subjective:     Chief Complaint:      No acute events. Sob feels at baseline per pt. Tired of getting breathing treatments that last for 20 minutes. Denies cp. Still with hematuria with CBI       Objective:       Vitals:       Last 24hrs VS reviewed since prior progress note.  Most recent are:    Visit Vitals    /55    Pulse 84    Temp 98.2 °F (36.8 °C)    Resp 14    Ht 5' 10\" (1.778 m)    Wt 68.4 kg (150 lb 12.7 oz)    SpO2 100%    BMI 21.64 kg/m2     SpO2 Readings from Last 6 Encounters:   17 100%    O2 Flow Rate (L/min): 3 l/min       Intake/Output Summary (Last 24 hours) at 17 0858  Last data filed at 17   Gross per 24 hour   Intake         42836.65 ml   Output            54586 ml   Net            -8.35 ml        Exam:     Physical Exam:    Gen:  elderly, chronically ill appearing, NAD  HEENT:  Pink conjunctivae, PERRL, hearing intact to voice, moist mucous membranes  Neck:  Supple, without masses, thyroid non-tender  Resp:  No accessory muscle use, poor air movement   Card:  Irregular, no murmurs, normal S1, S2 without thrills, bruits or peripheral edema  Abd:  Soft, non-tender, non-distended, normoactive bowel sounds are present  Musc:  No cyanosis or clubbing  Skin:  Multiple ecchymosis on arms  Neuro:  Cranial nerves 3-12 are grossly intact, follows commands appropriately  Psych:  Good insight, oriented to person, place and time, alert    Medications Reviewed: (see below)    Lab Data Reviewed: (see below)    ______________________________________________________________________    Medications:     Current Facility-Administered Medications   Medication Dose Route Frequency    ipratropium (ATROVENT) 0.02 % nebulizer solution 0.5 mg  0.5 mg Nebulization Q6H RT    levalbuterol (XOPENEX) nebulizer soln 1.25 mg/3 mL  1.25 mg Nebulization Q6H RT    levalbuterol (XOPENEX) nebulizer soln 1.25 mg/3 mL  1.25 mg Nebulization Q2H PRN    0.9% sodium chloride infusion 250 mL  250 mL IntraVENous PRN    dilTIAZem (CARDIZEM) 125 mg in dextrose 5% 125 mL infusion  5-15 mg/hr IntraVENous TITRATE    predniSONE (DELTASONE) tablet 40 mg  40 mg Oral BID WITH MEALS    dilTIAZem (CARDIZEM) IR tablet 60 mg  60 mg Oral TIDAC    solifenacin (VESICARE) tablet 5 mg  5 mg Oral DAILY    polyethylene glycol (MIRALAX) packet 17 g  17 g Oral DAILY    lidocaine (XYLOCAINE) 2 % jelly   Mucous Membrane PRN    budesonide (PULMICORT) 500 mcg/2 ml nebulizer suspension  500 mcg Nebulization BID RT    arformoterol (BROVANA) neb solution 15 mcg  15 mcg Nebulization BID RT    sodium chloride (NS) flush 5-10 mL  5-10 mL IntraVENous Q8H    sodium chloride (NS) flush 5-10 mL  5-10 mL IntraVENous PRN    acetaminophen (TYLENOL) tablet 650 mg  650 mg Oral Q4H PRN    ondansetron (ZOFRAN) injection 4 mg  4 mg IntraVENous Q4H PRN    cefTRIAXone (ROCEPHIN) 1 g in 0.9% sodium chloride (MBP/ADV) 50 mL  1 g IntraVENous Q24H    busPIRone (BUSPAR) tablet 5 mg  5 mg Oral BID    folic acid (FOLVITE) tablet 1 mg  1 mg Oral DAILY WITH DINNER    montelukast (SINGULAIR) tablet 10 mg  10 mg Oral DAILY WITH DINNER    pantoprazole (PROTONIX) tablet 40 mg  40 mg Oral ACB    theophylline ER (FRANCISCO-24) capsule 200 mg  200 mg Oral BID          Lab Review:     Recent Labs      11/04/17 0429 11/03/17   0541  11/02/17   0543   WBC  18.9*  20.0*  20.5*   HGB  8.3*  7.9*  8.1*   HCT  24.7*  23.1*  23.5*   PLT  188  162  136*     Recent Labs      11/04/17 0429 11/03/17   0541  11/02/17   0543   NA  133*  135*  130*   K  4.5  4.0  4.0   CL  97  99  95*   CO2  29  28  27   GLU  115*  138*  157*   BUN  21*  19  21*   CREA  0.74  0.73  0.96   CA  9.0  9.3  9.1   MG   --   1.9  1.8   PHOS   --   2.4*  3.0   ALB   -- --   2.8*   TBILI   --    --   0.3   SGOT   --    --   56*   ALT   --    --   26     No results found for: GLUCPOC       Assessment / Plan:     Gross hematuria: has chronic indwelling catheter. Also hx of prostate CA. CT concerning for papillary mass of R ureter. Cont bladder irrigation. Urology following, may need cystoscopy     Acute blood loss anemia: due to above. Will monitor Hgb closely, transfuse prn    Complicated UTI related to an indwelling catheter: no urine culture sent. Will have to treat empirically with CTX for 7 days    Afib RVR: new onset, no prior hx of afib. Echo with EF 55-60%. Rate elevated yesterday, restarted on dilt gtt. HR intermittently elevated with activity will change albuterol to xopenex. Cardiology following    COPD exacerbation/chronic resp failure/hypoxia: on chronic 2.5L O2. Will cont duo nebs (changes as above), improved while on IV steroids and now on PO. Pulm following    Mediastinal mass: seems chronic. Defer to pulm    Thrombocytopenia/ecchymosis: could be from MTX, underlying cancer, acute illness. Holding MTX, NSAIDS. Hematology following    Leukocytosis: stress response, steroids. Will monitor    Hyponatremia: likely due to volume depletion. Improving, Cont IVF. Monitor BMP    HTN: no BB due to lung dz. dilt started    RA: holding MTX/bactrim    Multiple spine compression fracture: incidental findings on CT of T2, T9, L2. Chronically debilitated at home. Cont pain control.   Consult PT/OT    Total time spent with patient: 39 [address] discussed with: Patient, nursing    Discussed:  Care Plan    Prophylaxis:  SCD's    Disposition:  SNF/LTC vs HH           ___________________________________________________    Attending Physician: Cyndra Fleischer, MD

## 2017-11-04 NOTE — PROGRESS NOTES
Syeda Francis MD    [address] [place], [address] [facility]    Office [phone],Q [phone]  Pager [phone]    2017     Admit Date: 10/31/2017    Admit Diagnosis: Gross hematuria; Hematuria    NAME: Altagracia Farooq   :  1935     MRN: [id number]     Assessment/Plan:    PAF RVR triggered by illness, anemia, end stage COPD      - cont rate control - cardizem gtt-on 5mg/hr - can uptitrate if needed -Also on PO cardizem       -Dig if needed       - No anticoagulation 2/2 hematuria /anemia  Anemia  Hematuria- per urology   HTN-   End stage COPD - home O2/steroids     Hyponatremia- resolved   DNR        Discussed with wife/daughter-they reiterate  conservative management which has been discussed with them before. Please do not hesitate to contact us with questions or concerns. See note below for details. Syeda Francis MD      Subjective:  Complains of dyspnea.   \"Feeling okay\"    ROS:  (bold if positive, if negative)          Medications before admission:     Current Facility-Administered Medications   Medication Dose Route Frequency    0.9% sodium chloride infusion 250 mL  250 mL IntraVENous PRN    dilTIAZem (CARDIZEM) 125 mg in dextrose 5% 125 mL infusion  5-15 mg/hr IntraVENous TITRATE    predniSONE (DELTASONE) tablet 40 mg  40 mg Oral BID WITH MEALS    dilTIAZem (CARDIZEM) IR tablet 60 mg  60 mg Oral TIDAC    solifenacin (VESICARE) tablet 5 mg  5 mg Oral DAILY    polyethylene glycol (MIRALAX) packet 17 g  17 g Oral DAILY    lidocaine (XYLOCAINE) 2 % jelly   Mucous Membrane PRN    albuterol-ipratropium (DUO-NEB) 2.5 MG-0.5 MG/3 ML  3 mL Nebulization Q6H RT    budesonide (PULMICORT) 500 mcg/2 ml nebulizer suspension  500 mcg Nebulization BID RT    arformoterol (BROVANA) neb solution 15 mcg  15 mcg Nebulization BID RT    sodium chloride (NS) flush 5-10 mL  5-10 mL IntraVENous Q8H    sodium chloride (NS) flush 5-10 mL  5-10 mL IntraVENous PRN    acetaminophen (TYLENOL) tablet 650 mg  650 mg Oral Q4H PRN    ondansetron (ZOFRAN) injection 4 mg  4 mg IntraVENous Q4H PRN    albuterol (PROVENTIL VENTOLIN) nebulizer solution 2.5 mg  2.5 mg Nebulization Q2H PRN    cefTRIAXone (ROCEPHIN) 1 g in 0.9% sodium chloride (MBP/ADV) 50 mL  1 g IntraVENous Q24H    busPIRone (BUSPAR) tablet 5 mg  5 mg Oral BID    folic acid (FOLVITE) tablet 1 mg  1 mg Oral DAILY WITH DINNER    montelukast (SINGULAIR) tablet 10 mg  10 mg Oral DAILY WITH DINNER    pantoprazole (PROTONIX) tablet 40 mg  40 mg Oral ACB    theophylline ER (FRANCISCO-24) capsule 200 mg  200 mg Oral BID       Physical Exam:  Visit Vitals    /55    Pulse 84    Temp 98.2 °F (36.8 °C)    Resp 14    Ht 5' 10\" (1.778 m)    Wt 150 lb 12.7 oz (68.4 kg)    SpO2 100%    BMI 21.64 kg/m2    O2 Flow Rate (L/min): 3 l/min O2 Device: Nasal cannula      Telemetry:     Gen: Well-developed, elderly  Neck: Unable to appreciate JVD, No Carotid Bruit,   Resp: No accessory muscle use, bilateral rhonchi present  Card: Irregular rate,Rythm,Normal S1, S2, 2/6 systolic murmur present   Abd:  Soft, \ non-distended,   LE: Trace edema    EKG:        Cxray:    LABS:        Lab Results   Component Value Date/Time    WBC 18.9 11/04/2017 04:29 AM    HGB 8.3 11/04/2017 04:29 AM    HCT 24.7 11/04/2017 04:29 AM    PLATELET 281 03/37/4856 04:29 AM    MCV 85.8 11/04/2017 04:29 AM     Lab Results   Component Value Date/Time    Sodium 133 11/04/2017 04:29 AM    Potassium 4.5 11/04/2017 04:29 AM    Chloride 97 11/04/2017 04:29 AM    CO2 29 11/04/2017 04:29 AM    Anion gap 7 11/04/2017 04:29 AM    Glucose 115 11/04/2017 04:29 AM    BUN 21 11/04/2017 04:29 AM    Creatinine 0.74 11/04/2017 04:29 AM    BUN/Creatinine ratio 28 11/04/2017 04:29 AM    GFR est AA >60 11/04/2017 04:29 AM    GFR est non-AA >60 11/04/2017 04:29 AM    Calcium 9.0 11/04/2017 04:29 AM     Lab Results   Component Value Date/Time     10/31/2017 03:36 PM CK-MB Index 2.7 10/31/2017 03:36 PM    Troponin-I, Qt. <0.04 11/01/2017 12:49 PM     Lab Results   Component Value Date/Time    aPTT 29.5 10/31/2017 03:45 PM     Lab Results   Component Value Date/Time    INR 1.1 11/01/2017 04:25 AM    INR 1.0 10/31/2017 03:45 PM    Prothrombin time 11.3 11/01/2017 04:25 AM    Prothrombin time 10.5 10/31/2017 03:45 PM     No results found for: BNP, BNPP, XBNPT    Care Plan discussed with:   Kim Nixon MD

## 2017-11-04 NOTE — PROGRESS NOTES
Shift Summary    0719-Bedside and Verbal shift change report given to BENNY Murillo, RN (oncoming nurse) by OTILIO Sinclair, GILBERTO (offgoing nurse). Report included the following information SBAR, Kardex, MAR and Cardiac Rhythm NSR c PACs and PVCs. 1300-Manually irrigated with 60 mL of sterile water. 1513-Patient c/o discomfort at catheter site. Irrigated with 60 mL of sterile water, no clots noted. Patient expresses relief. Patient also requesting suppository for constipation. Amairani BRANCH, one time order for bisocodyl suppository RE 10 mg.     1905-Bedside and Verbal shift change report given to RAFAEL Dunn (oncoming nurse) by Abbi Cunningham. Dina Murillo, GILBERTO (offgoing nurse). Report included the following information SBAR, MAR, Recent Results and Cardiac Rhythm Sinus Tach.     1922-Patient calling out in pain. Manually irrigated twice directly to CBI connector. Moderate amount of clots expelled. Patient expresses relief.

## 2017-11-05 LAB
ANION GAP SERPL CALC-SCNC: 4 MMOL/L (ref 5–15)
BUN SERPL-MCNC: 17 MG/DL (ref 6–20)
BUN/CREAT SERPL: 23 (ref 12–20)
CALCIUM SERPL-MCNC: 9 MG/DL (ref 8.5–10.1)
CHLORIDE SERPL-SCNC: 93 MMOL/L (ref 97–108)
CO2 SERPL-SCNC: 31 MMOL/L (ref 21–32)
CREAT SERPL-MCNC: 0.75 MG/DL (ref 0.7–1.3)
ERYTHROCYTE [DISTWIDTH] IN BLOOD BY AUTOMATED COUNT: 15.1 % (ref 11.5–14.5)
GLUCOSE SERPL-MCNC: 114 MG/DL (ref 65–100)
HCT VFR BLD AUTO: 26.4 % (ref 36.6–50.3)
HGB BLD-MCNC: 8.8 G/DL (ref 12.1–17)
MCH RBC QN AUTO: 29.1 PG (ref 26–34)
MCHC RBC AUTO-ENTMCNC: 33.3 G/DL (ref 30–36.5)
MCV RBC AUTO: 87.4 FL (ref 80–99)
PLATELET # BLD AUTO: 196 K/UL (ref 150–400)
POTASSIUM SERPL-SCNC: 4.2 MMOL/L (ref 3.5–5.1)
RBC # BLD AUTO: 3.02 M/UL (ref 4.1–5.7)
SODIUM SERPL-SCNC: 128 MMOL/L (ref 136–145)
WBC # BLD AUTO: 17.4 K/UL (ref 4.1–11.1)

## 2017-11-05 PROCEDURE — 74011000258 HC RX REV CODE- 258: Performed by: INTERNAL MEDICINE

## 2017-11-05 PROCEDURE — 74011250637 HC RX REV CODE- 250/637: Performed by: INTERNAL MEDICINE

## 2017-11-05 PROCEDURE — 74011636637 HC RX REV CODE- 636/637: Performed by: INTERNAL MEDICINE

## 2017-11-05 PROCEDURE — 77010033678 HC OXYGEN DAILY

## 2017-11-05 PROCEDURE — 85027 COMPLETE CBC AUTOMATED: CPT | Performed by: INTERNAL MEDICINE

## 2017-11-05 PROCEDURE — 65660000000 HC RM CCU STEPDOWN

## 2017-11-05 PROCEDURE — 74011000250 HC RX REV CODE- 250: Performed by: NURSE PRACTITIONER

## 2017-11-05 PROCEDURE — 74011000250 HC RX REV CODE- 250: Performed by: INTERNAL MEDICINE

## 2017-11-05 PROCEDURE — 94640 AIRWAY INHALATION TREATMENT: CPT

## 2017-11-05 PROCEDURE — 80048 BASIC METABOLIC PNL TOTAL CA: CPT | Performed by: INTERNAL MEDICINE

## 2017-11-05 PROCEDURE — 74011000258 HC RX REV CODE- 258: Performed by: NURSE PRACTITIONER

## 2017-11-05 PROCEDURE — 74011250636 HC RX REV CODE- 250/636: Performed by: INTERNAL MEDICINE

## 2017-11-05 PROCEDURE — 74011250637 HC RX REV CODE- 250/637: Performed by: UROLOGY

## 2017-11-05 PROCEDURE — 36415 COLL VENOUS BLD VENIPUNCTURE: CPT | Performed by: INTERNAL MEDICINE

## 2017-11-05 PROCEDURE — 74011000250 HC RX REV CODE- 250: Performed by: PHYSICIAN ASSISTANT

## 2017-11-05 PROCEDURE — 74011250637 HC RX REV CODE- 250/637: Performed by: NURSE PRACTITIONER

## 2017-11-05 RX ADMIN — PANTOPRAZOLE SODIUM 40 MG: 40 TABLET, DELAYED RELEASE ORAL at 08:49

## 2017-11-05 RX ADMIN — IPRATROPIUM BROMIDE 0.5 MG: 0.5 SOLUTION RESPIRATORY (INHALATION) at 19:43

## 2017-11-05 RX ADMIN — BUDESONIDE 500 MCG: 0.5 INHALANT RESPIRATORY (INHALATION) at 19:42

## 2017-11-05 RX ADMIN — MONTELUKAST SODIUM 10 MG: 10 TABLET, FILM COATED ORAL at 17:13

## 2017-11-05 RX ADMIN — PREDNISONE 40 MG: 20 TABLET ORAL at 17:13

## 2017-11-05 RX ADMIN — IPRATROPIUM BROMIDE 0.5 MG: 0.5 SOLUTION RESPIRATORY (INHALATION) at 07:57

## 2017-11-05 RX ADMIN — BUSPIRONE HYDROCHLORIDE 5 MG: 5 TABLET ORAL at 08:49

## 2017-11-05 RX ADMIN — PREDNISONE 40 MG: 20 TABLET ORAL at 08:49

## 2017-11-05 RX ADMIN — LEVALBUTEROL 1.25 MG: 1.25 SOLUTION RESPIRATORY (INHALATION) at 08:01

## 2017-11-05 RX ADMIN — THEOPHYLLINE ANHYDROUS 200 MG: 200 CAPSULE, EXTENDED RELEASE ORAL at 20:53

## 2017-11-05 RX ADMIN — IPRATROPIUM BROMIDE 0.5 MG: 0.5 SOLUTION RESPIRATORY (INHALATION) at 14:26

## 2017-11-05 RX ADMIN — Medication 10 ML: at 20:53

## 2017-11-05 RX ADMIN — POLYETHYLENE GLYCOL 3350 17 G: 17 POWDER, FOR SOLUTION ORAL at 08:49

## 2017-11-05 RX ADMIN — BUSPIRONE HYDROCHLORIDE 5 MG: 5 TABLET ORAL at 20:53

## 2017-11-05 RX ADMIN — DILTIAZEM HYDROCHLORIDE 60 MG: 60 TABLET, FILM COATED ORAL at 08:49

## 2017-11-05 RX ADMIN — ACETAMINOPHEN 650 MG: 325 TABLET ORAL at 18:37

## 2017-11-05 RX ADMIN — THEOPHYLLINE ANHYDROUS 200 MG: 200 CAPSULE, EXTENDED RELEASE ORAL at 08:49

## 2017-11-05 RX ADMIN — FOLIC ACID 1 MG: 1 TABLET ORAL at 17:13

## 2017-11-05 RX ADMIN — BUDESONIDE 500 MCG: 0.5 INHALANT RESPIRATORY (INHALATION) at 08:00

## 2017-11-05 RX ADMIN — LEVALBUTEROL 1.25 MG: 1.25 SOLUTION RESPIRATORY (INHALATION) at 14:26

## 2017-11-05 RX ADMIN — DILTIAZEM HYDROCHLORIDE 90 MG: 30 TABLET, FILM COATED ORAL at 12:08

## 2017-11-05 RX ADMIN — DILTIAZEM HYDROCHLORIDE 90 MG: 30 TABLET, FILM COATED ORAL at 17:14

## 2017-11-05 RX ADMIN — Medication 10 ML: at 13:57

## 2017-11-05 RX ADMIN — SOLIFENACIN SUCCINATE 5 MG: 5 TABLET, FILM COATED ORAL at 17:14

## 2017-11-05 RX ADMIN — GUAIFENESIN 600 MG: 600 TABLET, EXTENDED RELEASE ORAL at 08:49

## 2017-11-05 RX ADMIN — DILTIAZEM HYDROCHLORIDE 5 MG/HR: 5 INJECTION INTRAVENOUS at 08:08

## 2017-11-05 RX ADMIN — GUAIFENESIN 600 MG: 600 TABLET, EXTENDED RELEASE ORAL at 20:53

## 2017-11-05 RX ADMIN — LEVALBUTEROL HYDROCHLORIDE 1.25 MG: 1.25 SOLUTION RESPIRATORY (INHALATION) at 19:43

## 2017-11-05 RX ADMIN — ARFORMOTEROL TARTRATE 15 MCG: 15 SOLUTION RESPIRATORY (INHALATION) at 07:57

## 2017-11-05 RX ADMIN — DILTIAZEM HYDROCHLORIDE 5 MG/HR: 5 INJECTION INTRAVENOUS at 18:36

## 2017-11-05 RX ADMIN — CEFTRIAXONE SODIUM 1 G: 1 INJECTION, POWDER, FOR SOLUTION INTRAMUSCULAR; INTRAVENOUS at 20:52

## 2017-11-05 RX ADMIN — ARFORMOTEROL TARTRATE 15 MCG: 15 SOLUTION RESPIRATORY (INHALATION) at 19:42

## 2017-11-05 NOTE — PROGRESS NOTES
Corona Patrick MD    [address] [place] [place], [address] [facility]    Office [phone]  Pager [phone]    2017     Admit Date: 10/31/2017    Admit Diagnosis: Gross hematuria; Hematuria    NAME: Angela Kimbrough   :  1935     MRN: [id number]     Assessment/Plan:    PAF RVR triggered by illness, anemia, end stage COPD      - cont rate control - Wean  cardizem gtt-on 5mg/hr -  -Also on PO cardizem . Agree with increasing dose      -Dig if needed       - No anticoagulation 2/2 hematuria /anemia  Anemia  Hematuria- per urology   HTN-   End stage COPD  Per pulm   Hyponatremia-   DNR        Discussed with wife/daughter  - they reiterate  conservative management which has been discussed with them before. Please do not hesitate to contact us with questions or concerns. See note below for details. Corona aPtrick MD      Subjective:  Complains of dyspnea. \"Doing OK\".  Denies CP    ROS:  (bold if positive, if negative)          Medications before admission:     Current Facility-Administered Medications   Medication Dose Route Frequency    ipratropium (ATROVENT) 0.02 % nebulizer solution 0.5 mg  0.5 mg Nebulization Q6H RT    levalbuterol (XOPENEX) nebulizer soln 1.25 mg/3 mL  1.25 mg Nebulization Q6H RT    levalbuterol (XOPENEX) nebulizer soln 1.25 mg/3 mL  1.25 mg Nebulization Q2H PRN    guaiFENesin ER (MUCINEX) tablet 600 mg  600 mg Oral Q12H    0.9% sodium chloride infusion 250 mL  250 mL IntraVENous PRN    dilTIAZem (CARDIZEM) 125 mg in dextrose 5% 125 mL infusion  5-15 mg/hr IntraVENous TITRATE    predniSONE (DELTASONE) tablet 40 mg  40 mg Oral BID WITH MEALS    dilTIAZem (CARDIZEM) IR tablet 60 mg  60 mg Oral TIDAC    solifenacin (VESICARE) tablet 5 mg  5 mg Oral DAILY    polyethylene glycol (MIRALAX) packet 17 g  17 g Oral DAILY    lidocaine (XYLOCAINE) 2 % jelly   Mucous Membrane PRN    budesonide (PULMICORT) 500 mcg/2 ml nebulizer suspension  500 mcg Nebulization BID RT    arformoterol (BROVANA) neb solution 15 mcg  15 mcg Nebulization BID RT    sodium chloride (NS) flush 5-10 mL  5-10 mL IntraVENous Q8H    sodium chloride (NS) flush 5-10 mL  5-10 mL IntraVENous PRN    acetaminophen (TYLENOL) tablet 650 mg  650 mg Oral Q4H PRN    ondansetron (ZOFRAN) injection 4 mg  4 mg IntraVENous Q4H PRN    cefTRIAXone (ROCEPHIN) 1 g in 0.9% sodium chloride (MBP/ADV) 50 mL  1 g IntraVENous Q24H    busPIRone (BUSPAR) tablet 5 mg  5 mg Oral BID    folic acid (FOLVITE) tablet 1 mg  1 mg Oral DAILY WITH DINNER    montelukast (SINGULAIR) tablet 10 mg  10 mg Oral DAILY WITH DINNER    pantoprazole (PROTONIX) tablet 40 mg  40 mg Oral ACB    theophylline ER (FRANCISCO-24) capsule 200 mg  200 mg Oral BID       Physical Exam:  Visit Vitals    /83    Pulse (!) 114    Temp 98.6 °F (37 °C)    Resp 17    Ht 5' 10\" (1.778 m)    Wt 150 lb 12.7 oz (68.4 kg)    SpO2 92%    BMI 21.64 kg/m2    O2 Flow Rate (L/min): 2 l/min O2 Device: Nasal cannula      Telemetry:     Gen: Well-developed, elderly  Neck: Unable to appreciate JVD, No Carotid Bruit,   Resp: No accessory muscle use, bilateral rhonchi present  Card: Irregular rate,Rythm,Normal S1, S2, 2/6 systolic murmur present   Abd:  Soft, \ non-distended,   LE: Trace edema    EKG:        Cxray:    LABS:        Lab Results   Component Value Date/Time    WBC 17.4 11/05/2017 05:00 AM    HGB 8.8 11/05/2017 05:00 AM    HCT 26.4 11/05/2017 05:00 AM    PLATELET 813 87/15/2816 05:00 AM    MCV 87.4 11/05/2017 05:00 AM     Lab Results   Component Value Date/Time    Sodium 128 11/05/2017 05:00 AM    Potassium 4.2 11/05/2017 05:00 AM    Chloride 93 11/05/2017 05:00 AM    CO2 31 11/05/2017 05:00 AM    Anion gap 4 11/05/2017 05:00 AM    Glucose 114 11/05/2017 05:00 AM    BUN 17 11/05/2017 05:00 AM    Creatinine 0.75 11/05/2017 05:00 AM    BUN/Creatinine ratio 23 11/05/2017 05:00 AM    GFR est AA >60 11/05/2017 05:00 AM GFR est non-AA >60 11/05/2017 05:00 AM    Calcium 9.0 11/05/2017 05:00 AM     Lab Results   Component Value Date/Time     10/31/2017 03:36 PM    CK-MB Index 2.7 10/31/2017 03:36 PM    Troponin-I, Qt. <0.04 11/01/2017 12:49 PM     Lab Results   Component Value Date/Time    aPTT 29.5 10/31/2017 03:45 PM     Lab Results   Component Value Date/Time    INR 1.1 11/01/2017 04:25 AM    INR 1.0 10/31/2017 03:45 PM    Prothrombin time 11.3 11/01/2017 04:25 AM    Prothrombin time 10.5 10/31/2017 03:45 PM     No results found for: BNP, BNPP, XBNPT    Care Plan discussed with:   Desean Aguirre MD

## 2017-11-05 NOTE — PROGRESS NOTES
This RN assumed care of pt. Pt is in bed, bed is low and locked. Call light in reach. Pt has on SCD's. Pt is alert and oriented x 4. Pt is getting CBI. Pt appears anxious and is using call light frequently to call RN. Pt is on cardizem drip and has an irregular Heart rhythm. Sometimes he is in Afib and sometimes Sinus tach with PAC's.     2200 RN prayed with patient to calm his anxiety. Pt appeared more at ease after prayer and he went to sleep.    1:51 AM  Pt refuses his neb tx  Pt is more often in NSR now    3:30 AM  Bedside shift change report given to receiving RN (oncoming nurse) by Harmeet Looney RN (offgoing nurse). Report given with SBAR, MAR, Recent Results, Vital Signs, and plan of care. Pt is alert and oriented x 4 . Call bell within reach of patient. Safety/fall precautions in place.

## 2017-11-05 NOTE — PROGRESS NOTES
Spiritual Care Assessment/Progress Notes    Harish Tubbs [id number]  [id number]    1935  80 y.o.  male    Patient Telephone Number: [phone] (home)   Lutheran Affiliation: Rosyi   Language: English   Extended Emergency Contact Information  Primary Emergency Contact: Karina Gutierrez  Address: [place] [facility] [address] Phone: [phone]  Relation: Spouse   Patient Active Problem List    Diagnosis Date Noted    Paroxysmal atrial fibrillation ([facility] 11/01/2017    Gross hematuria 10/31/2017    COPD exacerbation ([facility] 10/31/2017    [facility]ocytopenia ([facility] 10/31/2017    Prostate cancer ([facility]     Mediastinal mass     Pulmonary nodule     HTN (hypertension)         Date: 11/5/2017       Level of Lutheran/Spiritual Activity:  [x]         Involved in marlon tradition/spiritual practice    []         Not involved in marlon tradition/spiritual practice  [x]         Spiritually oriented    []         Claims no spiritual orientation    []         seeking spiritual identity  []         Feels alienated from Jew practice/tradition  []         Feels angry about Jew practice/tradition  [x]         Spirituality/Jew tradition is a resource for coping at this time.   []         Not able to assess due to medical condition    Services Provided Today:  []         crisis intervention    []         reading Scriptures  [x]         spiritual assessment    [x]         prayer  [x]         empathic listening/emotional support  []         rites and rituals (cite in comments)  []         life review     []         Jew support  []         theological development   []         advocacy  []         ethical dialog     []         blessing  []         bereavement support    [x]         support to family  []         anticipatory grief support   []         help with AMD  []         spiritual guidance    []         meditation      Spiritual Care Needs  [x] Emotional Support  [x]         Spiritual/Restorationist Care  []         Loss/Adjustment  []         Advocacy/Referral                /Ethics  []         No needs expressed at               this time  []         Other: (note in               comments)  Spiritual Care Plan  []         Follow up visits with               pt/family  []         Provide materials  []         Schedule sacraments  []         Contact Community               Clergy  [x]         Follow up as needed  []         Other: (note in               comments)     Comments:  responded to consult request for pt in [place] [address].  provided empathic listening and supportive presence as pt shared about his medical journey. Pt shared that he had both good times and bad and he was willing to do what he can to go home. Pt added that there may be a procedure coming up for him and he was optimistic about it. Pt had 3 other family members in the room and his  had just come by to visit for support. Pt is a member of [address] [place] [address] in [place], [place]. Pt shared about his Restorationist and his . Pt mentioned that his marlon had been his source of coping and requested for prayer.  provided prayer for pt and family. Advised of availability and assured him of continued support as able and if needed/ desired. Sudheer Nicole M.S.   Spiritual Care Department  If needs rise please call [facility] [phone])

## 2017-11-05 NOTE — PROGRESS NOTES
Problem: Falls - Risk of  Goal: *Absence of Falls  Document Chase Fall Risk and appropriate interventions in the flowsheet.    Fall Risk Interventions:  Mobility Interventions: OT consult for ADLs, Patient to call before getting OOB    Mentation Interventions: Adequate sleep, hydration, pain control, Bed/chair exit alarm, Door open when patient unattended, Evaluate medications/consider consulting pharmacy    Medication Interventions: Bed/chair exit alarm    Elimination Interventions: Call light in reach, Patient to call for help with toileting needs    History of Falls Interventions: Bed/chair exit alarm

## 2017-11-05 NOTE — PROGRESS NOTES
Problem: Pressure Injury - Risk of  Goal: *Prevention of pressure ulcer  Outcome: Progressing Towards Goal  Heels floated; pillows and repositioning used to prevent breakdown; also patient is able to turn himself    Problem: Falls - Risk of  Goal: *Absence of Falls  Document Chase Fall Risk and appropriate interventions in the flowsheet.    Outcome: Progressing Towards Goal  Fall Risk Interventions:  Mobility Interventions: OT consult for ADLs, PT Consult for mobility concerns    Mentation Interventions: Family/sitter at bedside, Door open when patient unattended, Adequate sleep, hydration, pain control    Medication Interventions: Evaluate medications/consider consulting pharmacy    Elimination Interventions: Patient to call for help with toileting needs    History of Falls Interventions: Evaluate medications/consider consulting pharmacy

## 2017-11-05 NOTE — PROGRESS NOTES
0700: Bedside shift change report given to Whitney Black (oncoming nurse) by Esther (offgoing nurse). Report included the following information SBAR, Kardex, Intake/Output, MAR and Recent Results. 1157: Dr TORRI Dorsey 9 from urology at  to see pt, CBI stopped by him. RN instructed what to do if need to restart. Will continue to monitor.   1230: Family at  with pt

## 2017-11-05 NOTE — ROUTINE PROCESS
Bedside and Verbal shift change report given to Lacho Beasley RN (oncoming nurse) by Rose Borrego RN (offgoing nurse). Report included the following information SBAR, Kardex, Procedure Summary, Intake/Output, MAR, Accordion, Recent Results and Med Rec Status.

## 2017-11-05 NOTE — PROGRESS NOTES
Sean Murillo amish [place] 79  566 [facility], [address] [place] [address]  [phone]      Medical Progress Note      NAME: Eliu Fan   :  1935  MRM:  [id number]    Date/Time: 2017         Subjective:     Chief Complaint:      Pain last night requiring irrigation with clots expelled. Breathing at baseline. Hoping for discharge soon       Objective:       Vitals:       Last 24hrs VS reviewed since prior progress note.  Most recent are:    Visit Vitals    /83    Pulse (!) 114    Temp 97.9 °F (36.6 °C)    Resp 17    Ht 5' 10\" (1.778 m)    Wt 68.4 kg (150 lb 12.7 oz)    SpO2 93%    BMI 21.64 kg/m2     SpO2 Readings from Last 6 Encounters:   17 93%    O2 Flow Rate (L/min): 2 l/min       Intake/Output Summary (Last 24 hours) at 17 0901  Last data filed at 17 0554   Gross per 24 hour   Intake            03074 ml   Output            70863 ml   Net             4545 ml        Exam:     Physical Exam:    Gen:  elderly, chronically ill appearing, NAD  HEENT:  Pink conjunctivae, PERRL, hearing intact to voice, moist mucous membranes  Neck:  Supple, without masses, thyroid non-tender  Resp:  No accessory muscle use, poor air movement   Card:  Irregular, no murmurs, normal S1, S2 without thrills, bruits or peripheral edema  Abd:  Soft, non-tender, non-distended, normoactive bowel sounds are present  Musc:  No cyanosis or clubbing  Skin:  Multiple ecchymosis on arms  Neuro:  Cranial nerves 3-12 are grossly intact, follows commands appropriately  Psych:  Good insight, oriented to person, place and time, alert    Medications Reviewed: (see below)    Lab Data Reviewed: (see below)    ______________________________________________________________________    Medications:     Current Facility-Administered Medications   Medication Dose Route Frequency    ipratropium (ATROVENT) 0.02 % nebulizer solution 0.5 mg  0.5 mg Nebulization Q6H RT    levalbuterol (XOPENEX) nebulizer soln 1.25 mg/3 mL  1.25 mg Nebulization Q6H RT    levalbuterol (XOPENEX) nebulizer soln 1.25 mg/3 mL  1.25 mg Nebulization Q2H PRN    guaiFENesin ER (MUCINEX) tablet 600 mg  600 mg Oral Q12H    0.9% sodium chloride infusion 250 mL  250 mL IntraVENous PRN    dilTIAZem (CARDIZEM) 125 mg in dextrose 5% 125 mL infusion  5-15 mg/hr IntraVENous TITRATE    predniSONE (DELTASONE) tablet 40 mg  40 mg Oral BID WITH MEALS    dilTIAZem (CARDIZEM) IR tablet 60 mg  60 mg Oral TIDAC    solifenacin (VESICARE) tablet 5 mg  5 mg Oral DAILY    polyethylene glycol (MIRALAX) packet 17 g  17 g Oral DAILY    lidocaine (XYLOCAINE) 2 % jelly   Mucous Membrane PRN    budesonide (PULMICORT) 500 mcg/2 ml nebulizer suspension  500 mcg Nebulization BID RT    arformoterol (BROVANA) neb solution 15 mcg  15 mcg Nebulization BID RT    sodium chloride (NS) flush 5-10 mL  5-10 mL IntraVENous Q8H    sodium chloride (NS) flush 5-10 mL  5-10 mL IntraVENous PRN    acetaminophen (TYLENOL) tablet 650 mg  650 mg Oral Q4H PRN    ondansetron (ZOFRAN) injection 4 mg  4 mg IntraVENous Q4H PRN    cefTRIAXone (ROCEPHIN) 1 g in 0.9% sodium chloride (MBP/ADV) 50 mL  1 g IntraVENous Q24H    busPIRone (BUSPAR) tablet 5 mg  5 mg Oral BID    folic acid (FOLVITE) tablet 1 mg  1 mg Oral DAILY WITH DINNER    montelukast (SINGULAIR) tablet 10 mg  10 mg Oral DAILY WITH DINNER    pantoprazole (PROTONIX) tablet 40 mg  40 mg Oral ACB    theophylline ER (FRANCISCO-24) capsule 200 mg  200 mg Oral BID          Lab Review:     Recent Labs      11/05/17   0500  11/04/17 0429 11/03/17   0541   WBC  17.4*  18.9*  20.0*   HGB  8.8*  8.3*  7.9*   HCT  26.4*  24.7*  23.1*   PLT  196  188  162     Recent Labs      11/05/17   0500  11/04/17 0429 11/03/17   0541   NA  128*  133*  135*   K  4.2  4.5  4.0   CL  93*  97  99   CO2  31  29  28   GLU  114*  115*  138*   BUN  17  21*  19   CREA  0.75  0.74  0.73   CA  9.0  9.0  9.3   MG   --    --   1.9   PHOS   --    -- 2.4*     No results found for: GLUCPOC       Assessment / Plan:     Gross hematuria: has chronic indwelling catheter. Also hx of prostate CA. CT concerning for papillary mass of R ureter. Cont bladder irrigation, clots flushed last night by nursing. Urology following, may need cystoscopy      Acute blood loss anemia: due to above. Will monitor Hgb closely, transfuse prn    Complicated UTI related to an indwelling catheter: no urine culture sent. Will have to treat empirically with CTX for 7 days    Afib RVR: new onset, no prior hx of afib. Echo with EF 55-60%. Rate elevated intermittently on dilt gtt, increase po dilt. Cardiology following    COPD exacerbation/chronic resp failure/hypoxia: on chronic 2.5L O2. Will cont duo nebs (changed to xopenex), improved while on IV steroids and now on PO. Pulm following    Hyponatremia: Na lower today, suspect may have component of SIADH due to lung disease    Mediastinal mass: seems chronic. Defer to pulm    Thrombocytopenia/ecchymosis: could be from MTX, underlying cancer, acute illness. Holding MTX, NSAIDS. Hematology following. Now resolved    Leukocytosis: stress response, steroids. Will monitor    HTN: no BB due to lung dz. dilt started    RA: holding MTX/bactrim    Multiple spine compression fracture: incidental findings on CT of T2, T9, L2. Chronically debilitated at home. Cont pain control.   Consult PT/OT    Total time spent with patient: 45 535 [facility] discussed with: Patient, nursing, discussed with wife    Discussed:  Care Plan    Prophylaxis:  SCD's    Disposition:  SNF/LTC vs HH           ___________________________________________________    Attending Physician: Jagjit Barraza MD

## 2017-11-05 NOTE — PROGRESS NOTES
One small clot. NO significant hematuria on slow CBI  IMP:  Better  REC:  See 11/4 note. DC CBI, leave hanging, restart prn.   D/w nurse

## 2017-11-05 NOTE — PROGRESS NOTES
Fall time change occurred at 0200 hrs. Documentation of patient care and medications administered is done with respect to the time change.

## 2017-11-05 NOTE — PROGRESS NOTES
Name: [facility] : [address] [place] [address] Dr HOOKER: 1935 Admit Date: 10/31/2017   Phone: [phone]  Room: [facility]/   PCP: Ronna Landon MD  MRN: [id number]   Date: 2017  Code: DNR          Chart and notes reviewed. Data reviewed. I review the patient's current medications in the medical record at each encounter. I have evaluated and examined the patient. Spoke with nursing.     More clots removed overnight, had some bladder pain  C/o cough and sob, able to clear some thick mucus  Urine pink      ROS: no cp f/c or n/v      Current Facility-Administered Medications   Medication Dose Route Frequency    dilTIAZem (CARDIZEM) IR tablet 90 mg  90 mg Oral TIDAC    ipratropium (ATROVENT) 0.02 % nebulizer solution 0.5 mg  0.5 mg Nebulization Q6H RT    levalbuterol (XOPENEX) nebulizer soln 1.25 mg/3 mL  1.25 mg Nebulization Q6H RT    levalbuterol (XOPENEX) nebulizer soln 1.25 mg/3 mL  1.25 mg Nebulization Q2H PRN    guaiFENesin ER (MUCINEX) tablet 600 mg  600 mg Oral Q12H    0.9% sodium chloride infusion 250 mL  250 mL IntraVENous PRN    dilTIAZem (CARDIZEM) 125 mg in dextrose 5% 125 mL infusion  5-15 mg/hr IntraVENous TITRATE    predniSONE (DELTASONE) tablet 40 mg  40 mg Oral BID WITH MEALS    solifenacin (VESICARE) tablet 5 mg  5 mg Oral DAILY    polyethylene glycol (MIRALAX) packet 17 g  17 g Oral DAILY    lidocaine (XYLOCAINE) 2 % jelly   Mucous Membrane PRN    budesonide (PULMICORT) 500 mcg/2 ml nebulizer suspension  500 mcg Nebulization BID RT    arformoterol (BROVANA) neb solution 15 mcg  15 mcg Nebulization BID RT    sodium chloride (NS) flush 5-10 mL  5-10 mL IntraVENous Q8H    sodium chloride (NS) flush 5-10 mL  5-10 mL IntraVENous PRN    acetaminophen (TYLENOL) tablet 650 mg  650 mg Oral Q4H PRN    ondansetron (ZOFRAN) injection 4 mg  4 mg IntraVENous Q4H PRN    cefTRIAXone (ROCEPHIN) 1 g in 0.9% sodium chloride (MBP/ADV) 50 mL  1 g IntraVENous Q24H    busPIRone (BUSPAR) tablet 5 mg  5 mg Oral BID    folic acid (FOLVITE) tablet 1 mg  1 mg Oral DAILY WITH DINNER    montelukast (SINGULAIR) tablet 10 mg  10 mg Oral DAILY WITH DINNER    pantoprazole (PROTONIX) tablet 40 mg  40 mg Oral ACB    theophylline ER (FRANCISCO-24) capsule 200 mg  200 mg Oral BID         REVIEW OF SYSTEMS   12 point ROS negative except as stated in the HPI. Physical Exam:   Visit Vitals    /90    Pulse 95    Temp 97.9 °F (36.6 °C)    Resp 15    Ht 5' 10\" (1.778 m)    Wt 68.4 kg (150 lb 12.7 oz)    SpO2 96%    BMI 21.64 kg/m2       General:  Alert, cooperative, sob appears stated age. Head:  Normocephalic, without obvious abnormality, atraumatic. Eyes:  Conjunctivae/corneas clear. Nose: Nares normal. Septum midline. Mucosa normal.    Throat: Lips, mucosa, and tongue normal.    Neck: Supple, symmetrical, trachea midline, no adenopathy. Lungs:   Diminished bilaterally; inc effort, some wheeze bilat, air mov't a little better    Chest wall:  No tenderness or deformity. Heart:  Irregular, rate 25L-243Q, no murmur, click, rub or gallop. Abdomen:   Soft, non-tender. Bowel sounds normal. No masses,  No organomegaly. : 3 - way jha, pink urine   Extremities: Extremities normal, atraumatic, no cyanosis or edema. Pulses: 2+ and symmetric all extremities.    Skin: Scattered ecchymosis, decreased skin turgor normal.    Lymph nodes: Cervical, supraclavicular nodes normal.   Neurologic: Symmetric motor exam, has jerking of legs, almost like myoclonus       Lab Results   Component Value Date/Time    Sodium 128 11/05/2017 05:00 AM    Potassium 4.2 11/05/2017 05:00 AM    Chloride 93 11/05/2017 05:00 AM    CO2 31 11/05/2017 05:00 AM    BUN 17 11/05/2017 05:00 AM    Creatinine 0.75 11/05/2017 05:00 AM    Glucose 114 11/05/2017 05:00 AM    Calcium 9.0 11/05/2017 05:00 AM    Magnesium 1.9 11/03/2017 05:41 AM    Phosphorus 2.4 11/03/2017 05:41 AM    Lactic acid 1.7 10/31/2017 03:36 PM       Lab Results   Component Value Date/Time    WBC 17.4 11/05/2017 05:00 AM    HGB 8.8 11/05/2017 05:00 AM    PLATELET 258 71/31/1487 05:00 AM    MCV 87.4 11/05/2017 05:00 AM       Lab Results   Component Value Date/Time    INR 1.1 11/01/2017 04:25 AM    aPTT 29.5 10/31/2017 03:45 PM    AST (SGOT) 56 11/02/2017 05:43 AM    Alk.  phosphatase 55 11/02/2017 05:43 AM    Protein, total 5.2 11/02/2017 05:43 AM    Albumin 2.8 11/02/2017 05:43 AM    Globulin 2.4 11/02/2017 05:43 AM       Lab Results   Component Value Date/Time    Iron 33 11/01/2017 02:26 PM    TIBC 219 11/01/2017 02:26 PM    Iron % saturation 15 11/01/2017 02:26 PM    Ferritin 585 11/01/2017 02:26 PM       Lab Results   Component Value Date/Time    TSH 0.74 11/01/2017 04:25 AM        No results found for: PH, PHI, PCO2, PCO2I, PO2, PO2I, HCO3, HCO3I, FIO2, FIO2I    Lab Results   Component Value Date/Time     10/31/2017 03:36 PM    CK-MB Index 2.7 10/31/2017 03:36 PM    Troponin-I, Qt. <0.04 11/01/2017 12:49 PM        No results found for: CULT    No results found for: TOXA1, RPR, HBCM, HBSAG, HAAB, HCAB1, HAAT, G6PD, CRYAC, HIVGT, HIVR, HIV1, HIV12, HIVPC, HIVRPI    Lab Results   Component Value Date/Time     10/31/2017 03:36 PM       No results found for: COLOR, APPRN, SPGRU, BRANT, PROTU, GLUCU, KETU, BILU, BLDU, UROU, NAVEED, LEUKU, WBCU, RBCU, UEPI, BACTU, CASTS, UCRY      Images: no new images this morning      IMPRESSION  · Hematuria with ? small bladder mass vs clot  · Atrial fibrillation with RVR  · Chronic hypoxia  · COPD with potential exacerbation  · Hyponatremia improved  · Thrombocytopenia - was on mtx for years, RA - held  · Abnormal chest CT: mediastinal mass and RUL nodule; stable since 2012  · RA  · HTN  · Hx of prostate cancer: currently treated with Lupron    PLAN  · Ongoing hematuria - may need cystoscopy, I think this is better option than home cbi given complexity and problems we have had here, he has increased risk for resp failure but is cleared from pulmonary standpoint  · Chronic hypoxic resp failure, ongoing copd sx and at risk for worsening, continue mucinex, continue bid steroids, if gets worse change abx - on rocephin   · Afib on iv cardizem, worsened by beta agonists and theophylline but need to continue those for now    Discussed with nursing       Ely Griffin MD

## 2017-11-05 NOTE — PROGRESS NOTES
1600- Bedside and Verbal shift change report given to Melanie Allan RN (oncoming nurse) by Sharron So  ( ICU ) RN (offgoing nurse). Report included the following information SBAR, Kardex and Recent Results. 1900- Bedside and Verbal shift change report given to   Jose Carlos Martin (oncoming nurse) by Jasen Oviedo (offgoing nurse). Report included the following information SBAR, Kardex and Recent Results.

## 2017-11-05 NOTE — ROUTINE PROCESS
Bedside and Verbal shift change report given to Myron Shepherd RN (oncoming nurse) by Cameron Sewell RN (offgoing nurse). Report included the following information SBAR, Kardex, Procedure Summary, Intake/Output, MAR, Accordion, Recent Results and Med Rec Status.

## 2017-11-06 LAB
ANION GAP SERPL CALC-SCNC: 7 MMOL/L (ref 5–15)
BUN SERPL-MCNC: 19 MG/DL (ref 6–20)
BUN/CREAT SERPL: 24 (ref 12–20)
CALCIUM SERPL-MCNC: 8.5 MG/DL (ref 8.5–10.1)
CHLORIDE SERPL-SCNC: 92 MMOL/L (ref 97–108)
CO2 SERPL-SCNC: 28 MMOL/L (ref 21–32)
CREAT SERPL-MCNC: 0.8 MG/DL (ref 0.7–1.3)
ERYTHROCYTE [DISTWIDTH] IN BLOOD BY AUTOMATED COUNT: 15.1 % (ref 11.5–14.5)
GLUCOSE SERPL-MCNC: 134 MG/DL (ref 65–100)
HCT VFR BLD AUTO: 25.1 % (ref 36.6–50.3)
HGB BLD-MCNC: 8.2 G/DL (ref 12.1–17)
MAGNESIUM SERPL-MCNC: 1.8 MG/DL (ref 1.6–2.4)
MCH RBC QN AUTO: 29.2 PG (ref 26–34)
MCHC RBC AUTO-ENTMCNC: 32.7 G/DL (ref 30–36.5)
MCV RBC AUTO: 89.3 FL (ref 80–99)
PLATELET # BLD AUTO: 219 K/UL (ref 150–400)
POTASSIUM SERPL-SCNC: 4.5 MMOL/L (ref 3.5–5.1)
RBC # BLD AUTO: 2.81 M/UL (ref 4.1–5.7)
SODIUM SERPL-SCNC: 127 MMOL/L (ref 136–145)
WBC # BLD AUTO: 17.1 K/UL (ref 4.1–11.1)

## 2017-11-06 PROCEDURE — 85027 COMPLETE CBC AUTOMATED: CPT | Performed by: INTERNAL MEDICINE

## 2017-11-06 PROCEDURE — 74011250637 HC RX REV CODE- 250/637: Performed by: INTERNAL MEDICINE

## 2017-11-06 PROCEDURE — 74011000250 HC RX REV CODE- 250: Performed by: INTERNAL MEDICINE

## 2017-11-06 PROCEDURE — 97530 THERAPEUTIC ACTIVITIES: CPT

## 2017-11-06 PROCEDURE — 74011000250 HC RX REV CODE- 250: Performed by: PHYSICIAN ASSISTANT

## 2017-11-06 PROCEDURE — 77010033678 HC OXYGEN DAILY

## 2017-11-06 PROCEDURE — 94640 AIRWAY INHALATION TREATMENT: CPT

## 2017-11-06 PROCEDURE — 65660000000 HC RM CCU STEPDOWN

## 2017-11-06 PROCEDURE — 36415 COLL VENOUS BLD VENIPUNCTURE: CPT | Performed by: INTERNAL MEDICINE

## 2017-11-06 PROCEDURE — 74011250636 HC RX REV CODE- 250/636: Performed by: INTERNAL MEDICINE

## 2017-11-06 PROCEDURE — 97110 THERAPEUTIC EXERCISES: CPT

## 2017-11-06 PROCEDURE — 74011250637 HC RX REV CODE- 250/637: Performed by: UROLOGY

## 2017-11-06 PROCEDURE — 74011250637 HC RX REV CODE- 250/637: Performed by: NURSE PRACTITIONER

## 2017-11-06 PROCEDURE — 74011000258 HC RX REV CODE- 258: Performed by: INTERNAL MEDICINE

## 2017-11-06 PROCEDURE — 80048 BASIC METABOLIC PNL TOTAL CA: CPT | Performed by: INTERNAL MEDICINE

## 2017-11-06 PROCEDURE — 83735 ASSAY OF MAGNESIUM: CPT | Performed by: INTERNAL MEDICINE

## 2017-11-06 PROCEDURE — 74011636637 HC RX REV CODE- 636/637: Performed by: INTERNAL MEDICINE

## 2017-11-06 RX ORDER — MAGNESIUM SULFATE HEPTAHYDRATE 40 MG/ML
2 INJECTION, SOLUTION INTRAVENOUS ONCE
Status: COMPLETED | OUTPATIENT
Start: 2017-11-06 | End: 2017-11-06

## 2017-11-06 RX ADMIN — PREDNISONE 30 MG: 20 TABLET ORAL at 16:31

## 2017-11-06 RX ADMIN — DILTIAZEM HYDROCHLORIDE 7.5 MG/HR: 5 INJECTION INTRAVENOUS at 04:52

## 2017-11-06 RX ADMIN — Medication 10 ML: at 14:10

## 2017-11-06 RX ADMIN — IPRATROPIUM BROMIDE 0.5 MG: 0.5 SOLUTION RESPIRATORY (INHALATION) at 13:38

## 2017-11-06 RX ADMIN — THEOPHYLLINE ANHYDROUS 200 MG: 200 CAPSULE, EXTENDED RELEASE ORAL at 20:55

## 2017-11-06 RX ADMIN — IPRATROPIUM BROMIDE 0.5 MG: 0.5 SOLUTION RESPIRATORY (INHALATION) at 07:35

## 2017-11-06 RX ADMIN — DILTIAZEM HYDROCHLORIDE 90 MG: 60 TABLET, FILM COATED ORAL at 16:31

## 2017-11-06 RX ADMIN — Medication 10 ML: at 21:36

## 2017-11-06 RX ADMIN — SOLIFENACIN SUCCINATE 5 MG: 5 TABLET, FILM COATED ORAL at 16:31

## 2017-11-06 RX ADMIN — ARFORMOTEROL TARTRATE 15 MCG: 15 SOLUTION RESPIRATORY (INHALATION) at 20:21

## 2017-11-06 RX ADMIN — LEVALBUTEROL HYDROCHLORIDE 1.25 MG: 1.25 SOLUTION RESPIRATORY (INHALATION) at 07:35

## 2017-11-06 RX ADMIN — CEFTRIAXONE SODIUM 1 G: 1 INJECTION, POWDER, FOR SOLUTION INTRAMUSCULAR; INTRAVENOUS at 20:53

## 2017-11-06 RX ADMIN — Medication 10 ML: at 05:00

## 2017-11-06 RX ADMIN — THEOPHYLLINE ANHYDROUS 200 MG: 200 CAPSULE, EXTENDED RELEASE ORAL at 09:04

## 2017-11-06 RX ADMIN — ARFORMOTEROL TARTRATE 15 MCG: 15 SOLUTION RESPIRATORY (INHALATION) at 07:34

## 2017-11-06 RX ADMIN — LEVALBUTEROL HYDROCHLORIDE 1.25 MG: 1.25 SOLUTION RESPIRATORY (INHALATION) at 13:38

## 2017-11-06 RX ADMIN — BUDESONIDE 500 MCG: 0.5 INHALANT RESPIRATORY (INHALATION) at 07:35

## 2017-11-06 RX ADMIN — BUSPIRONE HYDROCHLORIDE 5 MG: 5 TABLET ORAL at 09:04

## 2017-11-06 RX ADMIN — FOLIC ACID 1 MG: 1 TABLET ORAL at 16:31

## 2017-11-06 RX ADMIN — IPRATROPIUM BROMIDE 0.5 MG: 0.5 SOLUTION RESPIRATORY (INHALATION) at 20:21

## 2017-11-06 RX ADMIN — PREDNISONE 30 MG: 20 TABLET ORAL at 09:31

## 2017-11-06 RX ADMIN — DILTIAZEM HYDROCHLORIDE 90 MG: 30 TABLET, FILM COATED ORAL at 09:04

## 2017-11-06 RX ADMIN — POLYETHYLENE GLYCOL 3350 17 G: 17 POWDER, FOR SOLUTION ORAL at 09:04

## 2017-11-06 RX ADMIN — GUAIFENESIN 600 MG: 600 TABLET, EXTENDED RELEASE ORAL at 20:55

## 2017-11-06 RX ADMIN — PANTOPRAZOLE SODIUM 40 MG: 40 TABLET, DELAYED RELEASE ORAL at 09:04

## 2017-11-06 RX ADMIN — GUAIFENESIN 600 MG: 600 TABLET, EXTENDED RELEASE ORAL at 09:04

## 2017-11-06 RX ADMIN — DILTIAZEM HYDROCHLORIDE 90 MG: 60 TABLET, FILM COATED ORAL at 12:23

## 2017-11-06 RX ADMIN — BUSPIRONE HYDROCHLORIDE 5 MG: 5 TABLET ORAL at 20:55

## 2017-11-06 RX ADMIN — DILTIAZEM HYDROCHLORIDE 90 MG: 60 TABLET, FILM COATED ORAL at 21:36

## 2017-11-06 RX ADMIN — LEVALBUTEROL HYDROCHLORIDE 1.25 MG: 1.25 SOLUTION RESPIRATORY (INHALATION) at 20:21

## 2017-11-06 RX ADMIN — BUDESONIDE 500 MCG: 0.5 INHALANT RESPIRATORY (INHALATION) at 20:21

## 2017-11-06 RX ADMIN — MAGNESIUM SULFATE HEPTAHYDRATE 2 G: 40 INJECTION, SOLUTION INTRAVENOUS at 09:04

## 2017-11-06 RX ADMIN — MONTELUKAST SODIUM 10 MG: 10 TABLET, FILM COATED ORAL at 16:31

## 2017-11-06 NOTE — PROGRESS NOTES
Problem: Mobility Impaired (Adult and Pediatric)  Goal: *Acute Goals and Plan of Care (Insert Text)  Physical Therapy Goals  Initiated 11/1/2017  1. Patient will move from supine to sit and sit to supine , scoot up and down and roll side to side in bed with minimal assistance/contact guard assist within 7 day(s). 2.  Patient will transfer from bed to chair and chair to bed with minimal assistance/contact guard assist using the least restrictive device within 7 day(s). 3.  Patient will perform sit to stand with minimal assistance/contact guard assist within 7 day(s). physical Therapy TREATMENT  Patient: Lyle Jay (42 y.o. male)  Date: 11/6/2017  Diagnosis: Gross hematuria  Hematuria Gross hematuria  Procedure(s) (LRB):  CYSTO FULGURATION (N/A)    Precautions:   Falls    ASSESSMENT:  Pt Lynsey Juárez offers good participation and presents limited activity tolerance this date. He performs supine LE exercises with minimal assistance and bed mobility training with moderate to maximum assistance x2 as below. Pt sits edge of bed x 6 mins while performing dynamic tasks; he declines commode transfers due to fatigue and lack of necessity. Progression toward goals:  []    Improving appropriately and progressing toward goals  [x]    Improving slowly and progressing toward goals  []    Not making progress toward goals and plan of care will be adjusted     PLAN:  Patient continues to benefit from skilled intervention to address the above impairments. Continue treatment per established plan of care.   Discharge Recommendations:  Demond Jacobsen  Further Equipment Recommendations for Discharge:  Defer to SNF     SUBJECTIVE:   Patient stated That will be nice to sit on a commode rather than use a bed pan when I need to.    OBJECTIVE DATA SUMMARY:   Critical Behavior:  Neurologic State: Alert  Orientation Level: Oriented to person  Cognition: Follows commands, Decreased attention/concentration  Safety/Judgement: Decreased awareness of environment  Functional Mobility Training:  Bed Mobility:  Rolling: Moderate assistance; Additional time  Supine to Sit: Moderate assistance;Assist x2; Additional time  Sit to Supine: Maximum assistance;Assist x2; Additional time  Scooting: Maximum assistance;Assist x2; Additional time        Transfers:                                   Balance:  Sitting: Impaired  Sitting - Static: Fair (occasional)  Sitting - Dynamic: Poor (constant support)  Ambulation/Gait Training:                                                 Therapeutic Exercises: Ankle pumps x 10  Heel slides 2 x 5  SLR 1 x 5 AAROM  Pain:  Pain Scale 1: Numeric (0 - 10)  Pain Intensity 1: 0              Activity Tolerance:   HR as high as 113 with activity. Please refer to the flowsheet for vital signs taken during this treatment.   After treatment:   []    Patient left in no apparent distress sitting up in chair  [x]    Patient left in no apparent distress in bed  [x]    Call bell left within reach  [x]    Nursing notified  []    Caregiver present  []    Bed alarm activated    COMMUNICATION/COLLABORATION:   The patients plan of care was discussed with: Occupational Therapist and Registered Nurse    Jovita Hughes PT, DPT   Time Calculation: 36 mins

## 2017-11-06 NOTE — DISCHARGE SUMMARY
Physician Interim Summary   Patient ID:  Lei Delatorre  [id number]  62 y.o.  1935    PCP: Sabi Connelly MD     Consults: Cardiology, Pulmonary/Intensive care and Urology    Covering dates: 11/2/2017 through 11/6/2017    Admission Diagnoses: Gross hematuria  Hematuria    Hospital Course:   Gross hematuria: has chronic indwelling catheter. Also hx of prostate CA. CT concerning for papillary mass of R ureter. Urology following and will determine need for cystoscopy     Complicated UTI related to an indwelling catheter: no urine culture sent. Treated empirically for 7 days. Continuing CTX for copd     Afib RVR: new onset, no prior hx of afib. Echo with EF 55-60%. Rate elevated intermittently on dilt gtt, increase po dilt. Cardiology following     COPD exacerbation/chronic resp failure/hypoxia: on chronic 2.5L O2. Will cont duo nebs (changed to xopenex), improved while on IV steroids and now on PO. Pulm following         Additional hospital course and discharge summary will be done by discharging physician.

## 2017-11-06 NOTE — PROGRESS NOTES
Problem: Falls - Risk of  Goal: *Absence of Falls  Document Chase Fall Risk and appropriate interventions in the flowsheet.    Outcome: Progressing Towards Goal  Fall Risk Interventions:  Mobility Interventions: Bed/chair exit alarm    Mentation Interventions: Bed/chair exit alarm    Medication Interventions: Bed/chair exit alarm    Elimination Interventions: Call light in reach    History of Falls Interventions: Bed/chair exit alarm

## 2017-11-06 NOTE — PROGRESS NOTES
[employer]  Medical Oncology at [address] [place] [address]  [phone]    Hematology / Oncology Follow-up    Reason for Visit:   Suraj Jett is a 80 y.o. male who is seen for follow-up of thrombocytopenia, hematuria. Interval History:   Hematuria great improved, minimal now. Some issues with clots over the weekend, but none now. No bleeding elsewhere. Medications reviewed in the EMR. No Known Allergies     Review of Systems: A 6-point review of systems was obtained, negative except as reviewed in the HPI. Physical Exam:     Visit Vitals    /58    Pulse 92    Temp 98.4 °F (36.9 °C)    Resp 17    Ht 5' 10\" (1.778 m)    Wt 154 lb 1.6 oz (69.9 kg)    SpO2 92%    BMI 22.11 kg/m2     General: No distress, elderly and frail appearing  Eyes: Anicteric sclerae  HENT: Atraumatic  Neck: Supple  Respiratory: Normal respiratory effort, on O2 by nc  CV: No peripheral edema  GI: Soft, nontender, nondistended, no masses, no hepatomegaly, no splenomegaly  : jha catheter with CBI, hematuria noted  Skin: ecchymoses bilateral upper extremity; scattered LE; no rash or petechiae. Normal temperature, fair turgor, and texture. Psych: Alert, oriented, appropriate affect, normal judgment/insight      Results:     Lab Results   Component Value Date/Time    WBC 17.1 11/06/2017 05:06 AM    HGB 8.2 11/06/2017 05:06 AM    HCT 25.1 11/06/2017 05:06 AM    PLATELET 655 39/26/7400 05:06 AM    MCV 89.3 11/06/2017 05:06 AM    ABS.  NEUTROPHILS 9.2 11/01/2017 12:49 PM     Lab Results   Component Value Date/Time    Sodium 127 11/06/2017 05:06 AM    Potassium 4.5 11/06/2017 05:06 AM    Chloride 92 11/06/2017 05:06 AM    CO2 28 11/06/2017 05:06 AM    Glucose 134 11/06/2017 05:06 AM    BUN 19 11/06/2017 05:06 AM    Creatinine 0.80 11/06/2017 05:06 AM    GFR est AA >60 11/06/2017 05:06 AM    GFR est non-AA >60 11/06/2017 05:06 AM    Calcium 8.5 11/06/2017 05:06 AM    Creatinine (POC) 0.9 06/26/2013 12:04 PM     Lab Results   Component Value Date/Time    Bilirubin, total 0.3 11/02/2017 05:43 AM    ALT (SGPT) 26 11/02/2017 05:43 AM    AST (SGOT) 56 11/02/2017 05:43 AM    Alk. phosphatase 55 11/02/2017 05:43 AM    Protein, total 5.2 11/02/2017 05:43 AM    Albumin 2.8 11/02/2017 05:43 AM    Globulin 2.4 11/02/2017 05:43 AM     Lab Results   Component Value Date/Time    Reticulocyte count 2.2 11/01/2017 12:49 PM    Iron % saturation 15 11/01/2017 02:26 PM    TIBC 219 11/01/2017 02:26 PM    Ferritin 585 11/01/2017 02:26 PM    Vitamin B12 671 11/01/2017 02:26 PM    Folate 17.5 11/01/2017 02:26 PM    Haptoglobin 152 11/01/2017 02:26 PM     11/01/2017 02:26 PM    TSH 0.74 11/01/2017 04:25 AM    M-spike Not Observed 11/01/2017 02:26 PM    Hep C  virus Ab Interp. NONREACTIVE 11/01/2017 02:26 PM     Lab Results   Component Value Date/Time    INR 1.1 11/01/2017 04:25 AM    aPTT 29.5 10/31/2017 03:45 PM    D-dimer 1.20 10/31/2017 03:45 PM    Fibrinogen 390 10/31/2017 08:47 PM       10/31/2017 Peripheral smear  PERIPHERAL SMEAR   (NOTE)   Comment:   Mild normocytic normochromic anemia. Mild leukocytosis with   neutrophilia.  Marked lymphopenia. Mild thrombocytopenia, few   platelet clumps noted. Slide reviewed by Dr. Jaycee Lombardi on 11/1/17. OMN. 10/31/2017 XR CHEST   IMPRESSION: Emphysema. Hiatal hernia. CT would be more sensitive and specific  for evaluating for change and following the patient's known pulmonary nodule and  mediastinal mass. 10/31/2017 CT CHEST W CONT  IMPRESSION:  Stable mediastinal mass and right apical solitary 4 mm nodule. 3 new (compared to 2013) compression fractures as described, but age  indeterminate. Known osteoporosis. Incidental emphysema. 11/1/2017 CT ABD PELV W CONT  1. Questionable papillary mass at the right ureteral orifice. 2. Mild left hydronephrosis and delayed excretion may be due to the proximity of  the Moore catheter balloon to the left ureterovesical junction.   3. Emphysema. 4. Osteoporosis. Age-indeterminate compression fractures L2-L5. 5. 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 Assessment and Recommendations: 1. Thrombocytopenia  Resolved now. This was likely secondary to bone marrow suppression from MTX and consumption related to ongoing bleeding. Labwork otherwise unremarkable. Continue to hold MTX and monitor CBC. 2. Anemia, normocytic ( s/p 1 unit PRBCs 11/3)  Likely multifactorial, as above, with bone marrow suppression from MTX and blood loss from hematuria. Labwork otherwise unremarkable. Monitor. Consider transfusion if HGB <8, given ongoing cardiac issues. 3.Hematuria  Improving. CT with questionable papillary mass at right UO, though this may simply be a clot. I am doubtful that the PLT of 70k was the cause sole cause of the hematuria, and would consider other etiologies. Urology following. 4.Leukocytosis  Secondary to steroids. Monitor. 5.Afib  Cardio following. No anticoagulation in setting of hematuria    6. Mediastinal mass/ RUL nodule  Noted on CT Scan; appears chronic and stable    7. Compression fx (T7,T9 and L2)  Noted on CT scan; new since 2013    8. ? Pancreatic mass  Likely an IPMN based on CT scan. Depending on goals of care moving forward, this could potentially be evaluated by GI as an outpatient. 9. Hx Prostate Cancer  Follows with Dr Miles Andersen; on intermittent ADT    10. RA  Follows with Dr Mya Almaraz, but not seen by their office in about 1.5 years; receives MTX every Wed; last dose 10/25/17. MTX now on hold due to plt count    11. Debility  PT/OT consulted      He is stable from a hematology perspective. I will sign off, but I remain available if needed. Please call with any questions.     Signed By: Simin Garduno MD     November 6, 2017

## 2017-11-06 NOTE — PROGRESS NOTES
Name: Daisha Rosas: [facility]   : 1935 Admit Date: 10/31/2017   Phone: [phone]  Room: [address] [place] [address]   PCP: Joaquin Hood MD  MRN: [id number]   Date: 2017  Code: DNR          Chart and notes reviewed. Data reviewed. I review the patient's current medications in the medical record at each encounter. I have evaluated and examined the patient. Spoke with nursing. Hematuria improving: off of CBI  On Dilt at 5mg  O2 sats 94% on 2L: baseline at home 2.5L  BP stable  WBC 17.1  Hgb 8.2: stable  Na 127: decreasing  Mag 1.8  BNP 1300      ROS: Feeling better today. Breathing slowly improving, but not quite back to baseline. Has not coughed up any phlegm as of yet today. Denies fever or chills. Denies CP, LE pain or swelling.       Current Facility-Administered Medications   Medication Dose Route Frequency    magnesium sulfate 2 g/50 ml IVPB (premix or compounded)  2 g IntraVENous ONCE    dilTIAZem (CARDIZEM) IR tablet 90 mg  90 mg Oral TIDAC    ipratropium (ATROVENT) 0.02 % nebulizer solution 0.5 mg  0.5 mg Nebulization Q6H RT    levalbuterol (XOPENEX) nebulizer soln 1.25 mg/3 mL  1.25 mg Nebulization Q6H RT    levalbuterol (XOPENEX) nebulizer soln 1.25 mg/3 mL  1.25 mg Nebulization Q2H PRN    guaiFENesin ER (MUCINEX) tablet 600 mg  600 mg Oral Q12H    0.9% sodium chloride infusion 250 mL  250 mL IntraVENous PRN    dilTIAZem (CARDIZEM) 125 mg in dextrose 5% 125 mL infusion  0-15 mg/hr IntraVENous TITRATE    predniSONE (DELTASONE) tablet 40 mg  40 mg Oral BID WITH MEALS    solifenacin (VESICARE) tablet 5 mg  5 mg Oral DAILY    polyethylene glycol (MIRALAX) packet 17 g  17 g Oral DAILY    lidocaine (XYLOCAINE) 2 % jelly   Mucous Membrane PRN    budesonide (PULMICORT) 500 mcg/2 ml nebulizer suspension  500 mcg Nebulization BID RT    arformoterol (BROVANA) neb solution 15 mcg  15 mcg Nebulization BID RT    sodium chloride (NS) flush 5-10 mL  5-10 mL IntraVENous Q8H    sodium chloride (NS) flush 5-10 mL  5-10 mL IntraVENous PRN    acetaminophen (TYLENOL) tablet 650 mg  650 mg Oral Q4H PRN    ondansetron (ZOFRAN) injection 4 mg  4 mg IntraVENous Q4H PRN    cefTRIAXone (ROCEPHIN) 1 g in 0.9% sodium chloride (MBP/ADV) 50 mL  1 g IntraVENous Q24H    busPIRone (BUSPAR) tablet 5 mg  5 mg Oral BID    folic acid (FOLVITE) tablet 1 mg  1 mg Oral DAILY WITH DINNER    montelukast (SINGULAIR) tablet 10 mg  10 mg Oral DAILY WITH DINNER    pantoprazole (PROTONIX) tablet 40 mg  40 mg Oral ACB    theophylline ER (FRANCISCO-24) capsule 200 mg  200 mg Oral BID         REVIEW OF SYSTEMS   12 point ROS negative except as stated in the HPI. Physical Exam:   Visit Vitals    /55    Pulse 77    Temp 98.4 °F (36.9 °C)    Resp 16    Ht 5' 10\" (1.778 m)    Wt 69.9 kg (154 lb 1.6 oz)    SpO2 94%    BMI 22.11 kg/m2       General:  Alert, cooperative, sob appears stated age. Head:  Normocephalic, without obvious abnormality, atraumatic. Eyes:  Conjunctivae/corneas clear. Nose: Nares normal. Septum midline. Mucosa normal.    Throat: Lips, mucosa, and tongue normal.    Neck: Supple, symmetrical, trachea midline, no adenopathy. Lungs:   Diminished bilaterally, scattered rhonchi, no wheeze   Chest wall:  No tenderness or deformity. Heart:  Irregular, rate 48A no murmur, click, rub or gallop. Abdomen:   Soft, non-tender. Bowel sounds normal.  : 3 - way jha, pink urine   Extremities: Extremities normal, atraumatic, no cyanosis or edema. Pulses: 2+ and symmetric all extremities.    Skin: Scattered ecchymosis, decreased skin turgor normal.    Lymph nodes: Cervical, supraclavicular nodes normal.   Neurologic: Symmetric motor exam, has jerking of legs, almost like myoclonus       Lab Results   Component Value Date/Time    Sodium 127 11/06/2017 05:06 AM    Potassium 4.5 11/06/2017 05:06 AM    Chloride 92 11/06/2017 05:06 AM    CO2 28 11/06/2017 05:06 AM    BUN 19 11/06/2017 05:06 AM    Creatinine 0.80 11/06/2017 05:06 AM    Glucose 134 11/06/2017 05:06 AM    Calcium 8.5 11/06/2017 05:06 AM    Magnesium 1.8 11/06/2017 05:06 AM    Phosphorus 2.4 11/03/2017 05:41 AM    Lactic acid 1.7 10/31/2017 03:36 PM       Lab Results   Component Value Date/Time    WBC 17.1 11/06/2017 05:06 AM    HGB 8.2 11/06/2017 05:06 AM    PLATELET 010 85/27/4847 05:06 AM    MCV 89.3 11/06/2017 05:06 AM       Lab Results   Component Value Date/Time    INR 1.1 11/01/2017 04:25 AM    aPTT 29.5 10/31/2017 03:45 PM    AST (SGOT) 56 11/02/2017 05:43 AM    Alk.  phosphatase 55 11/02/2017 05:43 AM    Protein, total 5.2 11/02/2017 05:43 AM    Albumin 2.8 11/02/2017 05:43 AM    Globulin 2.4 11/02/2017 05:43 AM       Lab Results   Component Value Date/Time    Iron 33 11/01/2017 02:26 PM    TIBC 219 11/01/2017 02:26 PM    Iron % saturation 15 11/01/2017 02:26 PM    Ferritin 585 11/01/2017 02:26 PM       Lab Results   Component Value Date/Time    TSH 0.74 11/01/2017 04:25 AM        No results found for: PH, PHI, PCO2, PCO2I, PO2, PO2I, HCO3, HCO3I, FIO2, FIO2I    Lab Results   Component Value Date/Time     10/31/2017 03:36 PM    CK-MB Index 2.7 10/31/2017 03:36 PM    Troponin-I, Qt. <0.04 11/01/2017 12:49 PM        No results found for: CULT    No results found for: TOXA1, RPR, HBCM, HBSAG, HAAB, HCAB1, HAAT, G6PD, CRYAC, HIVGT, HIVR, HIV1, HIV12, HIVPC, HIVRPI    Lab Results   Component Value Date/Time     10/31/2017 03:36 PM       No results found for: COLOR, APPRN, SPGRU, BRANT, PROTU, GLUCU, KETU, BILU, BLDU, UROU, NAVEED, LEUKU, WBCU, RBCU, UEPI, BACTU, CASTS, UCRY      Images: no new images this morning      IMPRESSION  · Hematuria with ? small bladder mass vs clot  · Atrial fibrillation with RVR  · Chronic hypoxia  · COPD with potential exacerbation  · Hyponatremia improved  · Thrombocytopenia - was on mtx for years, RA - held  · Abnormal chest CT: mediastinal mass and RUL nodule; stable since 2012  · RA  · HTN  · Hx of prostate cancer: currently treated with Lupron    PLAN  · Hematuria improving: continue to watch closely. Urology following. · Continue scheduled Brovana/Pulmicort nebs  · On Rocephin (day 7)  · Mucinex  · Wean steroids to 30mg prednisone BID  · Replete Mag  · Home Theophylline  · Rate control with Dilt PO and gtt. Cardiology following.   · PT/OT  · Encourage IS use  · GI Prophylaxis:  Protonix  · DVT Prophylaxis:  SCDerick Aguero NP

## 2017-11-06 NOTE — PROGRESS NOTES
Shift Summary    0710:  Patient resting quietly in bed. He is on 02 2L with no signs of distress noted. Cardizem drip at 7.5. Moore draining to the bedside. CBI turned off at this time. Urine in the tubing is yellow in color. No complaints at this time. 0830:  Patient resting in bed. No complaints voiced. Urine remains pink in color. CBI opened for a minute to flush out the bladder. A few small cots were noted. No other changes at this time. HR remains in the 90s to low 100s. Cardizem drip remain at 7.5. Arms are both noted to be weeping. ABD pads and kirlex applied. 1000: Patient resting quietly in bed. Family at the bedside. No complaints voiced. No changes noted. 1200:  Patient sitting upright in bed. Family at the bedside assisting him with lunch. No changes noted at this time. 1400:  Changed dressings over weeping areas of both arms. HR still in the upper 90s. Cardizem drip at 7.5.    1527:  Bethany Hughes questioning if Cardizem drip still on. Per Bethany Hughes, please try to wean off Cardizem slowly. Cardizem decreased to 5.    1700:  Family at the bedside assisting patient with dinner. He is tolerating Cardizem at 5.    1830:  Patient resting quietly. No complaints voiced. Cardizem at 5. HR from the high 80s to low 100s.

## 2017-11-06 NOTE — ROUTINE PROCESS
0700 Shift change report received from Ministerio WOLFE\[facility]\"" [place]. SBAR, Kardex, Procedure Summary, Intake/Output, MAR, Accordion, Recent Results and Cardiac Rhythm SR reviewed. 1900  Bedside report given to GILBERTO WOLFE. SBAR, Kardex, Procedure Summary, Intake/Output, MAR, Accordion, Med Rec Status and Cardiac Rhythm SR/ST reviewed. Patient is stable at this time. Call light within reach, bed alarm on, bed in low position.

## 2017-11-06 NOTE — PROGRESS NOTES
Urology, urine clear off cbi,hgb stable, would plug cath irrigation port, if remains clear no further rx.

## 2017-11-06 NOTE — PROGRESS NOTES
Cardiology Progress Note         NAME:  Altagracia Farooq   :   1935   MRN:   [id number]     Assessment/Plan:   PAF RVR triggered by illness, anemia, end stage COPD      - cont rate control - cont po cardizem- increase po dose - wean gtt as able         -  Prn  low dose dig if needed        -  Keep K >4 MG > 2       - No anticoagulation 2/2 hematuria /anemia  Anemia- s/p transfusion, stable   Hematuria- per urology   HTN- BP generally OK   End stage COPD - home O2/steroids  DNR      Pt personally seen and examined. Chart reviewed. Agree with advanced NP's history, exam and  A/P with changes/additons. Neck-no JVD  CVS-S1-S2 present,Irr,    2/6 systolic murmur present  RS-   Dec AE bilat/ Bilat scattered rhonchi+  Abdomen-  soft/NT  LE-   No edema    Discussed with patient/nursing    Abdi Banuelos MD, [facility]          Subjective: asked to resee 2/2 afib RVR this am 200 range    Altagracia Farooq is a 80y.o. year old male w/ hx: HTN , COPD (2.5L NC/steroids/theophylline) cx by PAF (during acute illness a few yrs ago,  no anticoagulation), Prostate Ca on Lupron , RA,  Anemia,  who presented on 10/31 for evaluation of hematuria x 3 days. The patient has a chronic indwelling jha for urinary retention. He denies any traumatic insertion but endorses switching catheter out prior to arrival to hospital.  The patient was placed on a continuous bladder irrigation and is being followed by urology. On admission patient was also noted to be HYPOnatermic    Overnight, the patient was noted to have intermittent A flutter with RVR. Chronic class IV dyspnea He denies any CP, edema, dizziness. Around 0500 patient was started on diltiazem gtt at 5 mg/hr but episodes of rapid a flutter persisted.    BP as low as 96/67 during episodes, limiting up-titration of diltiazem.     Per patient, he has had no \"heart problems\" in the past.  Denies CAD, afib, chronic class IV dyspnea  He has never had an ischemic workup in the past.      Trop (-)   Overnight activities reviewed:    - -130/55 range     - Tele  SR this am     - cardizem gtt 7.5 plus po cardizem     - K+ 4.5    Mg++1.8,       Cr stble o.8   - Hgb 8.2  plt normalized     - jha  Intermittent  bladder irrig         Cardiac ROS: chronic class IV dyspnea- breathing at baseline  Patient denies any exertional chest pain,  , palpitations, syncope, orthopnea, edema or paroxysmal nocturnal dyspnea. Review of Systems: hematuria remains, pain  w/ jha change No nausea, indigestion, vomiting, pain, cough, sputum. Taking po. Appetite OK        CARDIAC EVALUATION   ECHO 2017: EF 55-60%, no WMA, G1DD    Objective:     Visit Vitals    /55    Pulse 77    Temp 98.4 °F (36.9 °C)    Resp 16    Ht 5' 10\" (1.778 m)    Wt 154 lb 1.6 oz (69.9 kg)    SpO2 94%    BMI 22.11 kg/m2      O2 Flow Rate (L/min): 2 l/min O2 Device: Nasal cannula    Temp (24hrs), Av.2 °F (36.8 °C), Min:97.7 °F (36.5 °C), Max:98.4 °F (36.9 °C)        Intake/Output Summary (Last 24 hours) at 17 5933  Last data filed at 17 2000   Gross per 24 hour   Intake          6590.29 ml   Output             6550 ml   Net            40.29 ml       TELE: SR PACs PAF     General: AAOx3 cooperative, frail, chronically ill   HEENT: Atraumatic. Pale  and moist.  Anicteric sclerae. Neck: Supple,     Lungs: barrel chest, decreased throughout, air exchange  improved   Heart: PMI: diminished regular rhythm, 2/6 systolicmurmur, no S3, no rubs, no gallops. No JVD. No carotid bruits. Abdomen: Soft, non-distended, non-tender. + Bowel sounds. No bruits. : jha hematuria , intermittent irrigation   Extremities: skin friable, multiple ecchymotic areas on arms,SCDs,  No edema, no clubbing, no cyanosis. No calf tenderness  Neurologic: tremulous, Grossly intact. Alert and oriented X 3. No acute neurological distress. Psych: Good insight.  Not anxious nor agitated.       Care Plan discussed with:    Comments   Patient y    Family      RN y    Care Manager                    Consultant:  lincoln Attending        Data Review:   CMP:   Lab Results   Component Value Date/Time     (L) 11/06/2017 05:06 AM    K 4.5 11/06/2017 05:06 AM    CL 92 (L) 11/06/2017 05:06 AM    CO2 28 11/06/2017 05:06 AM    AGAP 7 11/06/2017 05:06 AM     (H) 11/06/2017 05:06 AM    BUN 19 11/06/2017 05:06 AM    CREA 0.80 11/06/2017 05:06 AM    GFRAA >60 11/06/2017 05:06 AM    GFRNA >60 11/06/2017 05:06 AM    CA 8.5 11/06/2017 05:06 AM    MG 1.8 11/06/2017 05:06 AM     CBC:   Lab Results   Component Value Date/Time    WBC 17.1 (H) 11/06/2017 05:06 AM    HGB 8.2 (L) 11/06/2017 05:06 AM    HCT 25.1 (L) 11/06/2017 05:06 AM     11/06/2017 05:06 AM     All Cardiac Markers in the last 24 hours:   No results found for: CPK, CK, CKMMB, CKMB, RCK3, CKMBT, CKNDX, CKND1, YEVGENIY, TROPT, TROIQ, NBA, TROPT, TNIPOC, BNP, BNPP  Recent Glucose Results:   Lab Results   Component Value Date/Time     (H) 11/06/2017 05:06 AM     ABG: No results found for: PH, PHI, PCO2, PCO2I, PO2, PO2I, HCO3, HCO3I, FIO2, FIO2I  LIPIDS:  No results found for: CHOL, HDL, LDLC, VLDL, CHHD    Medications reviewed  Current Facility-Administered Medications   Medication Dose Route Frequency    magnesium sulfate 2 g/50 ml IVPB (premix or compounded)  2 g IntraVENous ONCE    dilTIAZem (CARDIZEM) IR tablet 90 mg  90 mg Oral TIDAC    ipratropium (ATROVENT) 0.02 % nebulizer solution 0.5 mg  0.5 mg Nebulization Q6H RT    levalbuterol (XOPENEX) nebulizer soln 1.25 mg/3 mL  1.25 mg Nebulization Q6H RT    levalbuterol (XOPENEX) nebulizer soln 1.25 mg/3 mL  1.25 mg Nebulization Q2H PRN    guaiFENesin ER (MUCINEX) tablet 600 mg  600 mg Oral Q12H    0.9% sodium chloride infusion 250 mL  250 mL IntraVENous PRN    dilTIAZem (CARDIZEM) 125 mg in dextrose 5% 125 mL infusion  0-15 mg/hr IntraVENous TITRATE    predniSONE (DELTASONE) tablet 40 mg  40 mg Oral BID WITH MEALS    solifenacin (VESICARE) tablet 5 mg  5 mg Oral DAILY    polyethylene glycol (MIRALAX) packet 17 g  17 g Oral DAILY    lidocaine (XYLOCAINE) 2 % jelly   Mucous Membrane PRN    budesonide (PULMICORT) 500 mcg/2 ml nebulizer suspension  500 mcg Nebulization BID RT    arformoterol (BROVANA) neb solution 15 mcg  15 mcg Nebulization BID RT    sodium chloride (NS) flush 5-10 mL  5-10 mL IntraVENous Q8H    sodium chloride (NS) flush 5-10 mL  5-10 mL IntraVENous PRN    acetaminophen (TYLENOL) tablet 650 mg  650 mg Oral Q4H PRN    ondansetron (ZOFRAN) injection 4 mg  4 mg IntraVENous Q4H PRN    cefTRIAXone (ROCEPHIN) 1 g in 0.9% sodium chloride (MBP/ADV) 50 mL  1 g IntraVENous Q24H    busPIRone (BUSPAR) tablet 5 mg  5 mg Oral BID    folic acid (FOLVITE) tablet 1 mg  1 mg Oral DAILY WITH DINNER    montelukast (SINGULAIR) tablet 10 mg  10 mg Oral DAILY WITH DINNER    pantoprazole (PROTONIX) tablet 40 mg  40 mg Oral ACB    theophylline ER (FRANCISCO-24) capsule 200 mg  200 mg Oral BID         Shayla Dean, RN, ACNP-BC, AACC

## 2017-11-06 NOTE — PROGRESS NOTES
1900 - Bedside and Verbal shift change report given to kaleb rodgers (oncoming nurse) by Garry jordan (offgoing nurse). Report included the following information SBAR, Kardex, Intake/Output, MAR, Recent Results and Cardiac Rhythm afib.   0700 - Bedside and Verbal shift change report given to fernando jordan (oncoming nurse) by Livia Gilbert (offgoing nurse). Report included the following information SBAR, Kardex, Intake/Output, MAR and Recent Results.

## 2017-11-06 NOTE — PROGRESS NOTES
Nutrition Assessment:    RECOMMENDATIONS/INTERVENTION(S):   Continue Cardiac diet    RD to increase Ensure Enlive to BID    Monitor & replete lytes prn - Phos  ASSESSMENT:   11/6:  F/u. Labs/meds reviewed. Na low, was improving. No IVF. Phos low. , on prednisone. Continue steroids per Pulm. + 4#/2.6% desirable wt gain since admission. No edema noted. Visited pt this afternoon. Sitting up in bed eating lunch. Family at bedside. Pt reports continued decreased appetite, eating ~25% of meals (family states ~50%). Drinking all of ONS. Agreeable to increase in ONS frequency until PO of meals is consistently >50%. Last BM 11/4, on bowel regimen. 11/1:  Received MST referral for decreased appetite. Chart reviewed. 79 yo male admitted with hematuria, A-fib. Visited pt this morning. Did not eat much for breakfast.  Assisted pt with ordering lunch and dinner meals. Drinks Ensure shake at home once daily at lunch and requested to receive chocolate milk with breakfast and dinner meals--RD to order. Pt stated, \"I'm not a big eater. \"  Labs/Meds reviewed. Na+ 127. K+ and Mg normal.  Receiving IVF, Folic Acid, Solumedrol. No significant weight changes but underweight for advanced age with BMI 21.6.   Skin--no edema, bruising noted to BUE, chest.      SUBJECTIVE/OBJECTIVE:     Diet Order: Cardiac, Ensure Enlive once daily  % Eaten:    Patient Vitals for the past 72 hrs:   % Diet Eaten   11/06/17 0917 10 %   11/04/17 0822 50 %   11/03/17 1800 50 %     Pertinent Medications: [x] Reviewed    Past Medical History:   Diagnosis Date    COPD (chronic obstructive pulmonary disease) ([address]     on home oxygen at 2.5L    HTN (hypertension)     Mediastinal mass     Paroxysmal atrial fibrillation ([address] 11/1/2017    Prostate cancer ([address]     Pulmonary nodule     Rheumatoid [address] ([facility] [address] [facility]     followed by Dr. Grzegorz Garcia:  Lab Results   Component Value Date/Time    Sodium 127 11/06/2017 05:06 AM    Potassium 4.5 11/06/2017 05:06 AM    Chloride 92 11/06/2017 05:06 AM    CO2 28 11/06/2017 05:06 AM    Anion gap 7 11/06/2017 05:06 AM    Glucose 134 11/06/2017 05:06 AM    BUN 19 11/06/2017 05:06 AM    Creatinine 0.80 11/06/2017 05:06 AM    BUN/Creatinine ratio 24 11/06/2017 05:06 AM    GFR est AA >60 11/06/2017 05:06 AM    GFR est non-AA >60 11/06/2017 05:06 AM    Calcium 8.5 11/06/2017 05:06 AM    AST (SGOT) 56 11/02/2017 05:43 AM    Alk. phosphatase 55 11/02/2017 05:43 AM    Protein, total 5.2 11/02/2017 05:43 AM    Albumin 2.8 11/02/2017 05:43 AM    Globulin 2.4 11/02/2017 05:43 AM    A-G Ratio 1.2 11/02/2017 05:43 AM    ALT (SGPT) 26 11/02/2017 05:43 AM      Anthropometrics: Height: 5' 10\" (177.8 cm) Weight: 69.9 kg (154 lb 1.6 oz)    IBW (%IBW): 75.5 kg (166 lb 7.2 oz) ( ) UBW (%UBW):   (  %)    BMI: Body mass index is 22.11 kg/(m^2). This BMI is indicative of:   [x] Underweight for adv age   [] Normal    [] Overweight    []  Obesity    []  Extreme Obesity (BMI>40)  Estimated Nutrition Needs (Based on): 1918 Kcals/day (BMR (1390) x 1.2 AF + 250) , 68 g (-82 g/d (1.0-1.2 g/Kg actual body wt)) Protein  Carbohydrate:  At Least 130 g/day  Fluids: 1900 mL/day    Last BM: 11/4   [x]Active     []Hyperactive  []Hypoactive       [] Absent   BS  Skin:    [] Intact   [] Incision  [] Breakdown   [] DTI   [] Tears/Excoriation/Abrasion  []Edema [x] Other:  Bruising BUE     Wt Readings from Last 30 Encounters:   11/06/17 69.9 kg (154 lb 1.6 oz)      NUTRITION DIAGNOSES:   Problem:  Underweight     Etiology: related to advanced age   inadequate PO as compared to needs  Signs/Symptoms: as evidenced by BMI 21.6  reports of decreased appetite, 25-50% PO of meals    NUTRITION INTERVENTIONS:  Meals/Snacks: General/healthful diet   Supplements: Commercial supplement              GOAL:   PO of meals >/= 50%, ONS >/= 75% in the next 2-4 days    Cultural, Anabaptism, or Ethnic Dietary Needs: None     EDUCATION & DISCHARGE NEEDS:    [x] None Identified   [] Identified and Education Provided/Documented   [] Identified and Pt declined/was not appropriate      [x] Pt discussed in interdisciplinary rounds   [x] Discharge Needs:  See d/c order   [] No Nutrition Related Discharge Needs    NUTRITION RISK:   Pt Is At Nutrition Risk  [x]     No Nutrition Risk Identified  []       PT SEEN FOR:    []  MD Consult: []Calorie Count      []Diabetic Diet Education        []Diet Education     []Electrolyte Management     []General Nutrition Management and Supplements     []Management of Tube Feeding     []TPN Recommendations    []  RN Referral:  []MST score >=2     []Enteral/Parenteral Nutrition PTA     []Pregnant: Gestational DM or Multigestation                 [] Pressure Ulcer      []  Low BMI      []  Length of Stay       [] Dysphagia Diet         [] Ventilator      [x]  Follow-Up      Previous Recommendations:   [x] Implemented          [] Not Implemented          [] Not Applicable    Previous Goal:   [] Met              [x] Progressing Towards Goal              [] Not Progressing Towards Goal   [] Not Applicable              Melo Linton, [address] [place] [address]   Pager [phone]

## 2017-11-06 NOTE — PROGRESS NOTES
Sean Murillo amish [place] [address]  566 [facility], [address] [place] [address]  [phone]      Medical Progress Note      NAME: Meir Chisholm   :  1935  MRM:  [id number]    Date/Time: 2017         Subjective:     Chief Complaint:      Pain last night requiring irrigation with clots expelled. Breathing at baseline. Hoping for discharge soon       Objective:       Vitals:       Last 24hrs VS reviewed since prior progress note.  Most recent are:    Visit Vitals    /55    Pulse 77    Temp 98.4 °F (36.9 °C)    Resp 16    Ht 5' 10\" (1.778 m)    Wt 69.9 kg (154 lb 1.6 oz)    SpO2 94%    BMI 22.11 kg/m2     SpO2 Readings from Last 6 Encounters:   17 94%    O2 Flow Rate (L/min): 2 l/min       Intake/Output Summary (Last 24 hours) at 17 0750  Last data filed at 17 2000   Gross per 24 hour   Intake          6590.29 ml   Output            86756 ml   Net         -3559.71 ml        Exam:     Physical Exam:    Gen:  elderly, chronically ill appearing, NAD  HEENT:  Pink conjunctivae, PERRL, hearing intact to voice, moist mucous membranes  Neck:  Supple, without masses, thyroid non-tender  Resp:  No accessory muscle use, poor air movement   Card:  Irregular, no murmurs, normal S1, S2 without thrills, bruits or peripheral edema  Abd:  Soft, non-tender, non-distended, normoactive bowel sounds are present  Musc:  No cyanosis or clubbing  Skin:  Multiple ecchymosis on arms  Neuro:  Cranial nerves 3-12 are grossly intact, follows commands appropriately  Psych:  Good insight, oriented to person, place and time, alert    Medications Reviewed: (see below)    Lab Data Reviewed: (see below)    ______________________________________________________________________    Medications:     Current Facility-Administered Medications   Medication Dose Route Frequency    magnesium sulfate 2 g/50 ml IVPB (premix or compounded)  2 g IntraVENous ONCE    dilTIAZem (CARDIZEM) IR tablet 90 mg  90 mg Oral TIDAC    ipratropium (ATROVENT) 0.02 % nebulizer solution 0.5 mg  0.5 mg Nebulization Q6H RT    levalbuterol (XOPENEX) nebulizer soln 1.25 mg/3 mL  1.25 mg Nebulization Q6H RT    levalbuterol (XOPENEX) nebulizer soln 1.25 mg/3 mL  1.25 mg Nebulization Q2H PRN    guaiFENesin ER (MUCINEX) tablet 600 mg  600 mg Oral Q12H    0.9% sodium chloride infusion 250 mL  250 mL IntraVENous PRN    dilTIAZem (CARDIZEM) 125 mg in dextrose 5% 125 mL infusion  0-15 mg/hr IntraVENous TITRATE    predniSONE (DELTASONE) tablet 40 mg  40 mg Oral BID WITH MEALS    solifenacin (VESICARE) tablet 5 mg  5 mg Oral DAILY    polyethylene glycol (MIRALAX) packet 17 g  17 g Oral DAILY    lidocaine (XYLOCAINE) 2 % jelly   Mucous Membrane PRN    budesonide (PULMICORT) 500 mcg/2 ml nebulizer suspension  500 mcg Nebulization BID RT    arformoterol (BROVANA) neb solution 15 mcg  15 mcg Nebulization BID RT    sodium chloride (NS) flush 5-10 mL  5-10 mL IntraVENous Q8H    sodium chloride (NS) flush 5-10 mL  5-10 mL IntraVENous PRN    acetaminophen (TYLENOL) tablet 650 mg  650 mg Oral Q4H PRN    ondansetron (ZOFRAN) injection 4 mg  4 mg IntraVENous Q4H PRN    cefTRIAXone (ROCEPHIN) 1 g in 0.9% sodium chloride (MBP/ADV) 50 mL  1 g IntraVENous Q24H    busPIRone (BUSPAR) tablet 5 mg  5 mg Oral BID    folic acid (FOLVITE) tablet 1 mg  1 mg Oral DAILY WITH DINNER    montelukast (SINGULAIR) tablet 10 mg  10 mg Oral DAILY WITH DINNER    pantoprazole (PROTONIX) tablet 40 mg  40 mg Oral ACB    theophylline ER (FRANCISCO-24) capsule 200 mg  200 mg Oral BID          Lab Review:     Recent Labs      11/06/17   0506  11/05/17   0500  11/04/17   0429   WBC  17.1*  17.4*  18.9*   HGB  8.2*  8.8*  8.3*   HCT  25.1*  26.4*  24.7*   PLT  219  196  188     Recent Labs      11/06/17   0506  11/05/17   0500  11/04/17   0429   NA  127*  128*  133*   K  4.5  4.2  4.5   CL  92*  93*  97   CO2  28  31  29   GLU  134*  114*  115*   BUN  19  17  21*   CREA 0. 80  0.75  0.74   CA  8.5  9.0  9.0   MG  1.8   --    --      No results found for: GLUCPOC       Assessment / Plan:     Gross hematuria: has chronic indwelling catheter. Also hx of prostate CA. CT concerning for papillary mass of R ureter. Urology following and will determine need for cystoscopy     Acute blood loss anemia: due to above. Will monitor Hgb closely, transfuse prn    Complicated UTI related to an indwelling catheter: no urine culture sent. Treated empirically for 7 days. Continuing CTX for copd    Afib RVR: new onset, no prior hx of afib. Echo with EF 55-60%. Rate elevated intermittently on dilt gtt, increase po dilt. Cardiology following    COPD exacerbation/chronic resp failure/hypoxia: on chronic 2.5L O2. Will cont duo nebs (changed to xopenex), improved while on IV steroids and now on PO. Pulm following    Hyponatremia: Na lower today, suspect may have component of SIADH due to lung disease    Mediastinal mass: seems chronic. Defer to pulm    Thrombocytopenia/ecchymosis: could be from MTX, underlying cancer, acute illness. Holding MTX, NSAIDS. Hematology following. Now resolved    Leukocytosis: stress response, steroids. Will monitor    HTN: no BB due to lung dz. dilt started    RA: holding MTX/bactrim    Multiple spine compression fracture: incidental findings on CT of T2, T9, L2. Chronically debilitated at home. Cont pain control.   Consult PT/OT    Total time spent with patient: 30 300 Lamonte Moreno discussed with: Patient, nursing, discussed with wife    Discussed:  Care Plan    Prophylaxis:  SCD's    Disposition:  SNF/LTC vs HH           ___________________________________________________    Attending Physician: Telly Conde MD

## 2017-11-06 NOTE — PROGRESS NOTES
Problem: Self Care Deficits Care Plan (Adult)  Goal: *Acute Goals and Plan of Care (Insert Text)  Occupational Therapy Goals  Initiated 11/1/2017  1. Patient will perform grooming with supervision/set-up from supported sitting position within 7 day(s). 2.  Patient will perform upper body dressing and dressing with moderate assistance  within 7 day(s). 3.  Patient will perform lower body dressing and bathing with maximal assistance within 7 day(s). 4.  Patient will perform toilet transfers with moderate assistance to [facility] utilizing best technique within 7 day(s). 5.  Patient will perform all aspects of toileting with supervision/set-up within 7 day(s). 6.  Patient will participate in upper extremity therapeutic exercise/activities with supervision/set-up for 8 minutes within 7 day(s). 7.  Patient will utilize energy conservation techniques during functional activities with verbal cues within 7 day(s). Occupational Therapy TREATMENT  Patient: Wilson Varela (21 y.o. male)  Date: 11/6/2017  Diagnosis: Gross hematuria  Hematuria Gross hematuria  Procedure(s) (LRB):  CYSTO FULGURATION (N/A)    Precautions:  fall  Chart, occupational therapy assessment, plan of care, and goals were reviewed. ASSESSMENT:  Acknowledge new bedrest with bathroom privileges placed by urology on 11/4. Spoke with RN who has completed the order as patient has been cleared for activity as tolerated. Mr. Lucille Castillo was received in bed agreeable to activity. He continues to be limited by significant weakness, poor B shoulder ROM, and poor endurance. Patient also appears more confused today, he has difficulty with conversation, unable to complete thoughts, difficulty with memory, and is slow to process. HR more controlled today as compared to previous OT tx, as high as 113 bpm with activity. Patient tolerated one set of B UE AROM/AAROM exercises.   He tends to close his eyes and engage in focused pursed lip breathing during all activity. With cues, he is able to open eyes and relax some however continues focused breathing throughout nearly the entire tx. Patient required mod A x2 to come to sitting EOB. He was able to tolerate sitting for ~4 minutes in preparation for ADL/OOB transfers. Max A x2 required for return to supine + repositioning for comfort. Patient is making slow progress towards goals and would benefit from continued skilled OT per POC. Progression toward goals:  []          Improving appropriately and progressing toward goals  [x]          Improving slowly and progressing toward goals  []          Not making progress toward goals and plan of care will be adjusted     PLAN:  Patient continues to benefit from skilled intervention to address the above impairments. Continue treatment per established plan of care. Discharge Recommendations:  Skilled Nursing Facility  Further Equipment Recommendations for Discharge:  None at this time     SUBJECTIVE:   Patient stated Feels good to have my back off that bed.   Patient stated this when sitting EOB. OBJECTIVE DATA SUMMARY:   Cognitive/Behavioral Status:  Neurologic State: Alert  Orientation Level: Oriented to person  Cognition: Follows commands;Decreased attention/concentration  Perception: Cues to maintain midline in sitting  Perseveration: No perseveration noted  Safety/Judgement: Decreased awareness of environment     Functional Mobility and Transfers for ADLs:   Bed Mobility:  Rolling: Moderate assistance; Additional time  Supine to Sit: Moderate assistance;Assist x2; Additional time  Sit to Supine: Maximum assistance;Assist x2; Additional time  Scooting: Maximum assistance;Assist x2; Additional time     Balance:  Sitting: Impaired  Sitting - Static: Fair (occasional)  Sitting - Dynamic: Poor (constant support)     ADL Intervention:  Lower Body Dressing Assistance  Dressing Assistance: Total assistance(dependent)  Socks:  Total assistance (dependent)     Cognitive Retraining  Safety/Judgement: Decreased awareness of environment    Therapeutic Exercises:   Patient engaged in 1 set x10-12 reps of the following exercises: shoulder flexion to 90 degrees/extension- AAROM x12, elbow flexion/extension- AAROM + AROM x10, finger flexion/extension-AROM x10    Pain:  Pain Scale 1: Numeric (0 - 10)  Pain Intensity 1: 0    Activity Tolerance:    Patient with fair activity tolerance. Please refer to the flowsheet for vital signs taken during this treatment. After treatment:   []  Patient left in no apparent distress sitting up in chair  [x]  Patient left in no apparent distress in bed  [x]  Call bell left within reach  [x]  Nursing notified  []  Caregiver present  [x]  Bed alarm activated    COMMUNICATION/COLLABORATION:   The patients plan of care was discussed with: Physical Therapist, Registered Nurse and patient.     Carmen Herrera OTR/L  Time Calculation: 28 mins

## 2017-11-07 LAB
ANION GAP SERPL CALC-SCNC: 6 MMOL/L (ref 5–15)
BUN SERPL-MCNC: 24 MG/DL (ref 6–20)
BUN/CREAT SERPL: 31 (ref 12–20)
CALCIUM SERPL-MCNC: 8.7 MG/DL (ref 8.5–10.1)
CHLORIDE SERPL-SCNC: 92 MMOL/L (ref 97–108)
CO2 SERPL-SCNC: 29 MMOL/L (ref 21–32)
CREAT SERPL-MCNC: 0.77 MG/DL (ref 0.7–1.3)
ERYTHROCYTE [DISTWIDTH] IN BLOOD BY AUTOMATED COUNT: 15.3 % (ref 11.5–14.5)
GLUCOSE SERPL-MCNC: 122 MG/DL (ref 65–100)
HCT VFR BLD AUTO: 26.1 % (ref 36.6–50.3)
HGB BLD-MCNC: 8.8 G/DL (ref 12.1–17)
MCH RBC QN AUTO: 29.4 PG (ref 26–34)
MCHC RBC AUTO-ENTMCNC: 33.7 G/DL (ref 30–36.5)
MCV RBC AUTO: 87.3 FL (ref 80–99)
PLATELET # BLD AUTO: 246 K/UL (ref 150–400)
POTASSIUM SERPL-SCNC: 4.7 MMOL/L (ref 3.5–5.1)
RBC # BLD AUTO: 2.99 M/UL (ref 4.1–5.7)
SODIUM SERPL-SCNC: 127 MMOL/L (ref 136–145)
WBC # BLD AUTO: 18.3 K/UL (ref 4.1–11.1)

## 2017-11-07 PROCEDURE — 74011636637 HC RX REV CODE- 636/637: Performed by: NURSE PRACTITIONER

## 2017-11-07 PROCEDURE — 77010033678 HC OXYGEN DAILY

## 2017-11-07 PROCEDURE — 97110 THERAPEUTIC EXERCISES: CPT

## 2017-11-07 PROCEDURE — 97530 THERAPEUTIC ACTIVITIES: CPT

## 2017-11-07 PROCEDURE — 80048 BASIC METABOLIC PNL TOTAL CA: CPT | Performed by: INTERNAL MEDICINE

## 2017-11-07 PROCEDURE — 94640 AIRWAY INHALATION TREATMENT: CPT

## 2017-11-07 PROCEDURE — 74011000258 HC RX REV CODE- 258: Performed by: INTERNAL MEDICINE

## 2017-11-07 PROCEDURE — 74011250637 HC RX REV CODE- 250/637: Performed by: INTERNAL MEDICINE

## 2017-11-07 PROCEDURE — 74011000250 HC RX REV CODE- 250: Performed by: INTERNAL MEDICINE

## 2017-11-07 PROCEDURE — 85027 COMPLETE CBC AUTOMATED: CPT | Performed by: INTERNAL MEDICINE

## 2017-11-07 PROCEDURE — 74011250637 HC RX REV CODE- 250/637: Performed by: NURSE PRACTITIONER

## 2017-11-07 PROCEDURE — 36415 COLL VENOUS BLD VENIPUNCTURE: CPT | Performed by: INTERNAL MEDICINE

## 2017-11-07 PROCEDURE — 76450000000

## 2017-11-07 PROCEDURE — 74011636637 HC RX REV CODE- 636/637: Performed by: INTERNAL MEDICINE

## 2017-11-07 PROCEDURE — 65660000000 HC RM CCU STEPDOWN

## 2017-11-07 PROCEDURE — 74011000250 HC RX REV CODE- 250: Performed by: PHYSICIAN ASSISTANT

## 2017-11-07 PROCEDURE — 74011250637 HC RX REV CODE- 250/637: Performed by: UROLOGY

## 2017-11-07 RX ORDER — PREDNISONE 20 MG/1
20 TABLET ORAL 2 TIMES DAILY WITH MEALS
Status: DISCONTINUED | OUTPATIENT
Start: 2017-11-07 | End: 2017-11-08 | Stop reason: HOSPADM

## 2017-11-07 RX ORDER — AMOXICILLIN 250 MG
1 CAPSULE ORAL DAILY
Status: DISCONTINUED | OUTPATIENT
Start: 2017-11-07 | End: 2017-11-08 | Stop reason: HOSPADM

## 2017-11-07 RX ADMIN — LEVALBUTEROL HYDROCHLORIDE 1.25 MG: 1.25 SOLUTION RESPIRATORY (INHALATION) at 13:15

## 2017-11-07 RX ADMIN — BUDESONIDE 500 MCG: 0.5 INHALANT RESPIRATORY (INHALATION) at 20:01

## 2017-11-07 RX ADMIN — PREDNISONE 20 MG: 20 TABLET ORAL at 17:15

## 2017-11-07 RX ADMIN — IPRATROPIUM BROMIDE 0.5 MG: 0.5 SOLUTION RESPIRATORY (INHALATION) at 20:01

## 2017-11-07 RX ADMIN — IPRATROPIUM BROMIDE 0.5 MG: 0.5 SOLUTION RESPIRATORY (INHALATION) at 08:06

## 2017-11-07 RX ADMIN — GUAIFENESIN 600 MG: 600 TABLET, EXTENDED RELEASE ORAL at 08:21

## 2017-11-07 RX ADMIN — LEVALBUTEROL HYDROCHLORIDE 1.25 MG: 1.25 SOLUTION RESPIRATORY (INHALATION) at 08:06

## 2017-11-07 RX ADMIN — LEVALBUTEROL HYDROCHLORIDE 1.25 MG: 1.25 SOLUTION RESPIRATORY (INHALATION) at 20:00

## 2017-11-07 RX ADMIN — Medication 10 ML: at 21:49

## 2017-11-07 RX ADMIN — FOLIC ACID 1 MG: 1 TABLET ORAL at 17:15

## 2017-11-07 RX ADMIN — BUDESONIDE 500 MCG: 0.5 INHALANT RESPIRATORY (INHALATION) at 08:06

## 2017-11-07 RX ADMIN — THEOPHYLLINE ANHYDROUS 200 MG: 200 CAPSULE, EXTENDED RELEASE ORAL at 23:47

## 2017-11-07 RX ADMIN — THEOPHYLLINE ANHYDROUS 200 MG: 200 CAPSULE, EXTENDED RELEASE ORAL at 08:20

## 2017-11-07 RX ADMIN — ARFORMOTEROL TARTRATE 15 MCG: 15 SOLUTION RESPIRATORY (INHALATION) at 20:00

## 2017-11-07 RX ADMIN — DILTIAZEM HYDROCHLORIDE 2.5 MG/HR: 5 INJECTION INTRAVENOUS at 00:01

## 2017-11-07 RX ADMIN — DOCUSATE SODIUM AND SENNOSIDES 1 TABLET: 8.6; 5 TABLET, FILM COATED ORAL at 11:01

## 2017-11-07 RX ADMIN — DILTIAZEM HYDROCHLORIDE 300 MG: 180 CAPSULE, COATED, EXTENDED RELEASE ORAL at 11:01

## 2017-11-07 RX ADMIN — PANTOPRAZOLE SODIUM 40 MG: 40 TABLET, DELAYED RELEASE ORAL at 08:21

## 2017-11-07 RX ADMIN — Medication 10 ML: at 05:06

## 2017-11-07 RX ADMIN — GUAIFENESIN 600 MG: 600 TABLET, EXTENDED RELEASE ORAL at 21:39

## 2017-11-07 RX ADMIN — ARFORMOTEROL TARTRATE 15 MCG: 15 SOLUTION RESPIRATORY (INHALATION) at 08:06

## 2017-11-07 RX ADMIN — POLYETHYLENE GLYCOL 3350 17 G: 17 POWDER, FOR SOLUTION ORAL at 08:21

## 2017-11-07 RX ADMIN — SOLIFENACIN SUCCINATE 5 MG: 5 TABLET, FILM COATED ORAL at 17:15

## 2017-11-07 RX ADMIN — PREDNISONE 30 MG: 20 TABLET ORAL at 08:20

## 2017-11-07 RX ADMIN — MONTELUKAST SODIUM 10 MG: 10 TABLET, FILM COATED ORAL at 17:15

## 2017-11-07 RX ADMIN — BUSPIRONE HYDROCHLORIDE 5 MG: 5 TABLET ORAL at 21:39

## 2017-11-07 RX ADMIN — IPRATROPIUM BROMIDE 0.5 MG: 0.5 SOLUTION RESPIRATORY (INHALATION) at 13:15

## 2017-11-07 RX ADMIN — BUSPIRONE HYDROCHLORIDE 5 MG: 5 TABLET ORAL at 08:20

## 2017-11-07 NOTE — PROGRESS NOTES
1900 - Bedside and Verbal shift change report given to kaleb rodgers (oncoming nurse) by Madeleine Reeves (offgoing nurse). Report included the following information SBAR, Kardex, Intake/Output, MAR and Recent Results. 0700 - Bedside and Verbal shift change report given to gregor mckeon (oncoming nurse) by Jason Carvajal (offgoing nurse). Report included the following information SBAR, Kardex, Intake/Output, MAR and Recent Results.
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
## 2017-11-07 NOTE — PROGRESS NOTES
11/7/2017   4:50PM  I met w/ Pt to discuss Rehab at D/C he states \"I would like to go home\" I encouraged him to consider short stay Rehab to get stronger and be able to perform more self-care. He is in agreement and would like referrals to SNF in the [place] area, I sent referrals to [address] [place] [address], [address] [place] [address] in [address] [place] [address]. CM will follow and assist.  Edson Cespedes    3:58 PM  I spoke w/ Pt's wife Meryle Abo [phone] regarding D/C plan and   PT recommendation for Rehab at D/. Pt's wife is in agreement however she feels he will not agree to it \"As he likes me to do everything for him\"  She stated he has essentially been bedridden for the last 18 mo. I told her I would meet w/ Pt to discuss, she is supportive and would like any encouragement for him.   Dealatosha Cespedes

## 2017-11-07 NOTE — PROGRESS NOTES
Music Therapy Assessment    Tesha Snow [id number]  [id number]    1935  80 y.o.  male    Patient Telephone Number: [phone] (home)   Mandaen Affiliation: Mendel Rayas   Language: [place]   Extended Emergency Contact Information  Primary Emergency Contact: Karina Gutierrez  Address: Deven Salgado [facility] [address] Phone: [phone]  Relation: Spouse   Patient Active Problem List    Diagnosis Date Noted    Paroxysmal atrial fibrillation ([facility] 11/01/2017    Gross hematuria 10/31/2017    COPD exacerbation ([facility] 10/31/2017    [facility] 10/31/2017    Prostate cancer ([facility]     Mediastinal mass     Pulmonary nodule     HTN (hypertension)         Date: 11/7/2017       Mental Status:   [x] Alert [  ] Perry Pompey [  ]  Confused  [  ] Minimally responsive    Communication Status: [x] Impaired Speech [  ] Nonverbal     Physical Status:   [x] Oxygen in use  [  ] Hard of Hearing [  ] Vision Impaired  [  ] Ambulatory  [  ] Ambulatory with assistance [  ] Non-ambulatory     Music Preferences, Background: Gospel; Pt said [employer] and When They Call the [employer]to Mini are some of his favorites. Clinical Problem addressed: Spiritual support, decrease feelings of stress and loneliness, increase relaxation. Goal(s) met in session:  Physical/Pain management (Scale of 1-10):    Pre-session rating: Pt denied pain. Post-session rating: Pt denied pain.     [  ] Increased relaxation   [  ] Regulated breathing patterns  [  ] Decreased muscle tension   [  ] Minimized physical distress     Emotional/Psychological:  [x] Increased self-expression   [  ] Decreased aggressive behavior   [  ] Decreased sadness   [  ] Discussed healthy coping skills     [  ] Improved mood    [  ] Decreased withdrawn behavior     Social:  [  ] Decreased feelings of isolation/loneliness [x] Positive social interaction   [  ] Provided support and/or comfort for family/friends    Spiritual:  [x] Spiritual support    [  ] Expressed peace  [x] Expressed marlon    [  ] Discussed beliefs    Techniques Utilized (Check all that apply):   [  ] Procedural support MT [  ] Music for relaxation [x] Patient preferred music  [  ] Aubree analysis  [x] Song choice  [  ] Music for validation  [x] Entrainment  [  ] Progressive muscle relax. [  ] Guided visualization  [  ] Pamrenee Lv  [  ] Patient instrument playing [  ] Neomia Simin writing  [  ] Tesha Skill along   [  ] Sun Lawton  [  ] Sensory stimulation  [  ] Active Listening  [x] Music for spiritual support [  ] Making of CDs as gifts    Session Observations:  Referral from Candelario Schmitt RN. The patient (pt) was observed to be lying in bed with feelings of stress, as evidenced by (AEB) facial muscle tension. This music therapist (MT) greeted the pt and introduced self, and then asked about the pt's music preferences. Pt had difficulty speaking, and even when the MT reassured the pt he didn't need to exert himself speaking to the MT, the pt continued to attempt to speak. He became flustered in these instances. He spoke several times about anticipating being moved to sit in the chair soon and that he's expecting a breathing treatment in a couple of hours. MT expressed understanding and offered to try one hymn with the pt, to which the pt agreed. MT sat at bedside and sang and played [employer] with guitar. MT entrained (matched) the tempo of the song to the pt's breathing rate. Pt increased relaxation in response to the music, AEB decreased facial muscle tension. MT began to sing and play When They Call [employer] with guitar and during the first verse the pt asked for a tissue. The MT put [employer] down to hand this to the pt. Pt requested the MT return at another time and spoke again about sitting in the chair and a breathing treatment.  The MT offered words of validation, saying it sounded like the pt felt overwhelmed by everything he had going on today, to which the pt agreed. The MT expressed understanding if the pt desired down time before these upcoming events. The pt expressed gratitude. Will follow as able.     Yesenia Pradhan MT-BC (Music Therapist-Board Certified)  Spiritual Care Department  Referral-based service

## 2017-11-07 NOTE — PROGRESS NOTES
Problem: Falls - Risk of  Goal: *Absence of Falls  Document Chase Fall Risk and appropriate interventions in the flowsheet.    Outcome: Progressing Towards Goal  Fall Risk Interventions:  Mobility Interventions: Bed/chair exit alarm, Patient to call before getting OOB    Mentation Interventions: Adequate sleep, hydration, pain control, Bed/chair exit alarm, More frequent rounding    Medication Interventions: Patient to call before getting OOB    Elimination Interventions: Bed/chair exit alarm, Patient to call for help with toileting needs    History of Falls Interventions: Bed/chair exit alarm, Door open when patient unattended

## 2017-11-07 NOTE — PROGRESS NOTES
Cardiology Progress Note         NAME:  Hernando Collins   :   1935   MRN:   [id number]     Assessment/Plan:   PAF RVR triggered by illness, anemia, end stage COPD- now SR       - cont rate control - cont po cardizem- change to LA          -  Prn  low dose dig if needed        -  Keep K >4 MG > 2       - No anticoagulation 2/2 hematuria /anemia  Anemia- s/p transfusion, stable   Hematuria- per urology   HTN- BP OK   End stage COPD - home O2/steroids  DNR   We will signoff,please call again if needed         Myah Rosenbaum MD, [facility]          Subjective: asked to resee 2/2 afib RVR this am 200 range    Hernando Collins is a 80y.o. year old male w/ hx: HTN , COPD (2.5L NC/steroids/theophylline) cx by PAF (during acute illness a few yrs ago,  no anticoagulation), Prostate Ca on Lupron , RA,  Anemia,  who presented on 10/31 for evaluation of hematuria x 3 days. The patient has a chronic indwelling jha for urinary retention. He denies any traumatic insertion but endorses switching catheter out prior to arrival to hospital.  The patient was placed on a continuous bladder irrigation and is being followed by urology. On admission patient was also noted to be HYPOnatermic    Overnight, the patient was noted to have intermittent A flutter with RVR. Chronic class IV dyspnea He denies any CP, edema, dizziness. Around 0500 patient was started on diltiazem gtt at 5 mg/hr but episodes of rapid a flutter persisted.    BP as low as 96/67 during episodes, limiting up-titration of diltiazem.     Per patient, he has had no \"heart problems\" in the past.  Denies CAD, afib, chronic class IV dyspnea  He has never had an ischemic workup in the past.      Trop (-)   Overnight activities reviewed:    - /55 range     - Tele  SR this am     - cardizem gtt off      - K+ 4.7          Cr stble o.77   - Hgb 8.8  plt normalized     - jha  Intermittent  bladder irrig Cardiac ROS: chronic class IV dyspnea- breathing at baseline  Patient denies any exertional chest pain,  , palpitations, syncope, orthopnea, edema or paroxysmal nocturnal dyspnea. Review of Systems: feeling some better, improving hematuria remains, appetite poor, OOB yesterday,  No nausea, indigestion, vomiting, pain, cough, sputum. Taking po. Appetite OK        CARDIAC EVALUATION   ECHO 2017: EF 55-60%, no WMA, G1DD    Objective:     Visit Vitals    /58 (BP 1 Location: Right arm, BP Patient Position: At rest)    Pulse 81    Temp 98.1 °F (36.7 °C)    Resp 15    Ht 5' 10\" (1.778 m)    Wt 151 lb 3.8 oz (68.6 kg)    SpO2 95%    BMI 21.7 kg/m2      O2 Flow Rate (L/min): 2 l/min O2 Device: Nasal cannula    Temp (24hrs), Av.3 °F (36.8 °C), Min:98 °F (36.7 °C), Max:98.4 °F (36.9 °C)        Intake/Output Summary (Last 24 hours) at 17 0810  Last data filed at 17 0506   Gross per 24 hour   Intake          1388.63 ml   Output             1620 ml   Net          -231.37 ml       TELE:  PACs  General: AAOx3 cooperative, frail, chronically ill   HEENT: Atraumatic. Pale  and moist.  Anicteric sclerae. Neck: Supple,     Lungs: barrel chest, decreased throughout, air exchange  improved   Heart: PMI: diminished regular rhythm, 2/6 systolicmurmur, no S3, no rubs, no gallops. No JVD. No carotid bruits. Abdomen: Soft, non-distended, non-tender. + Bowel sounds. No bruits. : jha hematuria - better  Extremities: skin friable, multiple ecchymotic areas on arms,SCDs,  No edema, no clubbing, no cyanosis. No calf tenderness  Neurologic: tremulous, Grossly intact. Alert and oriented X 3. No acute neurological distress. Psych: Good insight. Not anxious nor agitated.       Care Plan discussed with:    Comments   Patient y    Family      RN y    Care Manager                    Consultant:           Data Review:   CMP:   Lab Results   Component Value Date/Time     (L) 2017 04:56 AM    K 4.7 11/07/2017 04:56 AM    CL 92 (L) 11/07/2017 04:56 AM    CO2 29 11/07/2017 04:56 AM    AGAP 6 11/07/2017 04:56 AM     (H) 11/07/2017 04:56 AM    BUN 24 (H) 11/07/2017 04:56 AM    CREA 0.77 11/07/2017 04:56 AM    GFRAA >60 11/07/2017 04:56 AM    GFRNA >60 11/07/2017 04:56 AM    CA 8.7 11/07/2017 04:56 AM     CBC:   Lab Results   Component Value Date/Time    WBC 18.3 (H) 11/07/2017 04:56 AM    HGB 8.8 (L) 11/07/2017 04:56 AM    HCT 26.1 (L) 11/07/2017 04:56 AM     11/07/2017 04:56 AM     All Cardiac Markers in the last 24 hours:   No results found for: CPK, CK, CKMMB, CKMB, RCK3, CKMBT, CKNDX, CKND1, YEVGENIY, TROPT, TROIQ, NBA, TROPT, TNIPOC, BNP, BNPP  Recent Glucose Results:   Lab Results   Component Value Date/Time     (H) 11/07/2017 04:56 AM     ABG: No results found for: PH, PHI, PCO2, PCO2I, PO2, PO2I, HCO3, HCO3I, FIO2, FIO2I  LIPIDS:  No results found for: CHOL, HDL, LDLC, VLDL, CHHD    Medications reviewed  Current Facility-Administered Medications   Medication Dose Route Frequency    dilTIAZem (CARDIZEM) IR tablet 90 mg  90 mg Oral AC&HS    predniSONE (DELTASONE) tablet 30 mg  30 mg Oral BID WITH MEALS    ipratropium (ATROVENT) 0.02 % nebulizer solution 0.5 mg  0.5 mg Nebulization Q6H RT    levalbuterol (XOPENEX) nebulizer soln 1.25 mg/3 mL  1.25 mg Nebulization Q6H RT    levalbuterol (XOPENEX) nebulizer soln 1.25 mg/3 mL  1.25 mg Nebulization Q2H PRN    guaiFENesin ER (MUCINEX) tablet 600 mg  600 mg Oral Q12H    0.9% sodium chloride infusion 250 mL  250 mL IntraVENous PRN    dilTIAZem (CARDIZEM) 125 mg in dextrose 5% 125 mL infusion  0-15 mg/hr IntraVENous TITRATE    solifenacin (VESICARE) tablet 5 mg  5 mg Oral DAILY    polyethylene glycol (MIRALAX) packet 17 g  17 g Oral DAILY    lidocaine (XYLOCAINE) 2 % jelly   Mucous Membrane PRN    budesonide (PULMICORT) 500 mcg/2 ml nebulizer suspension  500 mcg Nebulization BID RT    arformoterol (BROVANA) neb solution 15 mcg  15 mcg Nebulization BID RT    sodium chloride (NS) flush 5-10 mL  5-10 mL IntraVENous Q8H    sodium chloride (NS) flush 5-10 mL  5-10 mL IntraVENous PRN    acetaminophen (TYLENOL) tablet 650 mg  650 mg Oral Q4H PRN    ondansetron (ZOFRAN) injection 4 mg  4 mg IntraVENous Q4H PRN    cefTRIAXone (ROCEPHIN) 1 g in 0.9% sodium chloride (MBP/ADV) 50 mL  1 g IntraVENous Q24H    busPIRone (BUSPAR) tablet 5 mg  5 mg Oral BID    folic acid (FOLVITE) tablet 1 mg  1 mg Oral DAILY WITH DINNER    montelukast (SINGULAIR) tablet 10 mg  10 mg Oral DAILY WITH DINNER    pantoprazole (PROTONIX) tablet 40 mg  40 mg Oral ACB    theophylline ER (FRANCISCO-24) capsule 200 mg  200 mg Oral BID         Shayla Bhandari RN, ACNP-BC, [facility]

## 2017-11-07 NOTE — PROGRESS NOTES
0700- Bedside report received patient resting in bed no acute concerns at this time. ~Edd MACIAS     1000- Family has come to visit patient, ~ GEOFF Medeiros RN     1200- PT has come to work with patient he will be sitting up in the chair. ~ GEOFF Medeiros RN     0551- Pt up in chair he is tolerating it well, no acute concerns to address. ~ GEOFF Medeiros RN     1430- Pt remains in the chair, I have addressed Dr. Myranda Hewitt with the palliative care concern that Shefali from nursing administration brought up he states that patient is at baseline and he will start the discharge process. ~ GEOFF Medeiros RN     1450- Pt back to bed 2 assist, legs were weak. ~ GEOFF Medeiros RN     9219- Arms re-wrapped due to weeping. ~ GEOFF Medeiros RN     2486- Wife has been called and updated that patient will be transferred to the 5th floor. ~ GEOFF Medeiros RN     1806- TRANSFER - OUT REPORT:    Verbal report given to 5th floor nurse  (name) on Eliu Fan  being transferred to 5th floor (unit) for routine progression of care       Report consisted of patients Situation, Background, Assessment and   Recommendations(SBAR). Information from the following report(s) SBAR, Kardex and Med Rec Status was reviewed with the receiving nurse. Lines:   Peripheral IV 11/05/17 Left; Anterior; Upper Arm (Active)   Site Assessment Clean, dry, & intact 11/7/2017  8:00 AM   Phlebitis Assessment 0 11/7/2017  8:00 AM   Infiltration Assessment 0 11/7/2017  8:00 AM   Dressing Status Clean, dry, & intact 11/7/2017  8:00 AM   Dressing Type Transparent 11/7/2017  8:00 AM   Hub Color/Line Status Pink 11/7/2017  8:00 AM   Action Taken Open ports on tubing capped 11/6/2017  8:00 PM   Alcohol Cap Used Yes 11/7/2017  4:00 AM        Opportunity for questions and clarification was provided.       Patient transported with:   Monitor  O2 @ 2.5 liters

## 2017-11-07 NOTE — PROGRESS NOTES
Problem: Mobility Impaired (Adult and Pediatric)  Goal: *Acute Goals and Plan of Care (Insert Text)  Physical Therapy Goals  Initiated 11/1/2017  1. Patient will move from supine to sit and sit to supine , scoot up and down and roll side to side in bed with minimal assistance/contact guard assist within 7 day(s). 2.  Patient will transfer from bed to chair and chair to bed with minimal assistance/contact guard assist using the least restrictive device within 7 day(s). 3.  Patient will perform sit to stand with minimal assistance/contact guard assist within 7 day(s). physical Therapy TREATMENT  Patient: Angela Kimbrough (31 y.o. male)  Date: 11/7/2017  Diagnosis: Gross hematuria  Hematuria Gross hematuria  Procedure(s) (LRB):  CYSTO FULGURATION (N/A)    Precautions:   fall    ASSESSMENT:  Based on the objective data described below, patient participated well with treatment today. Sit on the edge of bed with mod assist, sit to stand mod assist, sitting balance good, standing balance poor, stand pivot to the recliner mod assist, OOB to chair as tolerated performed some exercise on both LE all planes. Nurse in the room after therapy and agreed to monitor and assist back to bed when ask. Progression toward goals:  [x]    Improving appropriately and progressing toward goals  []    Improving slowly and progressing toward goals  []    Not making progress toward goals and plan of care will be adjusted     PLAN:  Patient continues to benefit from skilled intervention to address the above impairments. Continue treatment per established plan of care. Discharge Recommendations:  Skilled Nursing Facility  Further Equipment Recommendations for Discharge:  TBD     SUBJECTIVE:   Patient stated I would like to sit up for lunch .     OBJECTIVE DATA SUMMARY:   Critical Behavior:  Neurologic State: Alert  Orientation Level: Oriented to person, Oriented to place, Oriented to situation, Oriented to time  Cognition: Follows commands  Safety/Judgement: Decreased awareness of environment  Functional Mobility Training:  Bed Mobility:  Rolling: Moderate assistance; Additional time  Supine to Sit: Moderate assistance; Additional time  Sit to Supine: Moderate assistance; Additional time  Scooting: Moderate assistance; Additional time        Transfers:  Sit to Stand: Moderate assistance; Additional time  Stand to Sit: Moderate assistance; Additional time  Stand Pivot Transfers: Moderate assistance                          Balance:  Sitting: Intact; High guard  Sitting - Static: Good (unsupported)  Sitting - Dynamic: Good (unsupported)  Ambulation/Gait Training:              Gait Description (WDL): Exceptions to WDL                            Therapeutic Exercises:    Instructed patient to continue active range of motion exercise on both legs while up on chair or on bed. Pain:  Pain Scale 1: Numeric (0 - 10)  Pain Intensity 1: 0              Activity Tolerance:   Good. Please refer to the flowsheet for vital signs taken during this treatment. After treatment:   [x]    Patient left in no apparent distress sitting up in chair  []    Patient left in no apparent distress in bed  [x]    Call bell left within reach  [x]    Nursing notified  []    Caregiver present  []    Bed alarm activated    COMMUNICATION/COLLABORATION:   The patients plan of care was discussed with: Registered Nurse and [id number].    Time Calculation: 24 mins

## 2017-11-08 VITALS
HEART RATE: 99 BPM | WEIGHT: 151.24 LBS | DIASTOLIC BLOOD PRESSURE: 60 MMHG | SYSTOLIC BLOOD PRESSURE: 125 MMHG | BODY MASS INDEX: 21.65 KG/M2 | HEIGHT: 70 IN | TEMPERATURE: 97.7 F | OXYGEN SATURATION: 96 % | RESPIRATION RATE: 19 BRPM

## 2017-11-08 PROCEDURE — 77030032490 HC SLV COMPR SCD KNE COVD -B

## 2017-11-08 PROCEDURE — 74011636637 HC RX REV CODE- 636/637: Performed by: NURSE PRACTITIONER

## 2017-11-08 PROCEDURE — 74011250637 HC RX REV CODE- 250/637: Performed by: INTERNAL MEDICINE

## 2017-11-08 PROCEDURE — 74011250637 HC RX REV CODE- 250/637: Performed by: NURSE PRACTITIONER

## 2017-11-08 PROCEDURE — 97110 THERAPEUTIC EXERCISES: CPT

## 2017-11-08 PROCEDURE — 97530 THERAPEUTIC ACTIVITIES: CPT

## 2017-11-08 PROCEDURE — 94640 AIRWAY INHALATION TREATMENT: CPT

## 2017-11-08 PROCEDURE — 74011000250 HC RX REV CODE- 250: Performed by: PHYSICIAN ASSISTANT

## 2017-11-08 PROCEDURE — 74011000250 HC RX REV CODE- 250: Performed by: INTERNAL MEDICINE

## 2017-11-08 PROCEDURE — 77010033678 HC OXYGEN DAILY

## 2017-11-08 RX ORDER — PREDNISONE 20 MG/1
20 TABLET ORAL 2 TIMES DAILY WITH MEALS
Qty: 6 TAB | Refills: 0 | Status: SHIPPED | OUTPATIENT
Start: 2017-11-08 | End: 2017-11-11

## 2017-11-08 RX ORDER — AMOXICILLIN 250 MG
1 CAPSULE ORAL DAILY
Qty: 30 TAB | Refills: 0 | Status: SHIPPED | OUTPATIENT
Start: 2017-11-08 | End: 2017-12-08

## 2017-11-08 RX ORDER — THEOPHYLLINE 400 MG/1
200 TABLET, EXTENDED RELEASE ORAL 2 TIMES DAILY
COMMUNITY

## 2017-11-08 RX ORDER — DILTIAZEM HYDROCHLORIDE 300 MG/1
300 CAPSULE, COATED, EXTENDED RELEASE ORAL DAILY
Qty: 30 CAP | Refills: 0 | Status: SHIPPED | OUTPATIENT
Start: 2017-11-08 | End: 2017-12-08

## 2017-11-08 RX ADMIN — IPRATROPIUM BROMIDE 0.5 MG: 0.5 SOLUTION RESPIRATORY (INHALATION) at 08:04

## 2017-11-08 RX ADMIN — Medication 10 ML: at 06:27

## 2017-11-08 RX ADMIN — ARFORMOTEROL TARTRATE 15 MCG: 15 SOLUTION RESPIRATORY (INHALATION) at 08:04

## 2017-11-08 RX ADMIN — THEOPHYLLINE ANHYDROUS 200 MG: 200 CAPSULE, EXTENDED RELEASE ORAL at 11:10

## 2017-11-08 RX ADMIN — DILTIAZEM HYDROCHLORIDE 300 MG: 180 CAPSULE, COATED, EXTENDED RELEASE ORAL at 08:24

## 2017-11-08 RX ADMIN — LEVALBUTEROL HYDROCHLORIDE 1.25 MG: 1.25 SOLUTION RESPIRATORY (INHALATION) at 14:08

## 2017-11-08 RX ADMIN — GUAIFENESIN 600 MG: 600 TABLET, EXTENDED RELEASE ORAL at 08:24

## 2017-11-08 RX ADMIN — BUSPIRONE HYDROCHLORIDE 5 MG: 5 TABLET ORAL at 08:23

## 2017-11-08 RX ADMIN — POLYETHYLENE GLYCOL 3350 17 G: 17 POWDER, FOR SOLUTION ORAL at 08:23

## 2017-11-08 RX ADMIN — PREDNISONE 20 MG: 20 TABLET ORAL at 08:24

## 2017-11-08 RX ADMIN — LEVALBUTEROL HYDROCHLORIDE 1.25 MG: 1.25 SOLUTION RESPIRATORY (INHALATION) at 09:29

## 2017-11-08 RX ADMIN — IPRATROPIUM BROMIDE 0.5 MG: 0.5 SOLUTION RESPIRATORY (INHALATION) at 14:08

## 2017-11-08 RX ADMIN — DOCUSATE SODIUM AND SENNOSIDES 1 TABLET: 8.6; 5 TABLET, FILM COATED ORAL at 08:24

## 2017-11-08 RX ADMIN — PANTOPRAZOLE SODIUM 40 MG: 40 TABLET, DELAYED RELEASE ORAL at 06:27

## 2017-11-08 RX ADMIN — BUDESONIDE 500 MCG: 0.5 INHALANT RESPIRATORY (INHALATION) at 08:04

## 2017-11-08 NOTE — PROGRESS NOTES
Pharmacist Discharge Medication Reconciliation    Discharge Provider:  Dr. Mateus Lenz       Discharge Medications:      My Medications        STOP taking these medications              ALEVE 220 mg tablet   Generic drug:  naproxen sodium           BACTRIM -800 mg per tablet   Generic drug:  trimethoprim-sulfamethoxazole           CIPRO 500 mg tablet   Generic drug:  ciprofloxacin HCl           LASIX 20 mg tablet   Generic drug:  furosemide           TOPROL XL 25 mg XL tablet   Generic drug:  metoprolol succinate                 TAKE these medications as instructed         Instructions Each Dose to Equal   Morning Noon Evening Bedtime      albuterol-ipratropium 2.5 mg-0.5 mg/3 ml Nebu   Commonly known as:  DUO-NEB       Your last dose was: Your next dose is:              3 mL by Nebulization route every four (4) hours as needed. 3 mL                        BUSPAR PO       Your last dose was: Your next dose is: Take 5 mg by mouth two (2) times a day. 5 mg                        CeleBREX 200 mg capsule   Generic drug:  celecoxib       Your last dose was: Your next dose is: Take 200 mg by mouth daily. 200 mg                        dilTIAZem  mg ER capsule   Commonly known as:  CARDIZEM CD       Your last dose was: Your next dose is: Take 1 Cap by mouth daily for 30 days. 300 mg                        folic acid 1 mg tablet   Commonly known as:  FOLVITE       Your last dose was: Your next dose is: Take 1 mg by mouth daily (with dinner). 1 mg                        methotrexate 2.5 mg tablet   Commonly known as:  Claude Ruse       Your last dose was: Your next dose is: Take 15 mg by mouth every Wednesday. 15 mg                        * predniSONE 10 mg tablet   Commonly known as:  DELTASONE       Your last dose was: Your next dose is:               Take 10 mg by mouth daily (with breakfast). 10 mg                        * predniSONE 20 mg tablet   Commonly known as:  DELTASONE       Your last dose was: Your next dose is: Take 1 Tab by mouth two (2) times daily (with meals) for 3 days. 20 mg                        PROTONIX 40 mg tablet   Generic drug:  pantoprazole       Your last dose was: Your next dose is: Take 40 mg by mouth Daily (before breakfast). 40 mg                        senna-docusate 8.6-50 mg per tablet   Commonly known as:  PERICOLACE       Your last dose was: Your next dose is: Take 1 Tab by mouth daily for 30 days. 1 Tab                        SINGULAIR 10 mg tablet   Generic drug:  montelukast       Your last dose was: Your next dose is: Take 10 mg by mouth daily (with dinner). 10 mg                        SYMBICORT 160-4.5 mcg/actuation Hfaa   Generic drug:  budesonide-formoterol       Your last dose was: Your next dose is: Take 2 Puffs by inhalation two (2) times a day. 2 Puff                        * Notice: This list has 2 medication(s) that are the same as other medications prescribed for you. Read the directions carefully, and ask your doctor or other care provider to review them with you. ASK your physician about these medications         Instructions Each Dose to Equal   Morning Noon Evening Bedtime      theophylline  mg Tb24 tablet   Commonly known as:  UNIPHYL   Ask about: Which instructions should I use? Your last dose was: Your next dose is: Take 200 mg by mouth two (2) times a day. 200 mg                                 Where to Get Your Medications        Information on where to get these meds will be given to you by the nurse or doctor. !  Ask your nurse or doctor about these medications     dilTIAZem  mg ER capsule    predniSONE 20 mg tablet    senna-docusate 8.6-50 mg per tablet                The patient's chart, MAR, and AVS were reviewed by   Demar Persaud, [address],   Contact: [phone]

## 2017-11-08 NOTE — PROGRESS NOTES
11/8/2017 3:46 PM Ambulance arranged for 5PM with [facility]. Discussed with Jerrica, on-coming nurse. Jerrica given number to call report to [facility], [phone]. ERIC Carlos     11/8/2017 3:27 PM CAMPBELL spoke with Md about this pt this AM. SW met with this pt this Babs Medal from PT was present. Pt expressed that he was not going to go to rehab, but was going to go home first and then go to rehab later. SW informed the pt that this was not the Md's plan. SW informed him that it was not ideal for him to go home. SW informed him that he would discuss with the Md. SW discussed this with the Md and planned to follow up with the pt. SW went back to the pt's room and discussed with he and his wife. Pt states that he is now agreeable to rehab. SW informed him that there were 3 facilities interested in accepting him for rehab in [facility]. Pt and wife have expressed that they live in [place], [place] and really would like to go to a placement near their home. This was a significant change in plan due to the distance from a major city and the fact that no facilities near [place], [place] had been approached before today. Discussed with Md. Referrals sent to [facility], [facility] and [facility]. CAMPBELL spoke with Fadia Mccain with [facility]. They have accepted this pt for rehab placement and are able to receive him today. Discussed with pt and his wife. They are agreeable and understand that they will discharge today.    ERIC Carlos

## 2017-11-08 NOTE — PROGRESS NOTES
Problem: Falls - Risk of  Goal: *Absence of Falls  Document Chase Fall Risk and appropriate interventions in the flowsheet.    Outcome: Progressing Towards Goal  Fall Risk Interventions:  Mobility Interventions: Bed/chair exit alarm, Communicate number of staff needed for ambulation/transfer, Patient to call before getting OOB    Mentation Interventions: Adequate sleep, hydration, pain control, Bed/chair exit alarm, Door open when patient unattended, Eyeglasses and hearing aids, More frequent rounding, Room close to nurse's station, Toileting rounds    Medication Interventions: Assess postural VS orthostatic hypotension, Bed/chair exit alarm, Patient to call before getting OOB, Teach patient to arise slowly    Elimination Interventions: Bed/chair exit alarm, Call light in reach, Patient to call for help with toileting needs, Toileting schedule/hourly rounds    History of Falls Interventions: Bed/chair exit alarm

## 2017-11-08 NOTE — PROGRESS NOTES
Called report to nurse Jessi Ragsdale at [facility] in [place], [place].

## 2017-11-08 NOTE — PROGRESS NOTES
Problem: Mobility Impaired (Adult and Pediatric)  Goal: *Acute Goals and Plan of Care (Insert Text)  Physical Therapy Goals  Initiated 11/1/2017  1. Patient will move from supine to sit and sit to supine , scoot up and down and roll side to side in bed with minimal assistance/contact guard assist within 7 day(s). 2.  Patient will transfer from bed to chair and chair to bed with minimal assistance/contact guard assist using the least restrictive device within 7 day(s). 3.  Patient will perform sit to stand with minimal assistance/contact guard assist within 7 day(s). physical Therapy TREATMENT  Patient: Austin Bowers (29 y.o. male)  Date: 11/8/2017  Diagnosis: Gross hematuria  Hematuria Gross hematuria  Procedure(s) (LRB):  CYSTO FULGURATION (N/A)    Precautions:   fall    ASSESSMENT:  Based on the objective data described below, patient participated well with treatment today. Dangled on the edge of bed with mod assist, sitting balance fair, standing balance poor, sit to stand mod assist, stand pivot to the recliner mod assist. Performed some active assistive range of motion exercise on both LE all planes while sitting up. Spouse in the room during the entire therapy sessions and agreed with all goals set for the patient. Notified nurse who agreed to monitor and assist back to bed when ask. Progression toward goals:  []    Improving appropriately and progressing toward goals  [x]    Improving slowly and progressing toward goals  []    Not making progress toward goals and plan of care will be adjusted     PLAN:  Patient continues to benefit from skilled intervention to address the above impairments. Continue treatment per established plan of care. Discharge Recommendations:  Skilled Nursing Facility  Further Equipment Recommendations for Discharge:  TBD     SUBJECTIVE:   Patient stated ok We can try sitting up now. Bridger Leader    OBJECTIVE DATA SUMMARY:   Critical Behavior:  Neurologic State: Alert  Orientation Level: Oriented X4  Cognition: Follows commands  Safety/Judgement: Decreased awareness of environment  Functional Mobility Training:  Bed Mobility:  Rolling: Moderate assistance; Additional time  Supine to Sit: Moderate assistance; Additional time  Sit to Supine: Moderate assistance; Additional time  Scooting: Moderate assistance; Additional time        Transfers:  Sit to Stand: Moderate assistance; Additional time  Stand to Sit: Moderate assistance; Additional time  Stand Pivot Transfers: Moderate assistance     Bed to Chair: Moderate assistance; Additional time                    Balance:  Sitting: High guard  Sitting - Static: Good (unsupported)  Sitting - Dynamic: Fair (occasional)  Standing: Impaired;Pull to stand; With support  Standing - Static: Constant support;Poor  Standing - Dynamic : Poor  Ambulation/Gait Training:  Distance (ft): 2 Feet (ft)  Assistive Device: Walker, rolling;Gait belt  Ambulation - Level of Assistance: Maximum assistance; Additional time     Gait Description (WDL): Exceptions to WDL  Gait Abnormalities: Path deviations        Base of Support: Widened     Speed/Janey: Slow;Fluctuations  Step Length: Right shortened;Left shortened                Therapeutic Exercises:    Instructed patient to continue active range of motion exercise on both legs while up on chair or on bed. Pain:  Pain Scale 1: Numeric (0 - 10)  Pain Intensity 1: 0              Activity Tolerance:   Good. Please refer to the flowsheet for vital signs taken during this treatment. After treatment:   [x]    Patient left in no apparent distress sitting up in chair  []    Patient left in no apparent distress in bed  [x]    Call bell left within reach  [x]    Nursing notified  [x]    Caregiver present  []    Bed alarm activated    COMMUNICATION/COLLABORATION:   The patients plan of care was discussed with: Registered Nurse,  and patient    Elena Lala PT,CHERYL.    Time Calculation: 24 mins

## 2017-11-08 NOTE — PROGRESS NOTES
Sean Murillo [facility]  [address] [facility], [address] [place] [address]  [phone]      Medical Progress Note      NAME: Marikay Boast   :  1935  MRM:  [id number]    Date/Time: 2017  2:34 PM       Assessment and Plan:     Gross hematuria - Resolved after CBI. Has chronic indwelling catheter. Also hx of prostate CA. CT concerning for papillary mass of R ureter. Urology following, may need cystoscopy, but I await their final statement and sign off. Continue vesicare     Complicated UTI related to an indwelling catheter - Presumed. Treated empirically for 7 days. Usually on TIW Bactrim. Urology should decide if that continues     Multiple spine compression fracture - Noted as incidental findings on CT of T2, T9, L2. Chronically debilitated at home. Continue pain control. Consult PT/OT, will need SNF, and he agrees. Paroxysmal atrial fibrillation with RVR - New onset, no prior hx of afib. ECHO with EF 55-60%. Rate elevated intermittently on dilt gtt, so we increased po dilt. Cardiology following, and will do outpatient follow up. No anticoagulation was given, due to bleeding.     Acute blood loss anemia - POA due to hematuria and chronic disease. Now stable. Serologies normal.     COPD exacerbation - POA, resolved. Likely anemia exacerbated this. Finished PO prednisone taper, continue theophyline, singulair, pulmicort/Brova and guaifenesin  Prn duonebs. Chronic resp failure with hypoxia - Back to his baseline chronic 2.5L O2. Will cont duo nebs (changed to xopenex), improved while on IV steroids and now on PO. 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 Defer to pulm     Thrombocytopenia / ecchymosis - Could be from MTX, underlying cancer, acute illness. Holding MTX, NSAIDS. Hematology following.  Now resolving     Leukocytosis - Stress response, steroids. Completed antibiotcis     HTN - Now on diltiazem. No BB due to lung dz.     RA - Holding MTX/bactrim        Subjective:     Chief Complaint:  Feels better this AM, slept well for first time. ROS:  (bold if positive, if negative)    SOB/RENO  Tolerating some PT  Tolerating Diet        Objective:     Last 24hrs VS reviewed since prior progress note. Most recent are:    Visit Vitals    /67 (BP 1 Location: Right arm, BP Patient Position: At rest)    Pulse 97    Temp 97.2 °F (36.2 °C)    Resp 16    Ht 5' 10\" (1.778 m)    Wt 68.6 kg (151 lb 3.8 oz)    SpO2 97%    BMI 21.7 kg/m2     SpO2 Readings from Last 6 Encounters:   11/08/17 97%    O2 Flow Rate (L/min): 3 l/min       Intake/Output Summary (Last 24 hours) at 11/08/17 0905  Last data filed at 11/08/17 0458   Gross per 24 hour   Intake              360 ml   Output             1300 ml   Net             -940 ml        Physical Exam:    Gen:  Frail, in no acute distress  HEENT:  Pink conjunctivae, PERRL, hearing intact to voice, moist mucous membranes  Neck:  Supple, without masses, thyroid non-tender  Resp:  No accessory muscle use, shallow breath sounds without wheezes rales or rhonchi  Card:  No murmurs, tachycardic irregular S1, S2 without thrills, bruits or peripheral edema  Abd:  Soft, non-tender, non-distended, normoactive bowel sounds are present, no mass  Lymph:  No cervical or inguinal adenopathy  Musc:  No cyanosis or clubbing  Skin:  No rashes or ulcers, skin turgor is good  Neuro:  Cranial nerves are grossly intact, general motor weakness, follows commands   Psych:   Moderate insight, oriented to person, place and time, alert    Telemetry reviewed:   AFIB  __________________________________________________________________  Medications Reviewed: (see below)  Medications:     Current Facility-Administered Medications   Medication Dose Route Frequency    dilTIAZem CD (CARDIZEM CD) capsule 300 mg  300 mg Oral DAILY    predniSONE (DELTASONE) tablet 20 mg  20 mg Oral BID WITH MEALS    senna-docusate (PERICOLACE) 8.6-50 mg per tablet 1 Tab  1 Tab Oral DAILY    ipratropium (ATROVENT) 0.02 % nebulizer solution 0.5 mg  0.5 mg Nebulization Q6H RT    levalbuterol (XOPENEX) nebulizer soln 1.25 mg/3 mL  1.25 mg Nebulization Q6H RT    levalbuterol (XOPENEX) nebulizer soln 1.25 mg/3 mL  1.25 mg Nebulization Q2H PRN    guaiFENesin ER (MUCINEX) tablet 600 mg  600 mg Oral Q12H    0.9% sodium chloride infusion 250 mL  250 mL IntraVENous PRN    solifenacin (VESICARE) tablet 5 mg  5 mg Oral DAILY    polyethylene glycol (MIRALAX) packet 17 g  17 g Oral DAILY    lidocaine (XYLOCAINE) 2 % jelly   Mucous Membrane PRN    budesonide (PULMICORT) 500 mcg/2 ml nebulizer suspension  500 mcg Nebulization BID RT    arformoterol (BROVANA) neb solution 15 mcg  15 mcg Nebulization BID RT    sodium chloride (NS) flush 5-10 mL  5-10 mL IntraVENous Q8H    sodium chloride (NS) flush 5-10 mL  5-10 mL IntraVENous PRN    acetaminophen (TYLENOL) tablet 650 mg  650 mg Oral Q4H PRN    ondansetron (ZOFRAN) injection 4 mg  4 mg IntraVENous Q4H PRN    busPIRone (BUSPAR) tablet 5 mg  5 mg Oral BID    folic acid (FOLVITE) tablet 1 mg  1 mg Oral DAILY WITH DINNER    montelukast (SINGULAIR) tablet 10 mg  10 mg Oral DAILY WITH DINNER    pantoprazole (PROTONIX) tablet 40 mg  40 mg Oral ACB    theophylline ER (FRANCISCO-24) capsule 200 mg  200 mg Oral BID        Lab Data Reviewed: (see below)  Lab Review:     Recent Labs      11/07/17 0456  11/06/17   0506   WBC  18.3*  17.1*   HGB  8.8*  8.2*   HCT  26.1*  25.1*   PLT  246  219     Recent Labs      11/07/17   0456  11/06/17   0506   NA  127*  127*   K  4.7  4.5   CL  92*  92*   CO2  29  28   GLU  122*  134*   BUN  24*  19   CREA  0.77  0.80   CA  8.7  8.5   MG   --   1.8     No results found for: GLUCPOC  No results for input(s): PH, PCO2, PO2, HCO3, FIO2 in the last 72 hours.   No results for input(s): INR in the last 72 hours. No lab exists for component: INREXT, INREXT  All Micro Results     None          I have reviewed notes of prior 24hr.     Other pertinent lab: none    Total time spent with patient: Wu Ngo discussed with: Patient, Care Manager, Nursing Staff, Consultant/Specialist and >50% of time spent in counseling and coordination of care    Discussed:  Care Plan and D/C Planning    Prophylaxis:  H2B/PPI    Disposition:  SNF/LTC           ___________________________________________________    Attending Physician: Alpa Gordon MD

## 2017-11-08 NOTE — PROGRESS NOTES
Attempted to work with the patient for Physical Therapy, chart reviewed and discussed with nurse patient just finished with breathing treatment and offered to do therapy and be OOB to chair however patient refuse to do exercise and declined to be out of bed to chair. explained benefits of therapy patient continued to refuse. Notified nurse who agreed to monitor patient. We will continue to follow up with the patient for therapy. Thank you.

## 2017-11-08 NOTE — PROGRESS NOTES
Name: Frank Nan: [facility]   : 1935 Admit Date: 10/31/2017   Phone: [phone]  Room: [facility]/   PCP: Emi Narayanan MD  MRN: [id number]   Date: 2017  Code: DNR          Chart and notes reviewed. Data reviewed. I review the patient's current medications in the medical record at each encounter. I have evaluated and examined the patient. Spoke with nursing. Overnight events  Afebrile  HR 90s-110s; on po cardizem  Sats 97% on 3L  No new am labs  Refused PT earlier, but now willing to participate and has changed his mid about dispo - now willing to go to rehab  Urine clear and Hgb stabilized      ROS: Feeling a little SOB after working with RT and getting breathing treatment. Denies fever or chills. Denies sputum production. Has only been out of bed for a short time to [facility] with assistance.       Current Facility-Administered Medications   Medication Dose Route Frequency    dilTIAZem CD (CARDIZEM CD) capsule 300 mg  300 mg Oral DAILY    predniSONE (DELTASONE) tablet 20 mg  20 mg Oral BID WITH MEALS    senna-docusate (PERICOLACE) 8.6-50 mg per tablet 1 Tab  1 Tab Oral DAILY    ipratropium (ATROVENT) 0.02 % nebulizer solution 0.5 mg  0.5 mg Nebulization Q6H RT    levalbuterol (XOPENEX) nebulizer soln 1.25 mg/3 mL  1.25 mg Nebulization Q6H RT    levalbuterol (XOPENEX) nebulizer soln 1.25 mg/3 mL  1.25 mg Nebulization Q2H PRN    guaiFENesin ER (MUCINEX) tablet 600 mg  600 mg Oral Q12H    0.9% sodium chloride infusion 250 mL  250 mL IntraVENous PRN    solifenacin (VESICARE) tablet 5 mg  5 mg Oral DAILY    polyethylene glycol (MIRALAX) packet 17 g  17 g Oral DAILY    lidocaine (XYLOCAINE) 2 % jelly   Mucous Membrane PRN    budesonide (PULMICORT) 500 mcg/2 ml nebulizer suspension  500 mcg Nebulization BID RT    arformoterol (BROVANA) neb solution 15 mcg  15 mcg Nebulization BID RT    sodium chloride (NS) flush 5-10 mL  5-10 mL IntraVENous Q8H    sodium chloride (NS) flush 5-10 mL  5-10 mL IntraVENous PRN    acetaminophen (TYLENOL) tablet 650 mg  650 mg Oral Q4H PRN    ondansetron (ZOFRAN) injection 4 mg  4 mg IntraVENous Q4H PRN    busPIRone (BUSPAR) tablet 5 mg  5 mg Oral BID    folic acid (FOLVITE) tablet 1 mg  1 mg Oral DAILY WITH DINNER    montelukast (SINGULAIR) tablet 10 mg  10 mg Oral DAILY WITH DINNER    pantoprazole (PROTONIX) tablet 40 mg  40 mg Oral ACB    theophylline ER (FRANCISCO-24) capsule 200 mg  200 mg Oral BID         REVIEW OF SYSTEMS   12 point ROS negative except as stated in the HPI. Physical Exam:   Visit Vitals    /67 (BP 1 Location: Right arm, BP Patient Position: At rest)    Pulse (!) 115    Temp 97.2 °F (36.2 °C)    Resp 16    Ht 5' 10\" (1.778 m)    Wt 68.6 kg (151 lb 3.8 oz)    SpO2 97%    BMI 21.7 kg/m2       General:  Alert, cooperative, appears stated age. Head:  Normocephalic, without obvious abnormality, atraumatic. Eyes:  Conjunctivae/corneas clear. Nose: Nares normal. Septum midline. Mucosa normal.    Throat: Lips, mucosa, and tongue normal.    Neck: Supple, symmetrical, trachea midline, no adenopathy. Lungs:   Diminished bilaterally, scattered rhonchi, no wheeze   Chest wall:  No tenderness or deformity. Heart:  Irregular, mild tachy, no murmur, click, rub or gallop. Abdomen:   Soft, non-tender. Bowel sounds normal.  : 3 - way jha, clear urine   Extremities: Extremities normal, atraumatic, no cyanosis or edema. Pulses: 2+ and symmetric all extremities.    Skin: Scattered ecchymosis, decreased skin turgor normal.    Lymph nodes: Cervical, supraclavicular nodes normal.   Neurologic: Symmetric motor exam, has jerking of legs, almost like myoclonus       Lab Results   Component Value Date/Time    Sodium 127 11/07/2017 04:56 AM    Potassium 4.7 11/07/2017 04:56 AM    Chloride 92 11/07/2017 04:56 AM    CO2 29 11/07/2017 04:56 AM    BUN 24 11/07/2017 04:56 AM    Creatinine 0.77 11/07/2017 04:56 AM    Glucose 122 11/07/2017 04:56 AM    Calcium 8.7 11/07/2017 04:56 AM    Magnesium 1.8 11/06/2017 05:06 AM    Phosphorus 2.4 11/03/2017 05:41 AM    Lactic acid 1.7 10/31/2017 03:36 PM       Lab Results   Component Value Date/Time    WBC 18.3 11/07/2017 04:56 AM    HGB 8.8 11/07/2017 04:56 AM    PLATELET 091 00/94/2071 04:56 AM    MCV 87.3 11/07/2017 04:56 AM       Lab Results   Component Value Date/Time    INR 1.1 11/01/2017 04:25 AM    aPTT 29.5 10/31/2017 03:45 PM    AST (SGOT) 56 11/02/2017 05:43 AM    Alk.  phosphatase 55 11/02/2017 05:43 AM    Protein, total 5.2 11/02/2017 05:43 AM    Albumin 2.8 11/02/2017 05:43 AM    Globulin 2.4 11/02/2017 05:43 AM       Lab Results   Component Value Date/Time    Iron 33 11/01/2017 02:26 PM    TIBC 219 11/01/2017 02:26 PM    Iron % saturation 15 11/01/2017 02:26 PM    Ferritin 585 11/01/2017 02:26 PM       Lab Results   Component Value Date/Time    TSH 0.74 11/01/2017 04:25 AM        No results found for: PH, PHI, PCO2, PCO2I, PO2, PO2I, HCO3, HCO3I, FIO2, FIO2I    Lab Results   Component Value Date/Time     10/31/2017 03:36 PM    CK-MB Index 2.7 10/31/2017 03:36 PM    Troponin-I, Qt. <0.04 11/01/2017 12:49 PM        No results found for: CULT    No results found for: TOXA1, RPR, HBCM, HBSAG, HAAB, HCAB1, HAAT, G6PD, CRYAC, HIVGT, HIVR, HIV1, HIV12, HIVPC, HIVRPI    Lab Results   Component Value Date/Time     10/31/2017 03:36 PM       No results found for: COLOR, APPRN, SPGRU, BRANT, PROTU, GLUCU, KETU, BILU, BLDU, UROU, NAVEED, LEUKU, WBCU, RBCU, UEPI, BACTU, CASTS, UCRY      Images: no new images this morning      IMPRESSION  · Hematuria with ? small bladder mass vs clot  · Atrial fibrillation with RVR  · Chronic hypoxia  · COPD with potential exacerbation  · Hyponatremia improved  · Thrombocytopenia - was on mtx for years, RA - held  · Abnormal chest CT: mediastinal mass and RUL nodule; stable since 2012  · RA  · HTN  · Hx of prostate cancer: currently treated with Lupron    PLAN  · Hematuria improving/stable. Urology following and plans to reevaluate outpatient. · Continue scheduled Brovana/Pulmicort nebs; resume Symbicort at discharge. · Completed Rocephin (day 8) yesterday  · Mucinex  · Continue prednisone taper: weaned to 20mg BID; baseline is 10mg daily for COPD/RA  · Home Theophylline  · Rate control with Dilt PO. Cardiology has signed off. · PT/OT/OOB; discussed the importance of participation and patient is now agreeable. · Discussed with CM that patient is now agreeable to referral to rehab. · Encourage IS use  · GI Prophylaxis:  Protonix  · DVT Prophylaxis:  SCDs    Discussed with nursing, wife at the bedside, and CM. Patient is stable from a pulmonary standpoint. We will sign off and arrange for outpatient follow up in 2 weeks. Please call with questions.     Mechelle Aldridge

## 2017-11-08 NOTE — DISCHARGE INSTRUCTIONS
Patient Discharge Instructions    Suraj Jett / [id number] : 1935    Admitted 10/31/2017 Discharged: 2017     Primary Diagnoses  Problem List as of 2017  Date Reviewed: 10/31/2017          Codes Class Noted - Resolved   Paroxysmal atrial fibrillation (HCC)   * (Principal)Gross hematuria   COPD exacerbation ([facility]   Prostate cancer ([facility]   Mediastinal mass   Pulmonary nodule   HTN (hypertension)   Thrombocytopenia ([facility]          Take Home Medications     · It is important that you take the medication exactly as they are prescribed. · Keep your medication in the bottles provided by the pharmacist and keep a list of the medication names, dosages, and times to be taken in your wallet. · Do not take other medications without consulting your doctor. What to do at Home    Recommended diet: Cardiac Diet and Low fat, Low cholesterol    Recommended activity: Activity as tolerated and PT/OT Eval and Treat    If you experience worse bleeding, please follow up with urology. Follow-up with your PCP in a few weeks        Information obtained by :  I understand that if any problems occur once I am at home I am to contact my physician. I understand and acknowledge receipt of the instructions indicated above.                                                                                                                                            Physician's or R.N.'s Signature                                                                  Date/Time                                                                                                                                              Patient or Representative Signature                                                          Date/Time  Nutrition Recommendations for Discharge:    Continue Oral Nutrition Supplements at discharge:   Ensure Enlive or Ensure Plus 1-2 days daily between meals   unless otherwise directed by your Primary Care Physician. This product can be purchased at your local grocery store or drug store and online.      Saira Mathis RD   _

## 2017-11-08 NOTE — DISCHARGE SUMMARY
Physician Discharge Summary     Patient ID:  Hernando Collins  [id number]  30 y.o.  1935    Admit date: 10/31/2017    Discharge date and time: 11/8/2017    Admission Diagnoses: Gross hematuria    Discharge Diagnoses:    Principal Diagnosis   Gross hematuria                                             Other Diagnoses    COPD exacerbation ([facility] (10/31/2017)    Prostate cancer ([facility] ()    Mediastinal mass ()    Pulmonary nodule ()    HTN (hypertension) ()    Thrombocytopenia ([facility] (10/31/2017)    Paroxysmal atrial fibrillation ([facility] (11/1/2017)    Hospital Course:   Gross hematuria - Resolved after CBI. Has chronic indwelling catheter.  Also hx of prostate CA.  CT concerning for papillary mass of R ureter. Urology following, may need cystoscopy, but I await their final statement and sign off. Continue vesicare      Complicated UTI related to an indwelling catheter - Presumed. Treated empirically for 7 days. Usually on TIW Bactrim. Urology should decide if that continues      Multiple spine compression fracture - Noted as incidental findings on CT of T2, T9, L2.  Chronically debilitated at home.  Continue pain control. Consult PT/OT, will need SNF, and he agrees.     Paroxysmal atrial fibrillation with RVR - New onset, no prior hx of afib.  ECHO with EF 55-60%.  Rate elevated intermittently on dilt gtt, so we increased po dilt. Cardiology following, and will do outpatient follow up. No anticoagulation was given, due to bleeding.      Acute blood loss anemia - POA due to hematuria and chronic disease.  Now stable. Serologies normal.      COPD exacerbation - POA, resolved. Likely anemia exacerbated this. Finished PO prednisone taper, continue theophyline, singulair, pulmicort/Brova and guaifenesin  Prn duonebs.     Chronic resp failure with hypoxia - Back to his baseline chronic 2.5L O2.  Will cont duo nebs (changed to xopenex), improved while on IV steroids and now on PO.  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  Defer to pulm      Thrombocytopenia / ecchymosis - Could be from MTX, underlying cancer, acute illness. Holding MTX, NSAIDS.  Hematology following. Now resolving      Leukocytosis - Stress response, steroids.  Completed antibiotcis      HTN - Now on diltiazem. No BB due to lung dz.      RA - Holding MTX/bactrim      PCP: Ravi Ingram MD    Consults: Cardiology, Pulmonary/Intensive care, Rehabilitation Medicine and Urology    Significant Diagnostic Studies: See Hospital Course    Discharged home in improved condition. Discharge Exam:   /67 (BP 1 Location: Right arm, BP Patient Position: At rest)    Pulse 97    Temp 97.2 °F (36.2 °C)    Resp 16    Ht 5' 10\" (1.778 m)    Wt 68.6 kg (151 lb 3.8 oz)    SpO2 97%    BMI 21.7 kg/m2      Gen:  Frail, in no acute distress  HEENT:  Pink conjunctivae, PERRL, hearing intact to voice, moist mucous membranes  Neck:  Supple, without masses, thyroid non-tender  Resp:  No accessory muscle use, shallow breath sounds without wheezes rales or rhonchi  Card:  No murmurs, tachycardic irregular S1, S2 without thrills, bruits or peripheral edema  Abd:  Soft, non-tender, non-distended, normoactive bowel sounds are present, no mass  Lymph:  No cervical or inguinal adenopathy  Musc:  No cyanosis or clubbing  Skin:  No rashes or ulcers, skin turgor is good  Neuro:  Cranial nerves are grossly intact, general motor weakness, follows commands   Psych: Moderate insight, oriented to person, place and time, alert    Patient Instructions:   Current Discharge Medication List      START taking these medications    Details   dilTIAZem CD (CARDIZEM CD) 300 mg ER capsule Take 1 Cap by mouth daily for 30 days.   Qty: 30 Cap, Refills: 0      senna-docusate (PERICOLACE) 8.6-50 mg per tablet Take 1 Tab by mouth daily for 30 days.  Qty: 30 Tab, Refills: 0      !! predniSONE (DELTASONE) 20 mg tablet Take 1 Tab by mouth two (2) times daily (with meals) for 3 days. Qty: 6 Tab, Refills: 0       !! - Potential duplicate medications found. Please discuss with provider. CONTINUE these medications which have NOT CHANGED    Details   albuterol-ipratropium (DUO-NEB) 2.5 mg-0.5 mg/3 ml nebu 3 mL by Nebulization route every four (4) hours as needed. budesonide-formoterol (SYMBICORT) 160-4.5 mcg/actuation HFAA Take 2 Puffs by inhalation two (2) times a day. BUSPIRONE HCL (BUSPAR PO) Take 5 mg by mouth two (2) times a day. celecoxib (CELEBREX) 200 mg capsule Take 200 mg by mouth daily. folic acid (FOLVITE) 1 mg tablet Take 1 mg by mouth daily (with dinner). montelukast (SINGULAIR) 10 mg tablet Take 10 mg by mouth daily (with dinner). pantoprazole (PROTONIX) 40 mg tablet Take 40 mg by mouth Daily (before breakfast). theophylline ER (FRANCISCO-24) 400 mg cp24 capsule Take 200 mg by mouth two (2) times a day. !! predniSONE (DELTASONE) 10 mg tablet Take 10 mg by mouth daily (with breakfast). methotrexate (RHEUMATREX) 2.5 mg tablet Take 15 mg by mouth every Wednesday. !! - Potential duplicate medications found. Please discuss with provider. STOP taking these medications       ciprofloxacin HCl (CIPRO) 500 mg tablet Comments:   Reason for Stopping:         furosemide (LASIX) 20 mg tablet Comments:   Reason for Stopping:         metoprolol succinate (TOPROL XL) 25 mg XL tablet Comments:   Reason for Stopping:         trimethoprim-sulfamethoxazole (BACTRIM DS) 160-800 mg per tablet Comments:   Reason for Stopping:         naproxen sodium (ALEVE) 220 mg tablet Comments:   Reason for Stopping:             Activity: Activity as tolerated and PT/OT Eval and Treat  Diet: Cardiac Diet and Low fat, Low cholesterol  Wound Care: None needed    Follow-up with your PCP and urology in a few weeks.   Follow-up tests/labs - none    Signed:  Pina Narayan MD  11/8/2017  9:09 AM

## 2021-07-26 NOTE — PROGRESS NOTES
Name: Ting De Jesus: [facility]   : 1935 Admit Date: 10/31/2017   Phone: [phone]  Room: [facility]– [place][note 2[place]   PCP: Natalie Bauer MD  MRN: [id number]   Date: 2017  Code: DNR          Chart and notes reviewed. Data reviewed. I review the patient's current medications in the medical record at each encounter. I have evaluated and examined the patient. Spoke with nursing. Hematuria improving: off of CBI  Dilt gtt off now: -120s on PO  O2 sats 94% on 2L:  BP stable  WBC 18.3  Hgb 8.8: stable  Na 127: same        ROS: Feeling slowly better daily. Feels constipated. SOB somewhat better. Denies fever or chills. Denies sputum production. Has only been out of bed for a short time.       Current Facility-Administered Medications   Medication Dose Route Frequency    dilTIAZem CD (CARDIZEM CD) capsule 300 mg  300 mg Oral DAILY    predniSONE (DELTASONE) tablet 30 mg  30 mg Oral BID WITH MEALS    ipratropium (ATROVENT) 0.02 % nebulizer solution 0.5 mg  0.5 mg Nebulization Q6H RT    levalbuterol (XOPENEX) nebulizer soln 1.25 mg/3 mL  1.25 mg Nebulization Q6H RT    levalbuterol (XOPENEX) nebulizer soln 1.25 mg/3 mL  1.25 mg Nebulization Q2H PRN    guaiFENesin ER (MUCINEX) tablet 600 mg  600 mg Oral Q12H    0.9% sodium chloride infusion 250 mL  250 mL IntraVENous PRN    dilTIAZem (CARDIZEM) 125 mg in dextrose 5% 125 mL infusion  0-15 mg/hr IntraVENous TITRATE    solifenacin (VESICARE) tablet 5 mg  5 mg Oral DAILY    polyethylene glycol (MIRALAX) packet 17 g  17 g Oral DAILY    lidocaine (XYLOCAINE) 2 % jelly   Mucous Membrane PRN    budesonide (PULMICORT) 500 mcg/2 ml nebulizer suspension  500 mcg Nebulization BID RT    arformoterol (BROVANA) neb solution 15 mcg  15 mcg Nebulization BID RT    sodium chloride (NS) flush 5-10 mL  5-10 mL IntraVENous Q8H    sodium chloride (NS) flush 5-10 mL  5-10 mL IntraVENous PRN    acetaminophen (TYLENOL) tablet 650 mg  650 mg Oral Q4H PRN    ondansetron (ZOFRAN) injection 4 mg  4 mg IntraVENous Q4H PRN    cefTRIAXone (ROCEPHIN) 1 g in 0.9% sodium chloride (MBP/ADV) 50 mL  1 g IntraVENous Q24H    busPIRone (BUSPAR) tablet 5 mg  5 mg Oral BID    folic acid (FOLVITE) tablet 1 mg  1 mg Oral DAILY WITH DINNER    montelukast (SINGULAIR) tablet 10 mg  10 mg Oral DAILY WITH DINNER    pantoprazole (PROTONIX) tablet 40 mg  40 mg Oral ACB    theophylline ER (FRANCISCO-24) capsule 200 mg  200 mg Oral BID         REVIEW OF SYSTEMS   12 point ROS negative except as stated in the HPI. Physical Exam:   Visit Vitals    /59 (BP 1 Location: Right arm, BP Patient Position: At rest)    Pulse 92    Temp 98.1 °F (36.7 °C)    Resp 18    Ht 5' 10\" (1.778 m)    Wt 68.6 kg (151 lb 3.8 oz)    SpO2 94%    BMI 21.7 kg/m2       General:  Alert, cooperative, sob appears stated age. Head:  Normocephalic, without obvious abnormality, atraumatic. Eyes:  Conjunctivae/corneas clear. Nose: Nares normal. Septum midline. Mucosa normal.    Throat: Lips, mucosa, and tongue normal.    Neck: Supple, symmetrical, trachea midline, no adenopathy. Lungs:   Diminished bilaterally, scattered rhonchi, no wheeze   Chest wall:  No tenderness or deformity. Heart:  Irregular, rate 03R no murmur, click, rub or gallop. Abdomen:   Soft, non-tender. Bowel sounds normal.  : 3 - way jha, pink urine   Extremities: Extremities normal, atraumatic, no cyanosis or edema. Pulses: 2+ and symmetric all extremities.    Skin: Scattered ecchymosis, decreased skin turgor normal.    Lymph nodes: Cervical, supraclavicular nodes normal.   Neurologic: Symmetric motor exam, has jerking of legs, almost like myoclonus       Lab Results   Component Value Date/Time    Sodium 127 11/07/2017 04:56 AM    Potassium 4.7 11/07/2017 04:56 AM    Chloride 92 11/07/2017 04:56 AM    CO2 29 11/07/2017 04:56 AM    BUN 24 11/07/2017 04:56 AM    Creatinine 0.77 11/07/2017 04:56 AM    Glucose 122 11/07/2017 04:56 AM    Calcium 8.7 11/07/2017 04:56 AM    Magnesium 1.8 11/06/2017 05:06 AM    Phosphorus 2.4 11/03/2017 05:41 AM    Lactic acid 1.7 10/31/2017 03:36 PM       Lab Results   Component Value Date/Time    WBC 18.3 11/07/2017 04:56 AM    HGB 8.8 11/07/2017 04:56 AM    PLATELET 596 30/77/2149 04:56 AM    MCV 87.3 11/07/2017 04:56 AM       Lab Results   Component Value Date/Time    INR 1.1 11/01/2017 04:25 AM    aPTT 29.5 10/31/2017 03:45 PM    AST (SGOT) 56 11/02/2017 05:43 AM    Alk.  phosphatase 55 11/02/2017 05:43 AM    Protein, total 5.2 11/02/2017 05:43 AM    Albumin 2.8 11/02/2017 05:43 AM    Globulin 2.4 11/02/2017 05:43 AM       Lab Results   Component Value Date/Time    Iron 33 11/01/2017 02:26 PM    TIBC 219 11/01/2017 02:26 PM    Iron % saturation 15 11/01/2017 02:26 PM    Ferritin 585 11/01/2017 02:26 PM       Lab Results   Component Value Date/Time    TSH 0.74 11/01/2017 04:25 AM        No results found for: PH, PHI, PCO2, PCO2I, PO2, PO2I, HCO3, HCO3I, FIO2, FIO2I    Lab Results   Component Value Date/Time     10/31/2017 03:36 PM    CK-MB Index 2.7 10/31/2017 03:36 PM    Troponin-I, Qt. <0.04 11/01/2017 12:49 PM        No results found for: CULT    No results found for: TOXA1, RPR, HBCM, HBSAG, HAAB, HCAB1, HAAT, G6PD, CRYAC, HIVGT, HIVR, HIV1, HIV12, HIVPC, HIVRPI    Lab Results   Component Value Date/Time     10/31/2017 03:36 PM       No results found for: COLOR, APPRN, SPGRU, BRANT, PROTU, GLUCU, KETU, BILU, BLDU, UROU, NAVEED, LEUKU, WBCU, RBCU, UEPI, BACTU, CASTS, UCRY      Images: no new images this morning      IMPRESSION  · Hematuria with ? small bladder mass vs clot  · Atrial fibrillation with RVR  · Chronic hypoxia  · COPD with potential exacerbation  · Hyponatremia improved  · Thrombocytopenia - was on mtx for years, RA - held  · Abnormal chest CT: mediastinal mass and RUL nodule; stable since 2012  · RA  · HTN  · Hx of prostate cancer: currently treated with Lupron    PLAN  · Hematuria improving: continue to watch closely. Urology following. · Continue scheduled Brovana/Pulmicort nebs  · On Rocephin (day 8): stop today  · Mucinex  · Continue prednisone taper: wean to 20mg BID  · Replete Mag  · Home Theophylline  · Rate control with Dilt PO. Cardiology following. · PT/OT/OOB today  · Encourage IS use  · GI Prophylaxis:  Protonix  · DVT Prophylaxis:  SCDs    Discussed with nursing.       Ila Estes NP Declines

## 2022-07-11 NOTE — PROGRESS NOTES
Cardiology Progress Note         NAME:  Carmelita Graham   :   1935   MRN:   [id number]     Assessment/Plan:   PAF triggered by illness      - cont rate control  - cardizem gtt off this am- start po SR and change to LA prior to DC      - stop  low dose BB      - no anticoagulation 2/2 hematuria   Hematuria- per urology   HTN w/ variable BP  End stage COPD - home O2/steroids  Anemia 2/2 above- per attending   Hyponatremia- per attending  DNR      we will sign off, please call again if needed        Pt personally seen and examined. Chart reviewed. Agree with advanced NP's history, exam and  A/P with changes/additons. Pt confused  Neck-no JVD  CVS-S1-S2 present, no murmur    2/6 systolic murmur present  RS-   CTAB     bilateral rhonchi present  Abdomen-  soft/N         Discussed with patient/nursing    Nava Charlton MD, [facility] [place]      Subjective:   Carmelita Graham is a 80y.o. year old male w/ hx: HTN , COPD (2.5L NC/steroids/theophylline) cx by PAF (during acute illness a few yrs ago,  no anticoagulation), Prostate Ca on Lupron , RA,  Anemia,  who presented on 10/31 for evaluation of hematuria x 3 days. The patient has a chronic indwelling jha for urinary retention. He denies any traumatic insertion but endorses switching catheter out prior to arrival to hospital.  The patient was placed on a continuous bladder irrigation and is being followed by urology. On admission patient was also noted to be HYPOnatermic    Overnight, the patient was noted to have intermittent A flutter with RVR. Chronic class IV dyspnea He denies any CP, edema, dizziness. Around 0500 patient was started on diltiazem gtt at 5 mg/hr but episodes of rapid a flutter persisted. BP as low as 96/67 during episodes, limiting up-titration of diltiazem.     Per patient, he has had no \"heart problems\" in the past.  Denies CAD, afib, Endorses SOB prior to admission but feels he is at baseline. He has never had an ischemic workup in the past.        Overnight activities reviewed:    - -140/ range    - Tele SR PACs, PAF    - cardizem gtt off this am    - K+ 4    Mg++1.8,      Cr 0.96    - Hgb 8.1 plt better 136     - jha changed this am  Cont bladder irrig    - IVF 75 cc/hr taking po     Cardiac ROS: chronic class IV dyspnea- breathing at baseline  Patient denies any exertional chest pain,  , palpitations, syncope, orthopnea, edema or paroxysmal nocturnal dyspnea. Review of Systems: hematuria remains, pain  w/ jha change No nausea, indigestion, vomiting, pain, cough, sputum. Taking po. Appetite OK        CARDIAC EVALUATION   ECHO 2017: EF 55-60%, no WMA, G1DD    Objective:     Visit Vitals    BP (!) 119/105    Pulse 95    Temp 98 °F (36.7 °C)    Resp 21    Ht 5' 10\" (1.778 m)    Wt 150 lb 12.7 oz (68.4 kg)    SpO2 96%    BMI 21.64 kg/m2      O2 Flow Rate (L/min): 3 l/min O2 Device: Nasal cannula    Temp (24hrs), Av.3 °F (36.8 °C), Min:98 °F (36.7 °C), Max:98.5 °F (36.9 °C)        Intake/Output Summary (Last 24 hours) at 17 0915  Last data filed at 17 0654   Gross per 24 hour   Intake          8361.25 ml   Output             7300 ml   Net          1061.25 ml       TELE: SR PACs PAF     General: AAOx3 cooperative, frail, chronically ill   HEENT: Atraumatic. Pale  and moist.  Anicteric sclerae. Neck: Supple,     Lungs: barrel chest, decreased throughout, few wheezes,      Heart: PMI: diminished Irregular rhythm, no murmur, no S3, no rubs, no gallops. No JVD. No carotid bruits. Abdomen: Soft, non-distended, non-tender. + Bowel sounds. No bruits. : jha hematuria CBI   Extremities: skin friable, multiple ecchymotic areas on arms, No edema, no clubbing, no cyanosis. No calf tenderness  Neurologic: tremulous, Grossly intact. Alert and oriented X 3. No acute neurological distress. Psych: Good insight. Not anxious nor agitated.       Care Plan discussed with:    Comments   Patient y    Family  y Wife and RICHARD Dr Kitty Polo yesterday    RN y    Care Manager                    Consultant:          Data Review:   CMP:   Lab Results   Component Value Date/Time     (L) 11/02/2017 05:43 AM    K 4.0 11/02/2017 05:43 AM    CL 95 (L) 11/02/2017 05:43 AM    CO2 27 11/02/2017 05:43 AM    AGAP 8 11/02/2017 05:43 AM     (H) 11/02/2017 05:43 AM    BUN 21 (H) 11/02/2017 05:43 AM    CREA 0.96 11/02/2017 05:43 AM    GFRAA >60 11/02/2017 05:43 AM    GFRNA >60 11/02/2017 05:43 AM    CA 9.1 11/02/2017 05:43 AM    MG 1.8 11/02/2017 05:43 AM    PHOS 3.0 11/02/2017 05:43 AM    ALB 2.8 (L) 11/02/2017 05:43 AM    TBILI 0.3 11/02/2017 05:43 AM    TP 5.2 (L) 11/02/2017 05:43 AM    GLOB 2.4 11/02/2017 05:43 AM    AGRAT 1.2 11/02/2017 05:43 AM    SGOT 56 (H) 11/02/2017 05:43 AM    ALT 26 11/02/2017 05:43 AM     CBC:   Lab Results   Component Value Date/Time    WBC 20.5 (H) 11/02/2017 05:43 AM    HGB 8.1 (L) 11/02/2017 05:43 AM    HCT 23.5 (L) 11/02/2017 05:43 AM     (L) 11/02/2017 05:43 AM     All Cardiac Markers in the last 24 hours:   Lab Results   Component Value Date/Time    TROIQ <0.04 11/01/2017 12:49 PM     Recent Glucose Results:   Lab Results   Component Value Date/Time     (H) 11/02/2017 05:43 AM     ABG: No results found for: PH, PHI, PCO2, PCO2I, PO2, PO2I, HCO3, HCO3I, FIO2, FIO2I  LIPIDS:  No results found for: CHOL, HDL, LDLC, VLDL, CHHD    Medications reviewed  Current Facility-Administered Medications   Medication Dose Route Frequency    lidocaine (XYLOCAINE) 2 % jelly   Mucous Membrane PRN    predniSONE (DELTASONE) tablet 40 mg  40 mg Oral DAILY WITH BREAKFAST    dilTIAZem (CARDIZEM) 125 mg in dextrose 5% 125 mL infusion  0-15 mg/hr IntraVENous CONTINUOUS    albuterol-ipratropium (DUO-NEB) 2.5 MG-0.5 MG/3 ML  3 mL Nebulization Q6H RT    budesonide (PULMICORT) 500 mcg/2 ml nebulizer suspension  500 mcg Nebulization BID RT    arformoterol (BROVANA) neb solution 15 mcg  15 mcg Nebulization BID RT    sodium chloride (NS) flush 5-10 mL  5-10 mL IntraVENous Q8H    sodium chloride (NS) flush 5-10 mL  5-10 mL IntraVENous PRN    0.9% sodium chloride infusion  75 mL/hr IntraVENous CONTINUOUS    acetaminophen (TYLENOL) tablet 650 mg  650 mg Oral Q4H PRN    ondansetron (ZOFRAN) injection 4 mg  4 mg IntraVENous Q4H PRN    albuterol (PROVENTIL VENTOLIN) nebulizer solution 2.5 mg  2.5 mg Nebulization Q2H PRN    cefTRIAXone (ROCEPHIN) 1 g in 0.9% sodium chloride (MBP/ADV) 50 mL  1 g IntraVENous Q24H    busPIRone (BUSPAR) tablet 5 mg  5 mg Oral BID    folic acid (FOLVITE) tablet 1 mg  1 mg Oral DAILY WITH DINNER    metoprolol succinate (TOPROL-XL) XL tablet 25 mg  25 mg Oral BID    montelukast (SINGULAIR) tablet 10 mg  10 mg Oral DAILY WITH DINNER    pantoprazole (PROTONIX) tablet 40 mg  40 mg Oral ACB    theophylline ER (FRANCISCO-24) capsule 200 mg  200 mg Oral BID         Shayla Shotr RN, ACNP-BC, [facility] Detail Level: Detailed Size Of Lesion In Cm (Optional): 0

## 2022-09-12 NOTE — PROGRESS NOTES
Echo complete report to follow. Cartilage Graft Text: The defect edges were debeveled with a #15 scalpel blade.  Given the location of the defect, shape of the defect, the fact the defect involved a full thickness cartilage defect a cartilage graft was deemed most appropriate.  An appropriate donor site was identified, cleansed, and anesthetized. The cartilage graft was then harvested and transferred to the recipient site, oriented appropriately and then sutured into place.  The secondary defect was then repaired using a primary closure.
